# Patient Record
Sex: MALE | Race: WHITE | NOT HISPANIC OR LATINO | URBAN - METROPOLITAN AREA
[De-identification: names, ages, dates, MRNs, and addresses within clinical notes are randomized per-mention and may not be internally consistent; named-entity substitution may affect disease eponyms.]

---

## 2018-06-28 ENCOUNTER — EMERGENCY (EMERGENCY)
Facility: HOSPITAL | Age: 68
LOS: 0 days | Discharge: HOME | End: 2018-06-28
Admitting: PHYSICIAN ASSISTANT

## 2018-06-28 VITALS
HEART RATE: 96 BPM | TEMPERATURE: 97 F | RESPIRATION RATE: 18 BRPM | OXYGEN SATURATION: 98 % | SYSTOLIC BLOOD PRESSURE: 165 MMHG | DIASTOLIC BLOOD PRESSURE: 80 MMHG

## 2018-06-28 DIAGNOSIS — H60.91 UNSPECIFIED OTITIS EXTERNA, RIGHT EAR: ICD-10-CM

## 2018-06-28 DIAGNOSIS — H92.09 OTALGIA, UNSPECIFIED EAR: ICD-10-CM

## 2018-06-28 DIAGNOSIS — I10 ESSENTIAL (PRIMARY) HYPERTENSION: ICD-10-CM

## 2018-06-28 RX ORDER — CIPROFLOXACIN HCL 0.3 %
1 DROPS OPHTHALMIC (EYE) ONCE
Qty: 0 | Refills: 0 | Status: DISCONTINUED | OUTPATIENT
Start: 2018-06-28 | End: 2018-06-28

## 2018-06-28 RX ORDER — CIPROFLOXACIN 6 MG/ML
1 SUSPENSION INTRATYMPANIC
Qty: 1 | Refills: 0
Start: 2018-06-28 | End: 2018-07-04

## 2018-06-28 NOTE — ED PROVIDER NOTE - OBJECTIVE STATEMENT
67 y/o M, no significant PMHx, presents to the ED with complaints of right otalgia x one week. Patient denies associated fever, chills, nausea, vomiting and cephalgia. He states that symptoms began after showering; he placed q-tip to right ear thinking that there was fluid in ear without symptomatic improvement.

## 2018-06-28 NOTE — ED PROVIDER NOTE - ENMT, MLM
+ Right ear canal with swelling ; no drainage ; Left TM with good cone of light visualized. Airway patent, Nasal mucosa clear. Mouth with normal mucosa. Throat has no vesicles, no oropharyngeal exudates and uvula is midline.

## 2018-06-28 NOTE — ED PROVIDER NOTE - ENMT NEGATIVE STATEMENT, MLM
Ears: + right ear pain and no hearing problems.Nose: no nasal congestion and no nasal drainage.Mouth/Throat: no dysphagia, no hoarseness and no throat pain.Neck: no lumps, no pain, no stiffness and no swollen glands.

## 2018-06-28 NOTE — ED ADULT TRIAGE NOTE - CHIEF COMPLAINT QUOTE
Right ear pain x 1 week that has worsened over past few days, pt started feeling discomfort after using Q-tip last week, feeling clogged and fluid in ear sensation, left ear gets clogged too when laying down

## 2018-07-06 PROBLEM — Z00.00 ENCOUNTER FOR PREVENTIVE HEALTH EXAMINATION: Status: ACTIVE | Noted: 2018-07-06

## 2018-07-13 ENCOUNTER — APPOINTMENT (OUTPATIENT)
Dept: OTOLARYNGOLOGY | Facility: CLINIC | Age: 68
End: 2018-07-13
Payer: COMMERCIAL

## 2018-07-13 VITALS — WEIGHT: 162 LBS | HEIGHT: 68 IN | BODY MASS INDEX: 24.55 KG/M2

## 2018-07-13 DIAGNOSIS — Z80.0 FAMILY HISTORY OF MALIGNANT NEOPLASM OF DIGESTIVE ORGANS: ICD-10-CM

## 2018-07-13 DIAGNOSIS — I10 ESSENTIAL (PRIMARY) HYPERTENSION: ICD-10-CM

## 2018-07-13 DIAGNOSIS — Z78.9 OTHER SPECIFIED HEALTH STATUS: ICD-10-CM

## 2018-07-13 DIAGNOSIS — H90.3 SENSORINEURAL HEARING LOSS, BILATERAL: ICD-10-CM

## 2018-07-13 DIAGNOSIS — T78.40XA ALLERGY, UNSPECIFIED, INITIAL ENCOUNTER: ICD-10-CM

## 2018-07-13 DIAGNOSIS — H61.23 IMPACTED CERUMEN, BILATERAL: ICD-10-CM

## 2018-07-13 PROCEDURE — 92557 COMPREHENSIVE HEARING TEST: CPT

## 2018-07-13 PROCEDURE — 99204 OFFICE O/P NEW MOD 45 MIN: CPT | Mod: 25

## 2018-07-13 PROCEDURE — 92567 TYMPANOMETRY: CPT

## 2018-07-13 RX ORDER — LOSARTAN POTASSIUM 100 MG/1
TABLET, FILM COATED ORAL
Refills: 0 | Status: ACTIVE | COMMUNITY

## 2019-02-03 PROBLEM — T78.40XA ALLERGY: Status: ACTIVE | Noted: 2018-07-13

## 2023-01-01 ENCOUNTER — APPOINTMENT (OUTPATIENT)
Dept: ELECTROPHYSIOLOGY | Facility: CLINIC | Age: 73
End: 2023-01-01

## 2023-01-01 ENCOUNTER — INPATIENT (INPATIENT)
Facility: HOSPITAL | Age: 73
LOS: 21 days | Discharge: NURSING, REHAB OR LONG-TERM CARE | DRG: 40 | End: 2023-11-07
Attending: HOSPITALIST | Admitting: INTERNAL MEDICINE
Payer: COMMERCIAL

## 2023-01-01 ENCOUNTER — TRANSCRIPTION ENCOUNTER (OUTPATIENT)
Age: 73
End: 2023-01-01

## 2023-01-01 ENCOUNTER — APPOINTMENT (OUTPATIENT)
Dept: CARDIOLOGY | Facility: CLINIC | Age: 73
End: 2023-01-01
Payer: COMMERCIAL

## 2023-01-01 ENCOUNTER — NON-APPOINTMENT (OUTPATIENT)
Age: 73
End: 2023-01-01

## 2023-01-01 VITALS
RESPIRATION RATE: 16 BRPM | HEART RATE: 82 BPM | OXYGEN SATURATION: 98 % | TEMPERATURE: 99 F | SYSTOLIC BLOOD PRESSURE: 203 MMHG | DIASTOLIC BLOOD PRESSURE: 94 MMHG

## 2023-01-01 VITALS
SYSTOLIC BLOOD PRESSURE: 136 MMHG | DIASTOLIC BLOOD PRESSURE: 74 MMHG | RESPIRATION RATE: 18 BRPM | HEART RATE: 80 BPM | TEMPERATURE: 97 F

## 2023-01-01 DIAGNOSIS — R26.2 DIFFICULTY IN WALKING, NOT ELSEWHERE CLASSIFIED: ICD-10-CM

## 2023-01-01 DIAGNOSIS — R41.0 DISORIENTATION, UNSPECIFIED: ICD-10-CM

## 2023-01-01 DIAGNOSIS — Z78.1 PHYSICAL RESTRAINT STATUS: ICD-10-CM

## 2023-01-01 DIAGNOSIS — R47.81 SLURRED SPEECH: ICD-10-CM

## 2023-01-01 DIAGNOSIS — F01.C11: ICD-10-CM

## 2023-01-01 DIAGNOSIS — R47.1 DYSARTHRIA AND ANARTHRIA: ICD-10-CM

## 2023-01-01 DIAGNOSIS — R63.0 ANOREXIA: ICD-10-CM

## 2023-01-01 DIAGNOSIS — B95.2 ENTEROCOCCUS AS THE CAUSE OF DISEASES CLASSIFIED ELSEWHERE: ICD-10-CM

## 2023-01-01 DIAGNOSIS — Z66 DO NOT RESUSCITATE: ICD-10-CM

## 2023-01-01 DIAGNOSIS — R74.8 ABNORMAL LEVELS OF OTHER SERUM ENZYMES: ICD-10-CM

## 2023-01-01 DIAGNOSIS — F03.90 UNSPECIFIED DEMENTIA WITHOUT BEHAVIORAL DISTURBANCE: ICD-10-CM

## 2023-01-01 DIAGNOSIS — I10 ESSENTIAL (PRIMARY) HYPERTENSION: ICD-10-CM

## 2023-01-01 DIAGNOSIS — Z51.5 ENCOUNTER FOR PALLIATIVE CARE: ICD-10-CM

## 2023-01-01 DIAGNOSIS — R33.9 RETENTION OF URINE, UNSPECIFIED: ICD-10-CM

## 2023-01-01 DIAGNOSIS — N21.0 CALCULUS IN BLADDER: ICD-10-CM

## 2023-01-01 DIAGNOSIS — R13.10 DYSPHAGIA, UNSPECIFIED: ICD-10-CM

## 2023-01-01 DIAGNOSIS — R53.1 WEAKNESS: ICD-10-CM

## 2023-01-01 DIAGNOSIS — I65.01 OCCLUSION AND STENOSIS OF RIGHT VERTEBRAL ARTERY: ICD-10-CM

## 2023-01-01 DIAGNOSIS — N39.0 URINARY TRACT INFECTION, SITE NOT SPECIFIED: ICD-10-CM

## 2023-01-01 DIAGNOSIS — G93.41 METABOLIC ENCEPHALOPATHY: ICD-10-CM

## 2023-01-01 DIAGNOSIS — Z71.89 OTHER SPECIFIED COUNSELING: ICD-10-CM

## 2023-01-01 DIAGNOSIS — E87.6 HYPOKALEMIA: ICD-10-CM

## 2023-01-01 DIAGNOSIS — N17.9 ACUTE KIDNEY FAILURE, UNSPECIFIED: ICD-10-CM

## 2023-01-01 DIAGNOSIS — T83.098A OTHER MECHANICAL COMPLICATION OF OTHER URINARY CATHETER, INITIAL ENCOUNTER: ICD-10-CM

## 2023-01-01 DIAGNOSIS — E43 UNSPECIFIED SEVERE PROTEIN-CALORIE MALNUTRITION: ICD-10-CM

## 2023-01-01 DIAGNOSIS — J69.0 PNEUMONITIS DUE TO INHALATION OF FOOD AND VOMIT: ICD-10-CM
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
## 2023-01-01 DIAGNOSIS — I47.10 SUPRAVENTRICULAR TACHYCARDIA, UNSPECIFIED: ICD-10-CM

## 2023-01-01 DIAGNOSIS — R31.0 GROSS HEMATURIA: ICD-10-CM

## 2023-01-01 DIAGNOSIS — R73.9 HYPERGLYCEMIA, UNSPECIFIED: ICD-10-CM

## 2023-01-01 DIAGNOSIS — Z91.148 PATIENT'S OTHER NONCOMPLIANCE WITH MEDICATION REGIMEN FOR OTHER REASON: ICD-10-CM

## 2023-01-01 DIAGNOSIS — I63.40 CEREBRAL INFARCTION DUE TO EMBOLISM OF UNSPECIFIED CEREBRAL ARTERY: ICD-10-CM

## 2023-01-01 DIAGNOSIS — N40.1 BENIGN PROSTATIC HYPERPLASIA WITH LOWER URINARY TRACT SYMPTOMS: ICD-10-CM

## 2023-01-01 DIAGNOSIS — D64.9 ANEMIA, UNSPECIFIED: ICD-10-CM

## 2023-01-01 DIAGNOSIS — Z20.822 CONTACT WITH AND (SUSPECTED) EXPOSURE TO COVID-19: ICD-10-CM

## 2023-01-01 LAB
-  AMPICILLIN: SIGNIFICANT CHANGE UP
-  AMPICILLIN: SIGNIFICANT CHANGE UP
-  CIPROFLOXACIN: SIGNIFICANT CHANGE UP
-  CIPROFLOXACIN: SIGNIFICANT CHANGE UP
-  LEVOFLOXACIN: SIGNIFICANT CHANGE UP
-  LEVOFLOXACIN: SIGNIFICANT CHANGE UP
-  NITROFURANTOIN: SIGNIFICANT CHANGE UP
-  NITROFURANTOIN: SIGNIFICANT CHANGE UP
-  TETRACYCLINE: SIGNIFICANT CHANGE UP
-  TETRACYCLINE: SIGNIFICANT CHANGE UP
-  VANCOMYCIN: SIGNIFICANT CHANGE UP
-  VANCOMYCIN: SIGNIFICANT CHANGE UP
A1C WITH ESTIMATED AVERAGE GLUCOSE RESULT: 5.8 % — HIGH (ref 4–5.6)
A1C WITH ESTIMATED AVERAGE GLUCOSE RESULT: 5.8 % — HIGH (ref 4–5.6)
ACRM BINDING ANTIBODY: 0.09 NMOL/L — SIGNIFICANT CHANGE UP (ref 0–0.24)
ACRM BINDING ANTIBODY: 0.09 NMOL/L — SIGNIFICANT CHANGE UP (ref 0–0.24)
ALBUMIN SERPL ELPH-MCNC: 2.7 G/DL — LOW (ref 3.5–5.2)
ALBUMIN SERPL ELPH-MCNC: 2.8 G/DL — LOW (ref 3.5–5.2)
ALBUMIN SERPL ELPH-MCNC: 2.8 G/DL — LOW (ref 3.5–5.2)
ALBUMIN SERPL ELPH-MCNC: 2.9 G/DL — LOW (ref 3.5–5.2)
ALBUMIN SERPL ELPH-MCNC: 3 G/DL — LOW (ref 3.5–5.2)
ALBUMIN SERPL ELPH-MCNC: 3 G/DL — LOW (ref 3.5–5.2)
ALBUMIN SERPL ELPH-MCNC: 3.1 G/DL — LOW (ref 3.5–5.2)
ALBUMIN SERPL ELPH-MCNC: 3.2 G/DL — LOW (ref 3.5–5.2)
ALBUMIN SERPL ELPH-MCNC: 3.2 G/DL — LOW (ref 3.5–5.2)
ALBUMIN SERPL ELPH-MCNC: 3.3 G/DL — LOW (ref 3.5–5.2)
ALBUMIN SERPL ELPH-MCNC: 3.3 G/DL — LOW (ref 3.5–5.2)
ALBUMIN SERPL ELPH-MCNC: 3.4 G/DL — LOW (ref 3.5–5.2)
ALBUMIN SERPL ELPH-MCNC: 3.6 G/DL — SIGNIFICANT CHANGE UP (ref 3.5–5.2)
ALBUMIN SERPL ELPH-MCNC: 3.6 G/DL — SIGNIFICANT CHANGE UP (ref 3.5–5.2)
ALBUMIN SERPL ELPH-MCNC: 3.7 G/DL — SIGNIFICANT CHANGE UP (ref 3.5–5.2)
ALBUMIN SERPL ELPH-MCNC: 3.7 G/DL — SIGNIFICANT CHANGE UP (ref 3.5–5.2)
ALBUMIN SERPL ELPH-MCNC: 3.8 G/DL — SIGNIFICANT CHANGE UP (ref 3.5–5.2)
ALBUMIN SERPL ELPH-MCNC: 3.8 G/DL — SIGNIFICANT CHANGE UP (ref 3.5–5.2)
ALBUMIN SERPL ELPH-MCNC: 3.9 G/DL — SIGNIFICANT CHANGE UP (ref 3.5–5.2)
ALBUMIN SERPL ELPH-MCNC: 3.9 G/DL — SIGNIFICANT CHANGE UP (ref 3.5–5.2)
ALBUMIN SERPL ELPH-MCNC: 4.1 G/DL — SIGNIFICANT CHANGE UP (ref 3.5–5.2)
ALDOLASE SERPL-CCNC: 4 U/L — SIGNIFICANT CHANGE UP (ref 3.3–10.3)
ALDOLASE SERPL-CCNC: 4 U/L — SIGNIFICANT CHANGE UP (ref 3.3–10.3)
ALP SERPL-CCNC: 100 U/L — SIGNIFICANT CHANGE UP (ref 30–115)
ALP SERPL-CCNC: 100 U/L — SIGNIFICANT CHANGE UP (ref 30–115)
ALP SERPL-CCNC: 103 U/L — SIGNIFICANT CHANGE UP (ref 30–115)
ALP SERPL-CCNC: 103 U/L — SIGNIFICANT CHANGE UP (ref 30–115)
ALP SERPL-CCNC: 104 U/L — SIGNIFICANT CHANGE UP (ref 30–115)
ALP SERPL-CCNC: 104 U/L — SIGNIFICANT CHANGE UP (ref 30–115)
ALP SERPL-CCNC: 118 U/L — HIGH (ref 30–115)
ALP SERPL-CCNC: 75 U/L — SIGNIFICANT CHANGE UP (ref 30–115)
ALP SERPL-CCNC: 75 U/L — SIGNIFICANT CHANGE UP (ref 30–115)
ALP SERPL-CCNC: 80 U/L — SIGNIFICANT CHANGE UP (ref 30–115)
ALP SERPL-CCNC: 80 U/L — SIGNIFICANT CHANGE UP (ref 30–115)
ALP SERPL-CCNC: 82 U/L — SIGNIFICANT CHANGE UP (ref 30–115)
ALP SERPL-CCNC: 82 U/L — SIGNIFICANT CHANGE UP (ref 30–115)
ALP SERPL-CCNC: 84 U/L — SIGNIFICANT CHANGE UP (ref 30–115)
ALP SERPL-CCNC: 84 U/L — SIGNIFICANT CHANGE UP (ref 30–115)
ALP SERPL-CCNC: 85 U/L — SIGNIFICANT CHANGE UP (ref 30–115)
ALP SERPL-CCNC: 85 U/L — SIGNIFICANT CHANGE UP (ref 30–115)
ALP SERPL-CCNC: 86 U/L — SIGNIFICANT CHANGE UP (ref 30–115)
ALP SERPL-CCNC: 86 U/L — SIGNIFICANT CHANGE UP (ref 30–115)
ALP SERPL-CCNC: 87 U/L — SIGNIFICANT CHANGE UP (ref 30–115)
ALP SERPL-CCNC: 87 U/L — SIGNIFICANT CHANGE UP (ref 30–115)
ALP SERPL-CCNC: 92 U/L — SIGNIFICANT CHANGE UP (ref 30–115)
ALP SERPL-CCNC: 94 U/L — SIGNIFICANT CHANGE UP (ref 30–115)
ALP SERPL-CCNC: 94 U/L — SIGNIFICANT CHANGE UP (ref 30–115)
ALP SERPL-CCNC: 97 U/L — SIGNIFICANT CHANGE UP (ref 30–115)
ALP SERPL-CCNC: 97 U/L — SIGNIFICANT CHANGE UP (ref 30–115)
ALP SERPL-CCNC: 98 U/L — SIGNIFICANT CHANGE UP (ref 30–115)
ALP SERPL-CCNC: 98 U/L — SIGNIFICANT CHANGE UP (ref 30–115)
ALT FLD-CCNC: 10 U/L — SIGNIFICANT CHANGE UP (ref 0–41)
ALT FLD-CCNC: 11 U/L — SIGNIFICANT CHANGE UP (ref 0–41)
ALT FLD-CCNC: 11 U/L — SIGNIFICANT CHANGE UP (ref 0–41)
ALT FLD-CCNC: 12 U/L — SIGNIFICANT CHANGE UP (ref 0–41)
ALT FLD-CCNC: 12 U/L — SIGNIFICANT CHANGE UP (ref 0–41)
ALT FLD-CCNC: 13 U/L — SIGNIFICANT CHANGE UP (ref 0–41)
ALT FLD-CCNC: 13 U/L — SIGNIFICANT CHANGE UP (ref 0–41)
ALT FLD-CCNC: 5 U/L — SIGNIFICANT CHANGE UP (ref 0–41)
ALT FLD-CCNC: 6 U/L — SIGNIFICANT CHANGE UP (ref 0–41)
ALT FLD-CCNC: 7 U/L — SIGNIFICANT CHANGE UP (ref 0–41)
ALT FLD-CCNC: 8 U/L — SIGNIFICANT CHANGE UP (ref 0–41)
ALT FLD-CCNC: 9 U/L — SIGNIFICANT CHANGE UP (ref 0–41)
ANION GAP SERPL CALC-SCNC: 10 MMOL/L — SIGNIFICANT CHANGE UP (ref 7–14)
ANION GAP SERPL CALC-SCNC: 11 MMOL/L — SIGNIFICANT CHANGE UP (ref 7–14)
ANION GAP SERPL CALC-SCNC: 13 MMOL/L — SIGNIFICANT CHANGE UP (ref 7–14)
ANION GAP SERPL CALC-SCNC: 13 MMOL/L — SIGNIFICANT CHANGE UP (ref 7–14)
ANION GAP SERPL CALC-SCNC: 15 MMOL/L — HIGH (ref 7–14)
ANION GAP SERPL CALC-SCNC: 16 MMOL/L — HIGH (ref 7–14)
ANION GAP SERPL CALC-SCNC: 16 MMOL/L — HIGH (ref 7–14)
ANION GAP SERPL CALC-SCNC: 4 MMOL/L — LOW (ref 7–14)
ANION GAP SERPL CALC-SCNC: 4 MMOL/L — LOW (ref 7–14)
ANION GAP SERPL CALC-SCNC: 5 MMOL/L — LOW (ref 7–14)
ANION GAP SERPL CALC-SCNC: 7 MMOL/L — SIGNIFICANT CHANGE UP (ref 7–14)
ANION GAP SERPL CALC-SCNC: 9 MMOL/L — SIGNIFICANT CHANGE UP (ref 7–14)
APPEARANCE UR: ABNORMAL
APPEARANCE UR: CLEAR — SIGNIFICANT CHANGE UP
APTT BLD: 36.7 SEC — SIGNIFICANT CHANGE UP (ref 27–39.2)
APTT BLD: 36.7 SEC — SIGNIFICANT CHANGE UP (ref 27–39.2)
AST SERPL-CCNC: 10 U/L — SIGNIFICANT CHANGE UP (ref 0–41)
AST SERPL-CCNC: 11 U/L — SIGNIFICANT CHANGE UP (ref 0–41)
AST SERPL-CCNC: 11 U/L — SIGNIFICANT CHANGE UP (ref 0–41)
AST SERPL-CCNC: 12 U/L — SIGNIFICANT CHANGE UP (ref 0–41)
AST SERPL-CCNC: 12 U/L — SIGNIFICANT CHANGE UP (ref 0–41)
AST SERPL-CCNC: 13 U/L — SIGNIFICANT CHANGE UP (ref 0–41)
AST SERPL-CCNC: 14 U/L — SIGNIFICANT CHANGE UP (ref 0–41)
AST SERPL-CCNC: 14 U/L — SIGNIFICANT CHANGE UP (ref 0–41)
AST SERPL-CCNC: 15 U/L — SIGNIFICANT CHANGE UP (ref 0–41)
AST SERPL-CCNC: 16 U/L — SIGNIFICANT CHANGE UP (ref 0–41)
AST SERPL-CCNC: 16 U/L — SIGNIFICANT CHANGE UP (ref 0–41)
AST SERPL-CCNC: 21 U/L — SIGNIFICANT CHANGE UP (ref 0–41)
AST SERPL-CCNC: 9 U/L — SIGNIFICANT CHANGE UP (ref 0–41)
AST SERPL-CCNC: 9 U/L — SIGNIFICANT CHANGE UP (ref 0–41)
BACTERIA # UR AUTO: NEGATIVE /HPF — SIGNIFICANT CHANGE UP
BASOPHILS # BLD AUTO: 0.06 K/UL — SIGNIFICANT CHANGE UP (ref 0–0.2)
BASOPHILS # BLD AUTO: 0.07 K/UL — SIGNIFICANT CHANGE UP (ref 0–0.2)
BASOPHILS # BLD AUTO: 0.08 K/UL — SIGNIFICANT CHANGE UP (ref 0–0.2)
BASOPHILS # BLD AUTO: 0.09 K/UL — SIGNIFICANT CHANGE UP (ref 0–0.2)
BASOPHILS NFR BLD AUTO: 0.5 % — SIGNIFICANT CHANGE UP (ref 0–1)
BASOPHILS NFR BLD AUTO: 0.5 % — SIGNIFICANT CHANGE UP (ref 0–1)
BASOPHILS NFR BLD AUTO: 0.6 % — SIGNIFICANT CHANGE UP (ref 0–1)
BASOPHILS NFR BLD AUTO: 0.6 % — SIGNIFICANT CHANGE UP (ref 0–1)
BASOPHILS NFR BLD AUTO: 0.8 % — SIGNIFICANT CHANGE UP (ref 0–1)
BASOPHILS NFR BLD AUTO: 0.8 % — SIGNIFICANT CHANGE UP (ref 0–1)
BASOPHILS NFR BLD AUTO: 0.9 % — SIGNIFICANT CHANGE UP (ref 0–1)
BASOPHILS NFR BLD AUTO: 1.1 % — HIGH (ref 0–1)
BILIRUB SERPL-MCNC: 0.2 MG/DL — SIGNIFICANT CHANGE UP (ref 0.2–1.2)
BILIRUB SERPL-MCNC: 0.2 MG/DL — SIGNIFICANT CHANGE UP (ref 0.2–1.2)
BILIRUB SERPL-MCNC: 0.3 MG/DL — SIGNIFICANT CHANGE UP (ref 0.2–1.2)
BILIRUB SERPL-MCNC: 0.4 MG/DL — SIGNIFICANT CHANGE UP (ref 0.2–1.2)
BILIRUB SERPL-MCNC: 0.5 MG/DL — SIGNIFICANT CHANGE UP (ref 0.2–1.2)
BILIRUB SERPL-MCNC: 0.6 MG/DL — SIGNIFICANT CHANGE UP (ref 0.2–1.2)
BILIRUB SERPL-MCNC: 0.7 MG/DL — SIGNIFICANT CHANGE UP (ref 0.2–1.2)
BILIRUB SERPL-MCNC: 0.8 MG/DL — SIGNIFICANT CHANGE UP (ref 0.2–1.2)
BILIRUB SERPL-MCNC: 0.8 MG/DL — SIGNIFICANT CHANGE UP (ref 0.2–1.2)
BILIRUB SERPL-MCNC: 1.2 MG/DL — SIGNIFICANT CHANGE UP (ref 0.2–1.2)
BILIRUB SERPL-MCNC: 1.2 MG/DL — SIGNIFICANT CHANGE UP (ref 0.2–1.2)
BILIRUB UR-MCNC: ABNORMAL
BILIRUB UR-MCNC: NEGATIVE — SIGNIFICANT CHANGE UP
BLD GP AB SCN SERPL QL: SIGNIFICANT CHANGE UP
BUN SERPL-MCNC: 10 MG/DL — SIGNIFICANT CHANGE UP (ref 10–20)
BUN SERPL-MCNC: 11 MG/DL — SIGNIFICANT CHANGE UP (ref 10–20)
BUN SERPL-MCNC: 12 MG/DL — SIGNIFICANT CHANGE UP (ref 10–20)
BUN SERPL-MCNC: 12 MG/DL — SIGNIFICANT CHANGE UP (ref 10–20)
BUN SERPL-MCNC: 13 MG/DL — SIGNIFICANT CHANGE UP (ref 10–20)
BUN SERPL-MCNC: 13 MG/DL — SIGNIFICANT CHANGE UP (ref 10–20)
BUN SERPL-MCNC: 14 MG/DL — SIGNIFICANT CHANGE UP (ref 10–20)
BUN SERPL-MCNC: 14 MG/DL — SIGNIFICANT CHANGE UP (ref 10–20)
BUN SERPL-MCNC: 15 MG/DL — SIGNIFICANT CHANGE UP (ref 10–20)
BUN SERPL-MCNC: 15 MG/DL — SIGNIFICANT CHANGE UP (ref 10–20)
BUN SERPL-MCNC: 16 MG/DL — SIGNIFICANT CHANGE UP (ref 10–20)
BUN SERPL-MCNC: 16 MG/DL — SIGNIFICANT CHANGE UP (ref 10–20)
BUN SERPL-MCNC: 20 MG/DL — SIGNIFICANT CHANGE UP (ref 10–20)
BUN SERPL-MCNC: 20 MG/DL — SIGNIFICANT CHANGE UP (ref 10–20)
BUN SERPL-MCNC: 23 MG/DL — HIGH (ref 10–20)
BUN SERPL-MCNC: 23 MG/DL — HIGH (ref 10–20)
BUN SERPL-MCNC: 24 MG/DL — HIGH (ref 10–20)
BUN SERPL-MCNC: 24 MG/DL — HIGH (ref 10–20)
BUN SERPL-MCNC: 25 MG/DL — HIGH (ref 10–20)
BUN SERPL-MCNC: 25 MG/DL — HIGH (ref 10–20)
BUN SERPL-MCNC: 26 MG/DL — HIGH (ref 10–20)
BUN SERPL-MCNC: 26 MG/DL — HIGH (ref 10–20)
BUN SERPL-MCNC: 6 MG/DL — LOW (ref 10–20)
BUN SERPL-MCNC: 7 MG/DL — LOW (ref 10–20)
BUN SERPL-MCNC: 9 MG/DL — LOW (ref 10–20)
BUN SERPL-MCNC: 9 MG/DL — LOW (ref 10–20)
CALCIUM SERPL-MCNC: 8.2 MG/DL — LOW (ref 8.4–10.5)
CALCIUM SERPL-MCNC: 8.3 MG/DL — LOW (ref 8.4–10.5)
CALCIUM SERPL-MCNC: 8.3 MG/DL — LOW (ref 8.4–10.5)
CALCIUM SERPL-MCNC: 8.4 MG/DL — SIGNIFICANT CHANGE UP (ref 8.4–10.5)
CALCIUM SERPL-MCNC: 8.4 MG/DL — SIGNIFICANT CHANGE UP (ref 8.4–10.5)
CALCIUM SERPL-MCNC: 8.5 MG/DL — SIGNIFICANT CHANGE UP (ref 8.4–10.5)
CALCIUM SERPL-MCNC: 8.5 MG/DL — SIGNIFICANT CHANGE UP (ref 8.4–10.5)
CALCIUM SERPL-MCNC: 8.6 MG/DL — SIGNIFICANT CHANGE UP (ref 8.4–10.5)
CALCIUM SERPL-MCNC: 8.7 MG/DL — SIGNIFICANT CHANGE UP (ref 8.4–10.4)
CALCIUM SERPL-MCNC: 8.7 MG/DL — SIGNIFICANT CHANGE UP (ref 8.4–10.5)
CALCIUM SERPL-MCNC: 8.8 MG/DL — SIGNIFICANT CHANGE UP (ref 8.4–10.4)
CALCIUM SERPL-MCNC: 8.8 MG/DL — SIGNIFICANT CHANGE UP (ref 8.4–10.4)
CALCIUM SERPL-MCNC: 8.8 MG/DL — SIGNIFICANT CHANGE UP (ref 8.4–10.5)
CALCIUM SERPL-MCNC: 8.8 MG/DL — SIGNIFICANT CHANGE UP (ref 8.4–10.5)
CALCIUM SERPL-MCNC: 9 MG/DL — SIGNIFICANT CHANGE UP (ref 8.4–10.5)
CALCIUM SERPL-MCNC: 9 MG/DL — SIGNIFICANT CHANGE UP (ref 8.4–10.5)
CALCIUM SERPL-MCNC: 9.1 MG/DL — SIGNIFICANT CHANGE UP (ref 8.4–10.4)
CALCIUM SERPL-MCNC: 9.1 MG/DL — SIGNIFICANT CHANGE UP (ref 8.4–10.4)
CALCIUM SERPL-MCNC: 9.1 MG/DL — SIGNIFICANT CHANGE UP (ref 8.4–10.5)
CALCIUM SERPL-MCNC: 9.1 MG/DL — SIGNIFICANT CHANGE UP (ref 8.4–10.5)
CALCIUM SERPL-MCNC: 9.3 MG/DL — SIGNIFICANT CHANGE UP (ref 8.4–10.5)
CALCIUM SERPL-MCNC: 9.3 MG/DL — SIGNIFICANT CHANGE UP (ref 8.4–10.5)
CALCIUM SERPL-MCNC: 9.7 MG/DL — SIGNIFICANT CHANGE UP (ref 8.4–10.5)
CALCIUM SERPL-MCNC: 9.7 MG/DL — SIGNIFICANT CHANGE UP (ref 8.4–10.5)
CAST: 0 /LPF — SIGNIFICANT CHANGE UP (ref 0–4)
CHLORIDE SERPL-SCNC: 100 MMOL/L — SIGNIFICANT CHANGE UP (ref 98–110)
CHLORIDE SERPL-SCNC: 100 MMOL/L — SIGNIFICANT CHANGE UP (ref 98–110)
CHLORIDE SERPL-SCNC: 101 MMOL/L — SIGNIFICANT CHANGE UP (ref 98–110)
CHLORIDE SERPL-SCNC: 102 MMOL/L — SIGNIFICANT CHANGE UP (ref 98–110)
CHLORIDE SERPL-SCNC: 103 MMOL/L — SIGNIFICANT CHANGE UP (ref 98–110)
CHLORIDE SERPL-SCNC: 104 MMOL/L — SIGNIFICANT CHANGE UP (ref 98–110)
CHLORIDE SERPL-SCNC: 105 MMOL/L — SIGNIFICANT CHANGE UP (ref 98–110)
CHLORIDE SERPL-SCNC: 105 MMOL/L — SIGNIFICANT CHANGE UP (ref 98–110)
CHLORIDE SERPL-SCNC: 106 MMOL/L — SIGNIFICANT CHANGE UP (ref 98–110)
CHLORIDE SERPL-SCNC: 108 MMOL/L — SIGNIFICANT CHANGE UP (ref 98–110)
CHLORIDE SERPL-SCNC: 108 MMOL/L — SIGNIFICANT CHANGE UP (ref 98–110)
CHLORIDE SERPL-SCNC: 109 MMOL/L — SIGNIFICANT CHANGE UP (ref 98–110)
CHLORIDE SERPL-SCNC: 98 MMOL/L — SIGNIFICANT CHANGE UP (ref 98–110)
CHOLEST SERPL-MCNC: 167 MG/DL — SIGNIFICANT CHANGE UP
CHOLEST SERPL-MCNC: 167 MG/DL — SIGNIFICANT CHANGE UP
CK MB CFR SERPL CALC: 1.5 NG/ML — SIGNIFICANT CHANGE UP (ref 0.6–6.3)
CK MB CFR SERPL CALC: 1.5 NG/ML — SIGNIFICANT CHANGE UP (ref 0.6–6.3)
CK SERPL-CCNC: 49 U/L — SIGNIFICANT CHANGE UP (ref 0–225)
CK SERPL-CCNC: 49 U/L — SIGNIFICANT CHANGE UP (ref 0–225)
CK SERPL-CCNC: 98 U/L — SIGNIFICANT CHANGE UP (ref 0–225)
CK SERPL-CCNC: 98 U/L — SIGNIFICANT CHANGE UP (ref 0–225)
CO2 SERPL-SCNC: 20 MMOL/L — SIGNIFICANT CHANGE UP (ref 17–32)
CO2 SERPL-SCNC: 20 MMOL/L — SIGNIFICANT CHANGE UP (ref 17–32)
CO2 SERPL-SCNC: 21 MMOL/L — SIGNIFICANT CHANGE UP (ref 17–32)
CO2 SERPL-SCNC: 21 MMOL/L — SIGNIFICANT CHANGE UP (ref 17–32)
CO2 SERPL-SCNC: 23 MMOL/L — SIGNIFICANT CHANGE UP (ref 17–32)
CO2 SERPL-SCNC: 23 MMOL/L — SIGNIFICANT CHANGE UP (ref 17–32)
CO2 SERPL-SCNC: 24 MMOL/L — SIGNIFICANT CHANGE UP (ref 17–32)
CO2 SERPL-SCNC: 24 MMOL/L — SIGNIFICANT CHANGE UP (ref 17–32)
CO2 SERPL-SCNC: 25 MMOL/L — SIGNIFICANT CHANGE UP (ref 17–32)
CO2 SERPL-SCNC: 26 MMOL/L — SIGNIFICANT CHANGE UP (ref 17–32)
CO2 SERPL-SCNC: 27 MMOL/L — SIGNIFICANT CHANGE UP (ref 17–32)
CO2 SERPL-SCNC: 28 MMOL/L — SIGNIFICANT CHANGE UP (ref 17–32)
CO2 SERPL-SCNC: 28 MMOL/L — SIGNIFICANT CHANGE UP (ref 17–32)
CO2 SERPL-SCNC: 29 MMOL/L — SIGNIFICANT CHANGE UP (ref 17–32)
CO2 SERPL-SCNC: 30 MMOL/L — SIGNIFICANT CHANGE UP (ref 17–32)
CO2 SERPL-SCNC: 32 MMOL/L — SIGNIFICANT CHANGE UP (ref 17–32)
CO2 SERPL-SCNC: 32 MMOL/L — SIGNIFICANT CHANGE UP (ref 17–32)
COLOR SPEC: ABNORMAL
COLOR SPEC: YELLOW — SIGNIFICANT CHANGE UP
CREAT SERPL-MCNC: 0.6 MG/DL — LOW (ref 0.7–1.5)
CREAT SERPL-MCNC: 0.6 MG/DL — LOW (ref 0.7–1.5)
CREAT SERPL-MCNC: 0.7 MG/DL — SIGNIFICANT CHANGE UP (ref 0.7–1.5)
CREAT SERPL-MCNC: 0.8 MG/DL — SIGNIFICANT CHANGE UP (ref 0.7–1.5)
CREAT SERPL-MCNC: 0.9 MG/DL — SIGNIFICANT CHANGE UP (ref 0.7–1.5)
CREAT SERPL-MCNC: 1 MG/DL — SIGNIFICANT CHANGE UP (ref 0.7–1.5)
CREAT SERPL-MCNC: 1 MG/DL — SIGNIFICANT CHANGE UP (ref 0.7–1.5)
CREAT SERPL-MCNC: 1.3 MG/DL — SIGNIFICANT CHANGE UP (ref 0.7–1.5)
CREAT SERPL-MCNC: 1.3 MG/DL — SIGNIFICANT CHANGE UP (ref 0.7–1.5)
CREAT SERPL-MCNC: <0.5 MG/DL — LOW (ref 0.7–1.5)
CREAT SERPL-MCNC: <0.5 MG/DL — LOW (ref 0.7–1.5)
CULTURE RESULTS: NO GROWTH — SIGNIFICANT CHANGE UP
CULTURE RESULTS: NO GROWTH — SIGNIFICANT CHANGE UP
CULTURE RESULTS: SIGNIFICANT CHANGE UP
DIFF PNL FLD: ABNORMAL
DIFF PNL FLD: NEGATIVE — SIGNIFICANT CHANGE UP
EGFR: 102 ML/MIN/1.73M2 — SIGNIFICANT CHANGE UP
EGFR: 102 ML/MIN/1.73M2 — SIGNIFICANT CHANGE UP
EGFR: 115 ML/MIN/1.73M2 — SIGNIFICANT CHANGE UP
EGFR: 115 ML/MIN/1.73M2 — SIGNIFICANT CHANGE UP
EGFR: 58 ML/MIN/1.73M2 — LOW
EGFR: 58 ML/MIN/1.73M2 — LOW
EGFR: 79 ML/MIN/1.73M2 — SIGNIFICANT CHANGE UP
EGFR: 79 ML/MIN/1.73M2 — SIGNIFICANT CHANGE UP
EGFR: 90 ML/MIN/1.73M2 — SIGNIFICANT CHANGE UP
EGFR: 93 ML/MIN/1.73M2 — SIGNIFICANT CHANGE UP
EGFR: 97 ML/MIN/1.73M2 — SIGNIFICANT CHANGE UP
EOSINOPHIL # BLD AUTO: 0.01 K/UL — SIGNIFICANT CHANGE UP (ref 0–0.7)
EOSINOPHIL # BLD AUTO: 0.03 K/UL — SIGNIFICANT CHANGE UP (ref 0–0.7)
EOSINOPHIL # BLD AUTO: 0.03 K/UL — SIGNIFICANT CHANGE UP (ref 0–0.7)
EOSINOPHIL # BLD AUTO: 0.04 K/UL — SIGNIFICANT CHANGE UP (ref 0–0.7)
EOSINOPHIL # BLD AUTO: 0.04 K/UL — SIGNIFICANT CHANGE UP (ref 0–0.7)
EOSINOPHIL # BLD AUTO: 0.05 K/UL — SIGNIFICANT CHANGE UP (ref 0–0.7)
EOSINOPHIL # BLD AUTO: 0.12 K/UL — SIGNIFICANT CHANGE UP (ref 0–0.7)
EOSINOPHIL # BLD AUTO: 0.12 K/UL — SIGNIFICANT CHANGE UP (ref 0–0.7)
EOSINOPHIL NFR BLD AUTO: 0.1 % — SIGNIFICANT CHANGE UP (ref 0–8)
EOSINOPHIL NFR BLD AUTO: 0.4 % — SIGNIFICANT CHANGE UP (ref 0–8)
EOSINOPHIL NFR BLD AUTO: 0.4 % — SIGNIFICANT CHANGE UP (ref 0–8)
EOSINOPHIL NFR BLD AUTO: 0.5 % — SIGNIFICANT CHANGE UP (ref 0–8)
EOSINOPHIL NFR BLD AUTO: 0.5 % — SIGNIFICANT CHANGE UP (ref 0–8)
EOSINOPHIL NFR BLD AUTO: 0.7 % — SIGNIFICANT CHANGE UP (ref 0–8)
EOSINOPHIL NFR BLD AUTO: 0.8 % — SIGNIFICANT CHANGE UP (ref 0–8)
EOSINOPHIL NFR BLD AUTO: 1.4 % — SIGNIFICANT CHANGE UP (ref 0–8)
EOSINOPHIL NFR BLD AUTO: 1.4 % — SIGNIFICANT CHANGE UP (ref 0–8)
ESTIMATED AVERAGE GLUCOSE: 120 MG/DL — HIGH (ref 68–114)
ESTIMATED AVERAGE GLUCOSE: 120 MG/DL — HIGH (ref 68–114)
GLUCOSE BLDC GLUCOMTR-MCNC: 132 MG/DL — HIGH (ref 70–99)
GLUCOSE BLDC GLUCOMTR-MCNC: 132 MG/DL — HIGH (ref 70–99)
GLUCOSE SERPL-MCNC: 101 MG/DL — HIGH (ref 70–99)
GLUCOSE SERPL-MCNC: 102 MG/DL — HIGH (ref 70–99)
GLUCOSE SERPL-MCNC: 103 MG/DL — HIGH (ref 70–99)
GLUCOSE SERPL-MCNC: 103 MG/DL — HIGH (ref 70–99)
GLUCOSE SERPL-MCNC: 104 MG/DL — HIGH (ref 70–99)
GLUCOSE SERPL-MCNC: 104 MG/DL — HIGH (ref 70–99)
GLUCOSE SERPL-MCNC: 109 MG/DL — HIGH (ref 70–99)
GLUCOSE SERPL-MCNC: 109 MG/DL — HIGH (ref 70–99)
GLUCOSE SERPL-MCNC: 111 MG/DL — HIGH (ref 70–99)
GLUCOSE SERPL-MCNC: 116 MG/DL — HIGH (ref 70–99)
GLUCOSE SERPL-MCNC: 116 MG/DL — HIGH (ref 70–99)
GLUCOSE SERPL-MCNC: 119 MG/DL — HIGH (ref 70–99)
GLUCOSE SERPL-MCNC: 119 MG/DL — HIGH (ref 70–99)
GLUCOSE SERPL-MCNC: 122 MG/DL — HIGH (ref 70–99)
GLUCOSE SERPL-MCNC: 122 MG/DL — HIGH (ref 70–99)
GLUCOSE SERPL-MCNC: 123 MG/DL — HIGH (ref 70–99)
GLUCOSE SERPL-MCNC: 123 MG/DL — HIGH (ref 70–99)
GLUCOSE SERPL-MCNC: 129 MG/DL — HIGH (ref 70–99)
GLUCOSE SERPL-MCNC: 129 MG/DL — HIGH (ref 70–99)
GLUCOSE SERPL-MCNC: 143 MG/DL — HIGH (ref 70–99)
GLUCOSE SERPL-MCNC: 143 MG/DL — HIGH (ref 70–99)
GLUCOSE SERPL-MCNC: 68 MG/DL — LOW (ref 70–99)
GLUCOSE SERPL-MCNC: 68 MG/DL — LOW (ref 70–99)
GLUCOSE SERPL-MCNC: 81 MG/DL — SIGNIFICANT CHANGE UP (ref 70–99)
GLUCOSE SERPL-MCNC: 81 MG/DL — SIGNIFICANT CHANGE UP (ref 70–99)
GLUCOSE SERPL-MCNC: 96 MG/DL — SIGNIFICANT CHANGE UP (ref 70–99)
GLUCOSE SERPL-MCNC: 96 MG/DL — SIGNIFICANT CHANGE UP (ref 70–99)
GLUCOSE SERPL-MCNC: 97 MG/DL — SIGNIFICANT CHANGE UP (ref 70–99)
GLUCOSE SERPL-MCNC: 97 MG/DL — SIGNIFICANT CHANGE UP (ref 70–99)
GLUCOSE SERPL-MCNC: 98 MG/DL — SIGNIFICANT CHANGE UP (ref 70–99)
GLUCOSE SERPL-MCNC: 98 MG/DL — SIGNIFICANT CHANGE UP (ref 70–99)
GLUCOSE UR QL: NEGATIVE MG/DL — SIGNIFICANT CHANGE UP
HCT VFR BLD CALC: 28 % — LOW (ref 42–52)
HCT VFR BLD CALC: 28 % — LOW (ref 42–52)
HCT VFR BLD CALC: 29.3 % — LOW (ref 42–52)
HCT VFR BLD CALC: 29.7 % — LOW (ref 42–52)
HCT VFR BLD CALC: 29.7 % — LOW (ref 42–52)
HCT VFR BLD CALC: 29.8 % — LOW (ref 42–52)
HCT VFR BLD CALC: 29.8 % — LOW (ref 42–52)
HCT VFR BLD CALC: 30.3 % — LOW (ref 42–52)
HCT VFR BLD CALC: 30.3 % — LOW (ref 42–52)
HCT VFR BLD CALC: 30.5 % — LOW (ref 42–52)
HCT VFR BLD CALC: 30.5 % — LOW (ref 42–52)
HCT VFR BLD CALC: 30.8 % — LOW (ref 42–52)
HCT VFR BLD CALC: 30.8 % — LOW (ref 42–52)
HCT VFR BLD CALC: 32 % — LOW (ref 42–52)
HCT VFR BLD CALC: 32 % — LOW (ref 42–52)
HCT VFR BLD CALC: 32.5 % — LOW (ref 42–52)
HCT VFR BLD CALC: 32.9 % — LOW (ref 42–52)
HCT VFR BLD CALC: 32.9 % — LOW (ref 42–52)
HCT VFR BLD CALC: 33.7 % — LOW (ref 42–52)
HCT VFR BLD CALC: 33.7 % — LOW (ref 42–52)
HCT VFR BLD CALC: 34.1 % — LOW (ref 42–52)
HCT VFR BLD CALC: 34.1 % — LOW (ref 42–52)
HCT VFR BLD CALC: 34.4 % — LOW (ref 42–52)
HCT VFR BLD CALC: 34.4 % — LOW (ref 42–52)
HCT VFR BLD CALC: 35.3 % — LOW (ref 42–52)
HCT VFR BLD CALC: 35.3 % — LOW (ref 42–52)
HCT VFR BLD CALC: 35.7 % — LOW (ref 42–52)
HCT VFR BLD CALC: 35.7 % — LOW (ref 42–52)
HCT VFR BLD CALC: 36.4 % — LOW (ref 42–52)
HCT VFR BLD CALC: 36.6 % — LOW (ref 42–52)
HCT VFR BLD CALC: 36.6 % — LOW (ref 42–52)
HCT VFR BLD CALC: 37.3 % — LOW (ref 42–52)
HCT VFR BLD CALC: 37.3 % — LOW (ref 42–52)
HCT VFR BLD CALC: 37.4 % — LOW (ref 42–52)
HCT VFR BLD CALC: 37.4 % — LOW (ref 42–52)
HCT VFR BLD CALC: 40.6 % — LOW (ref 42–52)
HCT VFR BLD CALC: 40.6 % — LOW (ref 42–52)
HCV AB S/CO SERPL IA: 0.04 COI — SIGNIFICANT CHANGE UP
HCV AB S/CO SERPL IA: 0.04 COI — SIGNIFICANT CHANGE UP
HCV AB SERPL-IMP: SIGNIFICANT CHANGE UP
HCV AB SERPL-IMP: SIGNIFICANT CHANGE UP
HDLC SERPL-MCNC: 51 MG/DL — SIGNIFICANT CHANGE UP
HDLC SERPL-MCNC: 51 MG/DL — SIGNIFICANT CHANGE UP
HGB BLD-MCNC: 10 G/DL — LOW (ref 14–18)
HGB BLD-MCNC: 10 G/DL — LOW (ref 14–18)
HGB BLD-MCNC: 10.4 G/DL — LOW (ref 14–18)
HGB BLD-MCNC: 10.6 G/DL — LOW (ref 14–18)
HGB BLD-MCNC: 10.6 G/DL — LOW (ref 14–18)
HGB BLD-MCNC: 10.8 G/DL — LOW (ref 14–18)
HGB BLD-MCNC: 11 G/DL — LOW (ref 14–18)
HGB BLD-MCNC: 11 G/DL — LOW (ref 14–18)
HGB BLD-MCNC: 11.4 G/DL — LOW (ref 14–18)
HGB BLD-MCNC: 11.4 G/DL — LOW (ref 14–18)
HGB BLD-MCNC: 11.6 G/DL — LOW (ref 14–18)
HGB BLD-MCNC: 11.6 G/DL — LOW (ref 14–18)
HGB BLD-MCNC: 11.7 G/DL — LOW (ref 14–18)
HGB BLD-MCNC: 11.7 G/DL — LOW (ref 14–18)
HGB BLD-MCNC: 11.8 G/DL — LOW (ref 14–18)
HGB BLD-MCNC: 11.8 G/DL — LOW (ref 14–18)
HGB BLD-MCNC: 12.1 G/DL — LOW (ref 14–18)
HGB BLD-MCNC: 12.3 G/DL — LOW (ref 14–18)
HGB BLD-MCNC: 12.3 G/DL — LOW (ref 14–18)
HGB BLD-MCNC: 13.4 G/DL — LOW (ref 14–18)
HGB BLD-MCNC: 13.4 G/DL — LOW (ref 14–18)
HGB BLD-MCNC: 9 G/DL — LOW (ref 14–18)
HGB BLD-MCNC: 9 G/DL — LOW (ref 14–18)
HGB BLD-MCNC: 9.4 G/DL — LOW (ref 14–18)
HGB BLD-MCNC: 9.4 G/DL — LOW (ref 14–18)
HGB BLD-MCNC: 9.6 G/DL — LOW (ref 14–18)
HGB BLD-MCNC: 9.8 G/DL — LOW (ref 14–18)
IMM GRANULOCYTES NFR BLD AUTO: 0.3 % — SIGNIFICANT CHANGE UP (ref 0.1–0.3)
IMM GRANULOCYTES NFR BLD AUTO: 0.4 % — HIGH (ref 0.1–0.3)
IMM GRANULOCYTES NFR BLD AUTO: 0.5 % — HIGH (ref 0.1–0.3)
IMM GRANULOCYTES NFR BLD AUTO: 0.6 % — HIGH (ref 0.1–0.3)
IMM GRANULOCYTES NFR BLD AUTO: 0.6 % — HIGH (ref 0.1–0.3)
INR BLD: 1.32 RATIO — HIGH (ref 0.65–1.3)
INR BLD: 1.32 RATIO — HIGH (ref 0.65–1.3)
KETONES UR-MCNC: ABNORMAL MG/DL
KETONES UR-MCNC: ABNORMAL MG/DL
KETONES UR-MCNC: NEGATIVE MG/DL — SIGNIFICANT CHANGE UP
LACTATE SERPL-SCNC: 1.1 MMOL/L — SIGNIFICANT CHANGE UP (ref 0.7–2)
LACTATE SERPL-SCNC: 1.1 MMOL/L — SIGNIFICANT CHANGE UP (ref 0.7–2)
LDH SERPL L TO P-CCNC: 152 U/L — SIGNIFICANT CHANGE UP (ref 50–242)
LDH SERPL L TO P-CCNC: 152 U/L — SIGNIFICANT CHANGE UP (ref 50–242)
LEUKOCYTE ESTERASE UR-ACNC: ABNORMAL
LIPID PNL WITH DIRECT LDL SERPL: 105 MG/DL — HIGH
LIPID PNL WITH DIRECT LDL SERPL: 105 MG/DL — HIGH
LRP4 AUTOANTIBODY TEST: SIGNIFICANT CHANGE UP
LRP4 AUTOANTIBODY TEST: SIGNIFICANT CHANGE UP
LYMPHOCYTES # BLD AUTO: 0.46 K/UL — LOW (ref 1.2–3.4)
LYMPHOCYTES # BLD AUTO: 0.46 K/UL — LOW (ref 1.2–3.4)
LYMPHOCYTES # BLD AUTO: 0.49 K/UL — LOW (ref 1.2–3.4)
LYMPHOCYTES # BLD AUTO: 0.49 K/UL — LOW (ref 1.2–3.4)
LYMPHOCYTES # BLD AUTO: 0.66 K/UL — LOW (ref 1.2–3.4)
LYMPHOCYTES # BLD AUTO: 0.66 K/UL — LOW (ref 1.2–3.4)
LYMPHOCYTES # BLD AUTO: 0.71 K/UL — LOW (ref 1.2–3.4)
LYMPHOCYTES # BLD AUTO: 0.71 K/UL — LOW (ref 1.2–3.4)
LYMPHOCYTES # BLD AUTO: 0.74 K/UL — LOW (ref 1.2–3.4)
LYMPHOCYTES # BLD AUTO: 0.87 K/UL — LOW (ref 1.2–3.4)
LYMPHOCYTES # BLD AUTO: 0.92 K/UL — LOW (ref 1.2–3.4)
LYMPHOCYTES # BLD AUTO: 0.92 K/UL — LOW (ref 1.2–3.4)
LYMPHOCYTES # BLD AUTO: 10 % — LOW (ref 20.5–51.1)
LYMPHOCYTES # BLD AUTO: 10.2 % — LOW (ref 20.5–51.1)
LYMPHOCYTES # BLD AUTO: 10.2 % — LOW (ref 20.5–51.1)
LYMPHOCYTES # BLD AUTO: 11.1 % — LOW (ref 20.5–51.1)
LYMPHOCYTES # BLD AUTO: 14 % — LOW (ref 20.5–51.1)
LYMPHOCYTES # BLD AUTO: 14 % — LOW (ref 20.5–51.1)
LYMPHOCYTES # BLD AUTO: 3.3 % — LOW (ref 20.5–51.1)
LYMPHOCYTES # BLD AUTO: 3.3 % — LOW (ref 20.5–51.1)
LYMPHOCYTES # BLD AUTO: 4.1 % — LOW (ref 20.5–51.1)
LYMPHOCYTES # BLD AUTO: 4.1 % — LOW (ref 20.5–51.1)
LYMPHOCYTES # BLD AUTO: 8.5 % — LOW (ref 20.5–51.1)
LYMPHOCYTES # BLD AUTO: 8.5 % — LOW (ref 20.5–51.1)
MAGNESIUM SERPL-MCNC: 1.6 MG/DL — LOW (ref 1.8–2.4)
MAGNESIUM SERPL-MCNC: 1.6 MG/DL — LOW (ref 1.8–2.4)
MAGNESIUM SERPL-MCNC: 1.7 MG/DL — LOW (ref 1.8–2.4)
MAGNESIUM SERPL-MCNC: 1.7 MG/DL — LOW (ref 1.8–2.4)
MAGNESIUM SERPL-MCNC: 2.2 MG/DL — SIGNIFICANT CHANGE UP (ref 1.8–2.4)
MAGNESIUM SERPL-MCNC: 2.2 MG/DL — SIGNIFICANT CHANGE UP (ref 1.8–2.4)
MAGNESIUM SERPL-MCNC: 2.3 MG/DL — SIGNIFICANT CHANGE UP (ref 1.8–2.4)
MAGNESIUM SERPL-MCNC: 2.3 MG/DL — SIGNIFICANT CHANGE UP (ref 1.8–2.4)
MCHC RBC-ENTMCNC: 28.6 PG — SIGNIFICANT CHANGE UP (ref 27–31)
MCHC RBC-ENTMCNC: 28.7 PG — SIGNIFICANT CHANGE UP (ref 27–31)
MCHC RBC-ENTMCNC: 28.9 PG — SIGNIFICANT CHANGE UP (ref 27–31)
MCHC RBC-ENTMCNC: 29 PG — SIGNIFICANT CHANGE UP (ref 27–31)
MCHC RBC-ENTMCNC: 29.1 PG — SIGNIFICANT CHANGE UP (ref 27–31)
MCHC RBC-ENTMCNC: 29.2 PG — SIGNIFICANT CHANGE UP (ref 27–31)
MCHC RBC-ENTMCNC: 29.3 PG — SIGNIFICANT CHANGE UP (ref 27–31)
MCHC RBC-ENTMCNC: 29.7 PG — SIGNIFICANT CHANGE UP (ref 27–31)
MCHC RBC-ENTMCNC: 31.9 G/DL — LOW (ref 32–37)
MCHC RBC-ENTMCNC: 31.9 G/DL — LOW (ref 32–37)
MCHC RBC-ENTMCNC: 32 G/DL — SIGNIFICANT CHANGE UP (ref 32–37)
MCHC RBC-ENTMCNC: 32.1 G/DL — SIGNIFICANT CHANGE UP (ref 32–37)
MCHC RBC-ENTMCNC: 32.2 G/DL — SIGNIFICANT CHANGE UP (ref 32–37)
MCHC RBC-ENTMCNC: 32.3 G/DL — SIGNIFICANT CHANGE UP (ref 32–37)
MCHC RBC-ENTMCNC: 32.4 G/DL — SIGNIFICANT CHANGE UP (ref 32–37)
MCHC RBC-ENTMCNC: 32.4 G/DL — SIGNIFICANT CHANGE UP (ref 32–37)
MCHC RBC-ENTMCNC: 32.5 G/DL — SIGNIFICANT CHANGE UP (ref 32–37)
MCHC RBC-ENTMCNC: 32.8 G/DL — SIGNIFICANT CHANGE UP (ref 32–37)
MCHC RBC-ENTMCNC: 32.8 G/DL — SIGNIFICANT CHANGE UP (ref 32–37)
MCHC RBC-ENTMCNC: 32.9 G/DL — SIGNIFICANT CHANGE UP (ref 32–37)
MCHC RBC-ENTMCNC: 32.9 G/DL — SIGNIFICANT CHANGE UP (ref 32–37)
MCHC RBC-ENTMCNC: 33 G/DL — SIGNIFICANT CHANGE UP (ref 32–37)
MCHC RBC-ENTMCNC: 33 G/DL — SIGNIFICANT CHANGE UP (ref 32–37)
MCHC RBC-ENTMCNC: 33.1 G/DL — SIGNIFICANT CHANGE UP (ref 32–37)
MCHC RBC-ENTMCNC: 33.1 G/DL — SIGNIFICANT CHANGE UP (ref 32–37)
MCHC RBC-ENTMCNC: 33.2 G/DL — SIGNIFICANT CHANGE UP (ref 32–37)
MCHC RBC-ENTMCNC: 33.7 G/DL — SIGNIFICANT CHANGE UP (ref 32–37)
MCHC RBC-ENTMCNC: 33.7 G/DL — SIGNIFICANT CHANGE UP (ref 32–37)
MCV RBC AUTO: 86.6 FL — SIGNIFICANT CHANGE UP (ref 80–94)
MCV RBC AUTO: 86.6 FL — SIGNIFICANT CHANGE UP (ref 80–94)
MCV RBC AUTO: 87 FL — SIGNIFICANT CHANGE UP (ref 80–94)
MCV RBC AUTO: 87 FL — SIGNIFICANT CHANGE UP (ref 80–94)
MCV RBC AUTO: 87.1 FL — SIGNIFICANT CHANGE UP (ref 80–94)
MCV RBC AUTO: 87.7 FL — SIGNIFICANT CHANGE UP (ref 80–94)
MCV RBC AUTO: 87.7 FL — SIGNIFICANT CHANGE UP (ref 80–94)
MCV RBC AUTO: 88.3 FL — SIGNIFICANT CHANGE UP (ref 80–94)
MCV RBC AUTO: 88.3 FL — SIGNIFICANT CHANGE UP (ref 80–94)
MCV RBC AUTO: 88.8 FL — SIGNIFICANT CHANGE UP (ref 80–94)
MCV RBC AUTO: 88.8 FL — SIGNIFICANT CHANGE UP (ref 80–94)
MCV RBC AUTO: 88.9 FL — SIGNIFICANT CHANGE UP (ref 80–94)
MCV RBC AUTO: 88.9 FL — SIGNIFICANT CHANGE UP (ref 80–94)
MCV RBC AUTO: 89.2 FL — SIGNIFICANT CHANGE UP (ref 80–94)
MCV RBC AUTO: 89.3 FL — SIGNIFICANT CHANGE UP (ref 80–94)
MCV RBC AUTO: 89.3 FL — SIGNIFICANT CHANGE UP (ref 80–94)
MCV RBC AUTO: 89.5 FL — SIGNIFICANT CHANGE UP (ref 80–94)
MCV RBC AUTO: 89.5 FL — SIGNIFICANT CHANGE UP (ref 80–94)
MCV RBC AUTO: 89.6 FL — SIGNIFICANT CHANGE UP (ref 80–94)
MCV RBC AUTO: 89.6 FL — SIGNIFICANT CHANGE UP (ref 80–94)
MCV RBC AUTO: 90 FL — SIGNIFICANT CHANGE UP (ref 80–94)
MCV RBC AUTO: 90.3 FL — SIGNIFICANT CHANGE UP (ref 80–94)
MCV RBC AUTO: 90.3 FL — SIGNIFICANT CHANGE UP (ref 80–94)
MCV RBC AUTO: 90.5 FL — SIGNIFICANT CHANGE UP (ref 80–94)
MCV RBC AUTO: 90.5 FL — SIGNIFICANT CHANGE UP (ref 80–94)
MCV RBC AUTO: 90.6 FL — SIGNIFICANT CHANGE UP (ref 80–94)
MCV RBC AUTO: 90.6 FL — SIGNIFICANT CHANGE UP (ref 80–94)
MCV RBC AUTO: 91 FL — SIGNIFICANT CHANGE UP (ref 80–94)
MCV RBC AUTO: 91 FL — SIGNIFICANT CHANGE UP (ref 80–94)
MCV RBC AUTO: 91.5 FL — SIGNIFICANT CHANGE UP (ref 80–94)
MCV RBC AUTO: 91.5 FL — SIGNIFICANT CHANGE UP (ref 80–94)
MCV RBC AUTO: 91.8 FL — SIGNIFICANT CHANGE UP (ref 80–94)
MCV RBC AUTO: 91.8 FL — SIGNIFICANT CHANGE UP (ref 80–94)
METHOD TYPE: SIGNIFICANT CHANGE UP
METHOD TYPE: SIGNIFICANT CHANGE UP
MONOCYTES # BLD AUTO: 0.38 K/UL — SIGNIFICANT CHANGE UP (ref 0.1–0.6)
MONOCYTES # BLD AUTO: 0.45 K/UL — SIGNIFICANT CHANGE UP (ref 0.1–0.6)
MONOCYTES # BLD AUTO: 0.45 K/UL — SIGNIFICANT CHANGE UP (ref 0.1–0.6)
MONOCYTES # BLD AUTO: 0.46 K/UL — SIGNIFICANT CHANGE UP (ref 0.1–0.6)
MONOCYTES # BLD AUTO: 0.46 K/UL — SIGNIFICANT CHANGE UP (ref 0.1–0.6)
MONOCYTES # BLD AUTO: 0.48 K/UL — SIGNIFICANT CHANGE UP (ref 0.1–0.6)
MONOCYTES # BLD AUTO: 0.48 K/UL — SIGNIFICANT CHANGE UP (ref 0.1–0.6)
MONOCYTES # BLD AUTO: 0.54 K/UL — SIGNIFICANT CHANGE UP (ref 0.1–0.6)
MONOCYTES # BLD AUTO: 0.54 K/UL — SIGNIFICANT CHANGE UP (ref 0.1–0.6)
MONOCYTES # BLD AUTO: 0.55 K/UL — SIGNIFICANT CHANGE UP (ref 0.1–0.6)
MONOCYTES # BLD AUTO: 0.55 K/UL — SIGNIFICANT CHANGE UP (ref 0.1–0.6)
MONOCYTES # BLD AUTO: 0.64 K/UL — HIGH (ref 0.1–0.6)
MONOCYTES # BLD AUTO: 0.64 K/UL — HIGH (ref 0.1–0.6)
MONOCYTES # BLD AUTO: 0.9 K/UL — HIGH (ref 0.1–0.6)
MONOCYTES # BLD AUTO: 0.9 K/UL — HIGH (ref 0.1–0.6)
MONOCYTES NFR BLD AUTO: 3.3 % — SIGNIFICANT CHANGE UP (ref 1.7–9.3)
MONOCYTES NFR BLD AUTO: 3.3 % — SIGNIFICANT CHANGE UP (ref 1.7–9.3)
MONOCYTES NFR BLD AUTO: 5.1 % — SIGNIFICANT CHANGE UP (ref 1.7–9.3)
MONOCYTES NFR BLD AUTO: 6.2 % — SIGNIFICANT CHANGE UP (ref 1.7–9.3)
MONOCYTES NFR BLD AUTO: 6.2 % — SIGNIFICANT CHANGE UP (ref 1.7–9.3)
MONOCYTES NFR BLD AUTO: 6.3 % — SIGNIFICANT CHANGE UP (ref 1.7–9.3)
MONOCYTES NFR BLD AUTO: 6.3 % — SIGNIFICANT CHANGE UP (ref 1.7–9.3)
MONOCYTES NFR BLD AUTO: 7 % — SIGNIFICANT CHANGE UP (ref 1.7–9.3)
MONOCYTES NFR BLD AUTO: 7 % — SIGNIFICANT CHANGE UP (ref 1.7–9.3)
MONOCYTES NFR BLD AUTO: 7.4 % — SIGNIFICANT CHANGE UP (ref 1.7–9.3)
MONOCYTES NFR BLD AUTO: 7.4 % — SIGNIFICANT CHANGE UP (ref 1.7–9.3)
MONOCYTES NFR BLD AUTO: 8.2 % — SIGNIFICANT CHANGE UP (ref 1.7–9.3)
MONOCYTES NFR BLD AUTO: 8.2 % — SIGNIFICANT CHANGE UP (ref 1.7–9.3)
MONOCYTES NFR BLD AUTO: 8.4 % — SIGNIFICANT CHANGE UP (ref 1.7–9.3)
MONOCYTES NFR BLD AUTO: 8.4 % — SIGNIFICANT CHANGE UP (ref 1.7–9.3)
MRSA PCR RESULT.: NEGATIVE — SIGNIFICANT CHANGE UP
MRSA PCR RESULT.: NEGATIVE — SIGNIFICANT CHANGE UP
MUSK IGG SER IA-MCNC: SIGNIFICANT CHANGE UP
MUSK IGG SER IA-MCNC: SIGNIFICANT CHANGE UP
NEUTROPHILS # BLD AUTO: 10.57 K/UL — HIGH (ref 1.4–6.5)
NEUTROPHILS # BLD AUTO: 10.57 K/UL — HIGH (ref 1.4–6.5)
NEUTROPHILS # BLD AUTO: 12.85 K/UL — HIGH (ref 1.4–6.5)
NEUTROPHILS # BLD AUTO: 12.85 K/UL — HIGH (ref 1.4–6.5)
NEUTROPHILS # BLD AUTO: 4.97 K/UL — SIGNIFICANT CHANGE UP (ref 1.4–6.5)
NEUTROPHILS # BLD AUTO: 4.97 K/UL — SIGNIFICANT CHANGE UP (ref 1.4–6.5)
NEUTROPHILS # BLD AUTO: 5.09 K/UL — SIGNIFICANT CHANGE UP (ref 1.4–6.5)
NEUTROPHILS # BLD AUTO: 5.09 K/UL — SIGNIFICANT CHANGE UP (ref 1.4–6.5)
NEUTROPHILS # BLD AUTO: 6.11 K/UL — SIGNIFICANT CHANGE UP (ref 1.4–6.5)
NEUTROPHILS # BLD AUTO: 6.16 K/UL — SIGNIFICANT CHANGE UP (ref 1.4–6.5)
NEUTROPHILS # BLD AUTO: 6.16 K/UL — SIGNIFICANT CHANGE UP (ref 1.4–6.5)
NEUTROPHILS # BLD AUTO: 6.5 K/UL — SIGNIFICANT CHANGE UP (ref 1.4–6.5)
NEUTROPHILS # BLD AUTO: 6.5 K/UL — SIGNIFICANT CHANGE UP (ref 1.4–6.5)
NEUTROPHILS # BLD AUTO: 6.9 K/UL — HIGH (ref 1.4–6.5)
NEUTROPHILS # BLD AUTO: 6.9 K/UL — HIGH (ref 1.4–6.5)
NEUTROPHILS NFR BLD AUTO: 75.6 % — HIGH (ref 42.2–75.2)
NEUTROPHILS NFR BLD AUTO: 75.6 % — HIGH (ref 42.2–75.2)
NEUTROPHILS NFR BLD AUTO: 78.9 % — HIGH (ref 42.2–75.2)
NEUTROPHILS NFR BLD AUTO: 78.9 % — HIGH (ref 42.2–75.2)
NEUTROPHILS NFR BLD AUTO: 79.7 % — HIGH (ref 42.2–75.2)
NEUTROPHILS NFR BLD AUTO: 79.7 % — HIGH (ref 42.2–75.2)
NEUTROPHILS NFR BLD AUTO: 80.4 % — HIGH (ref 42.2–75.2)
NEUTROPHILS NFR BLD AUTO: 80.4 % — HIGH (ref 42.2–75.2)
NEUTROPHILS NFR BLD AUTO: 82.6 % — HIGH (ref 42.2–75.2)
NEUTROPHILS NFR BLD AUTO: 83.7 % — HIGH (ref 42.2–75.2)
NEUTROPHILS NFR BLD AUTO: 83.7 % — HIGH (ref 42.2–75.2)
NEUTROPHILS NFR BLD AUTO: 87.4 % — HIGH (ref 42.2–75.2)
NEUTROPHILS NFR BLD AUTO: 87.4 % — HIGH (ref 42.2–75.2)
NEUTROPHILS NFR BLD AUTO: 92.2 % — HIGH (ref 42.2–75.2)
NEUTROPHILS NFR BLD AUTO: 92.2 % — HIGH (ref 42.2–75.2)
NITRITE UR-MCNC: NEGATIVE — SIGNIFICANT CHANGE UP
NITRITE UR-MCNC: POSITIVE
NITRITE UR-MCNC: POSITIVE
NON HDL CHOLESTEROL: 116 MG/DL — SIGNIFICANT CHANGE UP
NON HDL CHOLESTEROL: 116 MG/DL — SIGNIFICANT CHANGE UP
NRBC # BLD: 0 /100 WBCS — SIGNIFICANT CHANGE UP (ref 0–0)
ORGANISM # SPEC MICROSCOPIC CNT: SIGNIFICANT CHANGE UP
PH UR: 6 — SIGNIFICANT CHANGE UP (ref 5–8)
PH UR: 7.5 — SIGNIFICANT CHANGE UP (ref 5–8)
PLATELET # BLD AUTO: 141 K/UL — SIGNIFICANT CHANGE UP (ref 130–400)
PLATELET # BLD AUTO: 141 K/UL — SIGNIFICANT CHANGE UP (ref 130–400)
PLATELET # BLD AUTO: 152 K/UL — SIGNIFICANT CHANGE UP (ref 130–400)
PLATELET # BLD AUTO: 152 K/UL — SIGNIFICANT CHANGE UP (ref 130–400)
PLATELET # BLD AUTO: 163 K/UL — SIGNIFICANT CHANGE UP (ref 130–400)
PLATELET # BLD AUTO: 163 K/UL — SIGNIFICANT CHANGE UP (ref 130–400)
PLATELET # BLD AUTO: 170 K/UL — SIGNIFICANT CHANGE UP (ref 130–400)
PLATELET # BLD AUTO: 170 K/UL — SIGNIFICANT CHANGE UP (ref 130–400)
PLATELET # BLD AUTO: 175 K/UL — SIGNIFICANT CHANGE UP (ref 130–400)
PLATELET # BLD AUTO: 185 K/UL — SIGNIFICANT CHANGE UP (ref 130–400)
PLATELET # BLD AUTO: 185 K/UL — SIGNIFICANT CHANGE UP (ref 130–400)
PLATELET # BLD AUTO: 190 K/UL — SIGNIFICANT CHANGE UP (ref 130–400)
PLATELET # BLD AUTO: 190 K/UL — SIGNIFICANT CHANGE UP (ref 130–400)
PLATELET # BLD AUTO: 194 K/UL — SIGNIFICANT CHANGE UP (ref 130–400)
PLATELET # BLD AUTO: 194 K/UL — SIGNIFICANT CHANGE UP (ref 130–400)
PLATELET # BLD AUTO: 200 K/UL — SIGNIFICANT CHANGE UP (ref 130–400)
PLATELET # BLD AUTO: 200 K/UL — SIGNIFICANT CHANGE UP (ref 130–400)
PLATELET # BLD AUTO: 212 K/UL — SIGNIFICANT CHANGE UP (ref 130–400)
PLATELET # BLD AUTO: 212 K/UL — SIGNIFICANT CHANGE UP (ref 130–400)
PLATELET # BLD AUTO: 221 K/UL — SIGNIFICANT CHANGE UP (ref 130–400)
PLATELET # BLD AUTO: 221 K/UL — SIGNIFICANT CHANGE UP (ref 130–400)
PLATELET # BLD AUTO: 228 K/UL — SIGNIFICANT CHANGE UP (ref 130–400)
PLATELET # BLD AUTO: 228 K/UL — SIGNIFICANT CHANGE UP (ref 130–400)
PLATELET # BLD AUTO: 240 K/UL — SIGNIFICANT CHANGE UP (ref 130–400)
PLATELET # BLD AUTO: 240 K/UL — SIGNIFICANT CHANGE UP (ref 130–400)
PLATELET # BLD AUTO: 246 K/UL — SIGNIFICANT CHANGE UP (ref 130–400)
PLATELET # BLD AUTO: 249 K/UL — SIGNIFICANT CHANGE UP (ref 130–400)
PLATELET # BLD AUTO: 249 K/UL — SIGNIFICANT CHANGE UP (ref 130–400)
PLATELET # BLD AUTO: 276 K/UL — SIGNIFICANT CHANGE UP (ref 130–400)
PLATELET # BLD AUTO: 276 K/UL — SIGNIFICANT CHANGE UP (ref 130–400)
PLATELET # BLD AUTO: 282 K/UL — SIGNIFICANT CHANGE UP (ref 130–400)
PLATELET # BLD AUTO: 282 K/UL — SIGNIFICANT CHANGE UP (ref 130–400)
PLATELET # BLD AUTO: 333 K/UL — SIGNIFICANT CHANGE UP (ref 130–400)
PLATELET # BLD AUTO: 333 K/UL — SIGNIFICANT CHANGE UP (ref 130–400)
PLATELET # BLD AUTO: 365 K/UL — SIGNIFICANT CHANGE UP (ref 130–400)
PLATELET # BLD AUTO: 365 K/UL — SIGNIFICANT CHANGE UP (ref 130–400)
PLATELET # BLD AUTO: 367 K/UL — SIGNIFICANT CHANGE UP (ref 130–400)
PLATELET # BLD AUTO: 367 K/UL — SIGNIFICANT CHANGE UP (ref 130–400)
PLATELET # BLD AUTO: 372 K/UL — SIGNIFICANT CHANGE UP (ref 130–400)
PLATELET # BLD AUTO: 372 K/UL — SIGNIFICANT CHANGE UP (ref 130–400)
PMV BLD: 10.3 FL — SIGNIFICANT CHANGE UP (ref 7.4–10.4)
PMV BLD: 10.6 FL — HIGH (ref 7.4–10.4)
PMV BLD: 10.6 FL — HIGH (ref 7.4–10.4)
PMV BLD: 10.7 FL — HIGH (ref 7.4–10.4)
PMV BLD: 10.7 FL — HIGH (ref 7.4–10.4)
PMV BLD: 10.8 FL — HIGH (ref 7.4–10.4)
PMV BLD: 10.9 FL — HIGH (ref 7.4–10.4)
PMV BLD: 11.1 FL — HIGH (ref 7.4–10.4)
PMV BLD: 11.1 FL — HIGH (ref 7.4–10.4)
PMV BLD: 11.2 FL — HIGH (ref 7.4–10.4)
PMV BLD: 11.4 FL — HIGH (ref 7.4–10.4)
PMV BLD: 11.5 FL — HIGH (ref 7.4–10.4)
PMV BLD: 11.6 FL — HIGH (ref 7.4–10.4)
PMV BLD: 11.6 FL — HIGH (ref 7.4–10.4)
PMV BLD: 11.8 FL — HIGH (ref 7.4–10.4)
PMV BLD: 12 FL — HIGH (ref 7.4–10.4)
POTASSIUM SERPL-MCNC: 3.1 MMOL/L — LOW (ref 3.5–5)
POTASSIUM SERPL-MCNC: 3.1 MMOL/L — LOW (ref 3.5–5)
POTASSIUM SERPL-MCNC: 3.2 MMOL/L — LOW (ref 3.5–5)
POTASSIUM SERPL-MCNC: 3.2 MMOL/L — LOW (ref 3.5–5)
POTASSIUM SERPL-MCNC: 3.3 MMOL/L — LOW (ref 3.5–5)
POTASSIUM SERPL-MCNC: 3.4 MMOL/L — LOW (ref 3.5–5)
POTASSIUM SERPL-MCNC: 3.6 MMOL/L — SIGNIFICANT CHANGE UP (ref 3.5–5)
POTASSIUM SERPL-MCNC: 3.8 MMOL/L — SIGNIFICANT CHANGE UP (ref 3.5–5)
POTASSIUM SERPL-MCNC: 3.9 MMOL/L — SIGNIFICANT CHANGE UP (ref 3.5–5)
POTASSIUM SERPL-MCNC: 4 MMOL/L — SIGNIFICANT CHANGE UP (ref 3.5–5)
POTASSIUM SERPL-MCNC: 4 MMOL/L — SIGNIFICANT CHANGE UP (ref 3.5–5)
POTASSIUM SERPL-MCNC: 4.2 MMOL/L — SIGNIFICANT CHANGE UP (ref 3.5–5)
POTASSIUM SERPL-MCNC: 4.2 MMOL/L — SIGNIFICANT CHANGE UP (ref 3.5–5)
POTASSIUM SERPL-MCNC: 4.3 MMOL/L — SIGNIFICANT CHANGE UP (ref 3.5–5)
POTASSIUM SERPL-MCNC: 4.3 MMOL/L — SIGNIFICANT CHANGE UP (ref 3.5–5)
POTASSIUM SERPL-MCNC: 4.6 MMOL/L — SIGNIFICANT CHANGE UP (ref 3.5–5)
POTASSIUM SERPL-MCNC: 5 MMOL/L — SIGNIFICANT CHANGE UP (ref 3.5–5)
POTASSIUM SERPL-MCNC: 5 MMOL/L — SIGNIFICANT CHANGE UP (ref 3.5–5)
POTASSIUM SERPL-SCNC: 3.1 MMOL/L — LOW (ref 3.5–5)
POTASSIUM SERPL-SCNC: 3.1 MMOL/L — LOW (ref 3.5–5)
POTASSIUM SERPL-SCNC: 3.2 MMOL/L — LOW (ref 3.5–5)
POTASSIUM SERPL-SCNC: 3.2 MMOL/L — LOW (ref 3.5–5)
POTASSIUM SERPL-SCNC: 3.3 MMOL/L — LOW (ref 3.5–5)
POTASSIUM SERPL-SCNC: 3.4 MMOL/L — LOW (ref 3.5–5)
POTASSIUM SERPL-SCNC: 3.6 MMOL/L — SIGNIFICANT CHANGE UP (ref 3.5–5)
POTASSIUM SERPL-SCNC: 3.8 MMOL/L — SIGNIFICANT CHANGE UP (ref 3.5–5)
POTASSIUM SERPL-SCNC: 3.9 MMOL/L — SIGNIFICANT CHANGE UP (ref 3.5–5)
POTASSIUM SERPL-SCNC: 4 MMOL/L — SIGNIFICANT CHANGE UP (ref 3.5–5)
POTASSIUM SERPL-SCNC: 4 MMOL/L — SIGNIFICANT CHANGE UP (ref 3.5–5)
POTASSIUM SERPL-SCNC: 4.2 MMOL/L — SIGNIFICANT CHANGE UP (ref 3.5–5)
POTASSIUM SERPL-SCNC: 4.2 MMOL/L — SIGNIFICANT CHANGE UP (ref 3.5–5)
POTASSIUM SERPL-SCNC: 4.3 MMOL/L — SIGNIFICANT CHANGE UP (ref 3.5–5)
POTASSIUM SERPL-SCNC: 4.3 MMOL/L — SIGNIFICANT CHANGE UP (ref 3.5–5)
POTASSIUM SERPL-SCNC: 4.6 MMOL/L — SIGNIFICANT CHANGE UP (ref 3.5–5)
POTASSIUM SERPL-SCNC: 5 MMOL/L — SIGNIFICANT CHANGE UP (ref 3.5–5)
POTASSIUM SERPL-SCNC: 5 MMOL/L — SIGNIFICANT CHANGE UP (ref 3.5–5)
PROT SERPL-MCNC: 4.7 G/DL — LOW (ref 6–8)
PROT SERPL-MCNC: 4.7 G/DL — LOW (ref 6–8)
PROT SERPL-MCNC: 4.8 G/DL — LOW (ref 6–8)
PROT SERPL-MCNC: 4.8 G/DL — LOW (ref 6–8)
PROT SERPL-MCNC: 5 G/DL — LOW (ref 6–8)
PROT SERPL-MCNC: 5 G/DL — LOW (ref 6–8)
PROT SERPL-MCNC: 5.1 G/DL — LOW (ref 6–8)
PROT SERPL-MCNC: 5.2 G/DL — LOW (ref 6–8)
PROT SERPL-MCNC: 5.2 G/DL — LOW (ref 6–8)
PROT SERPL-MCNC: 5.4 G/DL — LOW (ref 6–8)
PROT SERPL-MCNC: 5.4 G/DL — LOW (ref 6–8)
PROT SERPL-MCNC: 5.6 G/DL — LOW (ref 6–8)
PROT SERPL-MCNC: 5.7 G/DL — LOW (ref 6–8)
PROT SERPL-MCNC: 5.7 G/DL — LOW (ref 6–8)
PROT SERPL-MCNC: 5.9 G/DL — LOW (ref 6–8)
PROT SERPL-MCNC: 6 G/DL — SIGNIFICANT CHANGE UP (ref 6–8)
PROT SERPL-MCNC: 6 G/DL — SIGNIFICANT CHANGE UP (ref 6–8)
PROT SERPL-MCNC: 6.2 G/DL — SIGNIFICANT CHANGE UP (ref 6–8)
PROT SERPL-MCNC: 6.2 G/DL — SIGNIFICANT CHANGE UP (ref 6–8)
PROT SERPL-MCNC: 6.3 G/DL — SIGNIFICANT CHANGE UP (ref 6–8)
PROT SERPL-MCNC: 6.3 G/DL — SIGNIFICANT CHANGE UP (ref 6–8)
PROT SERPL-MCNC: 6.4 G/DL — SIGNIFICANT CHANGE UP (ref 6–8)
PROT UR-MCNC: 300 MG/DL
PROT UR-MCNC: NEGATIVE MG/DL — SIGNIFICANT CHANGE UP
PROTHROM AB SERPL-ACNC: 15.2 SEC — HIGH (ref 9.95–12.87)
PROTHROM AB SERPL-ACNC: 15.2 SEC — HIGH (ref 9.95–12.87)
PSA FLD-MCNC: 1.52 NG/ML — SIGNIFICANT CHANGE UP (ref 0–4)
PSA FLD-MCNC: 1.52 NG/ML — SIGNIFICANT CHANGE UP (ref 0–4)
RBC # BLD: 3.11 M/UL — LOW (ref 4.7–6.1)
RBC # BLD: 3.11 M/UL — LOW (ref 4.7–6.1)
RBC # BLD: 3.22 M/UL — LOW (ref 4.7–6.1)
RBC # BLD: 3.22 M/UL — LOW (ref 4.7–6.1)
RBC # BLD: 3.28 M/UL — LOW (ref 4.7–6.1)
RBC # BLD: 3.28 M/UL — LOW (ref 4.7–6.1)
RBC # BLD: 3.3 M/UL — LOW (ref 4.7–6.1)
RBC # BLD: 3.34 M/UL — LOW (ref 4.7–6.1)
RBC # BLD: 3.34 M/UL — LOW (ref 4.7–6.1)
RBC # BLD: 3.37 M/UL — LOW (ref 4.7–6.1)
RBC # BLD: 3.37 M/UL — LOW (ref 4.7–6.1)
RBC # BLD: 3.44 M/UL — LOW (ref 4.7–6.1)
RBC # BLD: 3.44 M/UL — LOW (ref 4.7–6.1)
RBC # BLD: 3.6 M/UL — LOW (ref 4.7–6.1)
RBC # BLD: 3.68 M/UL — LOW (ref 4.7–6.1)
RBC # BLD: 3.68 M/UL — LOW (ref 4.7–6.1)
RBC # BLD: 3.69 M/UL — LOW (ref 4.7–6.1)
RBC # BLD: 3.69 M/UL — LOW (ref 4.7–6.1)
RBC # BLD: 3.72 M/UL — LOW (ref 4.7–6.1)
RBC # BLD: 3.72 M/UL — LOW (ref 4.7–6.1)
RBC # BLD: 3.79 M/UL — LOW (ref 4.7–6.1)
RBC # BLD: 3.79 M/UL — LOW (ref 4.7–6.1)
RBC # BLD: 3.9 M/UL — LOW (ref 4.7–6.1)
RBC # BLD: 3.9 M/UL — LOW (ref 4.7–6.1)
RBC # BLD: 3.94 M/UL — LOW (ref 4.7–6.1)
RBC # BLD: 3.94 M/UL — LOW (ref 4.7–6.1)
RBC # BLD: 3.97 M/UL — LOW (ref 4.7–6.1)
RBC # BLD: 3.97 M/UL — LOW (ref 4.7–6.1)
RBC # BLD: 4.12 M/UL — LOW (ref 4.7–6.1)
RBC # BLD: 4.12 M/UL — LOW (ref 4.7–6.1)
RBC # BLD: 4.18 M/UL — LOW (ref 4.7–6.1)
RBC # BLD: 4.3 M/UL — LOW (ref 4.7–6.1)
RBC # BLD: 4.3 M/UL — LOW (ref 4.7–6.1)
RBC # BLD: 4.63 M/UL — LOW (ref 4.7–6.1)
RBC # BLD: 4.63 M/UL — LOW (ref 4.7–6.1)
RBC # FLD: 13.2 % — SIGNIFICANT CHANGE UP (ref 11.5–14.5)
RBC # FLD: 13.4 % — SIGNIFICANT CHANGE UP (ref 11.5–14.5)
RBC # FLD: 13.4 % — SIGNIFICANT CHANGE UP (ref 11.5–14.5)
RBC # FLD: 13.5 % — SIGNIFICANT CHANGE UP (ref 11.5–14.5)
RBC # FLD: 13.6 % — SIGNIFICANT CHANGE UP (ref 11.5–14.5)
RBC # FLD: 13.7 % — SIGNIFICANT CHANGE UP (ref 11.5–14.5)
RBC # FLD: 13.7 % — SIGNIFICANT CHANGE UP (ref 11.5–14.5)
RBC # FLD: 13.8 % — SIGNIFICANT CHANGE UP (ref 11.5–14.5)
RBC # FLD: 13.9 % — SIGNIFICANT CHANGE UP (ref 11.5–14.5)
RBC # FLD: 14 % — SIGNIFICANT CHANGE UP (ref 11.5–14.5)
RBC # FLD: 14.1 % — SIGNIFICANT CHANGE UP (ref 11.5–14.5)
RBC # FLD: 14.1 % — SIGNIFICANT CHANGE UP (ref 11.5–14.5)
RBC # FLD: 14.2 % — SIGNIFICANT CHANGE UP (ref 11.5–14.5)
RBC # FLD: 14.3 % — SIGNIFICANT CHANGE UP (ref 11.5–14.5)
RBC CASTS # UR COMP ASSIST: 1 /HPF — SIGNIFICANT CHANGE UP (ref 0–4)
RBC CASTS # UR COMP ASSIST: >1900 /HPF — HIGH (ref 0–4)
SARS-COV-2 RNA SPEC QL NAA+PROBE: SIGNIFICANT CHANGE UP
SARS-COV-2 RNA SPEC QL NAA+PROBE: SIGNIFICANT CHANGE UP
SODIUM SERPL-SCNC: 134 MMOL/L — LOW (ref 135–146)
SODIUM SERPL-SCNC: 134 MMOL/L — LOW (ref 135–146)
SODIUM SERPL-SCNC: 138 MMOL/L — SIGNIFICANT CHANGE UP (ref 135–146)
SODIUM SERPL-SCNC: 139 MMOL/L — SIGNIFICANT CHANGE UP (ref 135–146)
SODIUM SERPL-SCNC: 140 MMOL/L — SIGNIFICANT CHANGE UP (ref 135–146)
SODIUM SERPL-SCNC: 141 MMOL/L — SIGNIFICANT CHANGE UP (ref 135–146)
SODIUM SERPL-SCNC: 142 MMOL/L — SIGNIFICANT CHANGE UP (ref 135–146)
SODIUM SERPL-SCNC: 145 MMOL/L — SIGNIFICANT CHANGE UP (ref 135–146)
SODIUM SERPL-SCNC: 145 MMOL/L — SIGNIFICANT CHANGE UP (ref 135–146)
SODIUM SERPL-SCNC: 146 MMOL/L — SIGNIFICANT CHANGE UP (ref 135–146)
SODIUM SERPL-SCNC: 146 MMOL/L — SIGNIFICANT CHANGE UP (ref 135–146)
SP GR SPEC: 1.01 — SIGNIFICANT CHANGE UP (ref 1–1.03)
SP GR SPEC: >1.03 — HIGH (ref 1–1.03)
SPECIMEN SOURCE: SIGNIFICANT CHANGE UP
SQUAMOUS # UR AUTO: 0 /HPF — SIGNIFICANT CHANGE UP (ref 0–5)
SQUAMOUS # UR AUTO: 2 /HPF — SIGNIFICANT CHANGE UP (ref 0–5)
SQUAMOUS # UR AUTO: 2 /HPF — SIGNIFICANT CHANGE UP (ref 0–5)
T PALLIDUM AB TITR SER: NEGATIVE — SIGNIFICANT CHANGE UP
T PALLIDUM AB TITR SER: NEGATIVE — SIGNIFICANT CHANGE UP
TRIGL SERPL-MCNC: 61 MG/DL — SIGNIFICANT CHANGE UP
TRIGL SERPL-MCNC: 61 MG/DL — SIGNIFICANT CHANGE UP
TROPONIN T SERPL-MCNC: <0.01 NG/ML — SIGNIFICANT CHANGE UP
TROPONIN T SERPL-MCNC: <0.01 NG/ML — SIGNIFICANT CHANGE UP
TSH SERPL-MCNC: 2.63 UIU/ML — SIGNIFICANT CHANGE UP (ref 0.27–4.2)
TSH SERPL-MCNC: 2.63 UIU/ML — SIGNIFICANT CHANGE UP (ref 0.27–4.2)
UROBILINOGEN FLD QL: 0.2 MG/DL — SIGNIFICANT CHANGE UP (ref 0.2–1)
UROBILINOGEN FLD QL: 1 MG/DL — SIGNIFICANT CHANGE UP (ref 0.2–1)
VIT B12 SERPL-MCNC: 302 PG/ML — SIGNIFICANT CHANGE UP (ref 232–1245)
VIT B12 SERPL-MCNC: 302 PG/ML — SIGNIFICANT CHANGE UP (ref 232–1245)
WBC # BLD: 11.66 K/UL — HIGH (ref 4.8–10.8)
WBC # BLD: 11.66 K/UL — HIGH (ref 4.8–10.8)
WBC # BLD: 12.09 K/UL — HIGH (ref 4.8–10.8)
WBC # BLD: 12.09 K/UL — HIGH (ref 4.8–10.8)
WBC # BLD: 13.94 K/UL — HIGH (ref 4.8–10.8)
WBC # BLD: 13.94 K/UL — HIGH (ref 4.8–10.8)
WBC # BLD: 6.32 K/UL — SIGNIFICANT CHANGE UP (ref 4.8–10.8)
WBC # BLD: 6.32 K/UL — SIGNIFICANT CHANGE UP (ref 4.8–10.8)
WBC # BLD: 6.33 K/UL — SIGNIFICANT CHANGE UP (ref 4.8–10.8)
WBC # BLD: 6.33 K/UL — SIGNIFICANT CHANGE UP (ref 4.8–10.8)
WBC # BLD: 6.39 K/UL — SIGNIFICANT CHANGE UP (ref 4.8–10.8)
WBC # BLD: 6.39 K/UL — SIGNIFICANT CHANGE UP (ref 4.8–10.8)
WBC # BLD: 6.57 K/UL — SIGNIFICANT CHANGE UP (ref 4.8–10.8)
WBC # BLD: 6.57 K/UL — SIGNIFICANT CHANGE UP (ref 4.8–10.8)
WBC # BLD: 6.58 K/UL — SIGNIFICANT CHANGE UP (ref 4.8–10.8)
WBC # BLD: 7.36 K/UL — SIGNIFICANT CHANGE UP (ref 4.8–10.8)
WBC # BLD: 7.36 K/UL — SIGNIFICANT CHANGE UP (ref 4.8–10.8)
WBC # BLD: 7.4 K/UL — SIGNIFICANT CHANGE UP (ref 4.8–10.8)
WBC # BLD: 7.54 K/UL — SIGNIFICANT CHANGE UP (ref 4.8–10.8)
WBC # BLD: 7.54 K/UL — SIGNIFICANT CHANGE UP (ref 4.8–10.8)
WBC # BLD: 7.61 K/UL — SIGNIFICANT CHANGE UP (ref 4.8–10.8)
WBC # BLD: 7.61 K/UL — SIGNIFICANT CHANGE UP (ref 4.8–10.8)
WBC # BLD: 7.63 K/UL — SIGNIFICANT CHANGE UP (ref 4.8–10.8)
WBC # BLD: 7.63 K/UL — SIGNIFICANT CHANGE UP (ref 4.8–10.8)
WBC # BLD: 7.74 K/UL — SIGNIFICANT CHANGE UP (ref 4.8–10.8)
WBC # BLD: 7.74 K/UL — SIGNIFICANT CHANGE UP (ref 4.8–10.8)
WBC # BLD: 7.76 K/UL — SIGNIFICANT CHANGE UP (ref 4.8–10.8)
WBC # BLD: 7.76 K/UL — SIGNIFICANT CHANGE UP (ref 4.8–10.8)
WBC # BLD: 7.81 K/UL — SIGNIFICANT CHANGE UP (ref 4.8–10.8)
WBC # BLD: 7.81 K/UL — SIGNIFICANT CHANGE UP (ref 4.8–10.8)
WBC # BLD: 8.15 K/UL — SIGNIFICANT CHANGE UP (ref 4.8–10.8)
WBC # BLD: 8.15 K/UL — SIGNIFICANT CHANGE UP (ref 4.8–10.8)
WBC # BLD: 8.57 K/UL — SIGNIFICANT CHANGE UP (ref 4.8–10.8)
WBC # BLD: 8.57 K/UL — SIGNIFICANT CHANGE UP (ref 4.8–10.8)
WBC # BLD: 8.99 K/UL — SIGNIFICANT CHANGE UP (ref 4.8–10.8)
WBC # BLD: 8.99 K/UL — SIGNIFICANT CHANGE UP (ref 4.8–10.8)
WBC # BLD: 9.46 K/UL — SIGNIFICANT CHANGE UP (ref 4.8–10.8)
WBC # BLD: 9.46 K/UL — SIGNIFICANT CHANGE UP (ref 4.8–10.8)
WBC # BLD: 9.97 K/UL — SIGNIFICANT CHANGE UP (ref 4.8–10.8)
WBC # BLD: 9.97 K/UL — SIGNIFICANT CHANGE UP (ref 4.8–10.8)
WBC # FLD AUTO: 11.66 K/UL — HIGH (ref 4.8–10.8)
WBC # FLD AUTO: 11.66 K/UL — HIGH (ref 4.8–10.8)
WBC # FLD AUTO: 12.09 K/UL — HIGH (ref 4.8–10.8)
WBC # FLD AUTO: 12.09 K/UL — HIGH (ref 4.8–10.8)
WBC # FLD AUTO: 13.94 K/UL — HIGH (ref 4.8–10.8)
WBC # FLD AUTO: 13.94 K/UL — HIGH (ref 4.8–10.8)
WBC # FLD AUTO: 6.32 K/UL — SIGNIFICANT CHANGE UP (ref 4.8–10.8)
WBC # FLD AUTO: 6.32 K/UL — SIGNIFICANT CHANGE UP (ref 4.8–10.8)
WBC # FLD AUTO: 6.33 K/UL — SIGNIFICANT CHANGE UP (ref 4.8–10.8)
WBC # FLD AUTO: 6.33 K/UL — SIGNIFICANT CHANGE UP (ref 4.8–10.8)
WBC # FLD AUTO: 6.39 K/UL — SIGNIFICANT CHANGE UP (ref 4.8–10.8)
WBC # FLD AUTO: 6.39 K/UL — SIGNIFICANT CHANGE UP (ref 4.8–10.8)
WBC # FLD AUTO: 6.57 K/UL — SIGNIFICANT CHANGE UP (ref 4.8–10.8)
WBC # FLD AUTO: 6.57 K/UL — SIGNIFICANT CHANGE UP (ref 4.8–10.8)
WBC # FLD AUTO: 6.58 K/UL — SIGNIFICANT CHANGE UP (ref 4.8–10.8)
WBC # FLD AUTO: 7.36 K/UL — SIGNIFICANT CHANGE UP (ref 4.8–10.8)
WBC # FLD AUTO: 7.36 K/UL — SIGNIFICANT CHANGE UP (ref 4.8–10.8)
WBC # FLD AUTO: 7.4 K/UL — SIGNIFICANT CHANGE UP (ref 4.8–10.8)
WBC # FLD AUTO: 7.54 K/UL — SIGNIFICANT CHANGE UP (ref 4.8–10.8)
WBC # FLD AUTO: 7.54 K/UL — SIGNIFICANT CHANGE UP (ref 4.8–10.8)
WBC # FLD AUTO: 7.61 K/UL — SIGNIFICANT CHANGE UP (ref 4.8–10.8)
WBC # FLD AUTO: 7.61 K/UL — SIGNIFICANT CHANGE UP (ref 4.8–10.8)
WBC # FLD AUTO: 7.63 K/UL — SIGNIFICANT CHANGE UP (ref 4.8–10.8)
WBC # FLD AUTO: 7.63 K/UL — SIGNIFICANT CHANGE UP (ref 4.8–10.8)
WBC # FLD AUTO: 7.74 K/UL — SIGNIFICANT CHANGE UP (ref 4.8–10.8)
WBC # FLD AUTO: 7.74 K/UL — SIGNIFICANT CHANGE UP (ref 4.8–10.8)
WBC # FLD AUTO: 7.76 K/UL — SIGNIFICANT CHANGE UP (ref 4.8–10.8)
WBC # FLD AUTO: 7.76 K/UL — SIGNIFICANT CHANGE UP (ref 4.8–10.8)
WBC # FLD AUTO: 7.81 K/UL — SIGNIFICANT CHANGE UP (ref 4.8–10.8)
WBC # FLD AUTO: 7.81 K/UL — SIGNIFICANT CHANGE UP (ref 4.8–10.8)
WBC # FLD AUTO: 8.15 K/UL — SIGNIFICANT CHANGE UP (ref 4.8–10.8)
WBC # FLD AUTO: 8.15 K/UL — SIGNIFICANT CHANGE UP (ref 4.8–10.8)
WBC # FLD AUTO: 8.57 K/UL — SIGNIFICANT CHANGE UP (ref 4.8–10.8)
WBC # FLD AUTO: 8.57 K/UL — SIGNIFICANT CHANGE UP (ref 4.8–10.8)
WBC # FLD AUTO: 8.99 K/UL — SIGNIFICANT CHANGE UP (ref 4.8–10.8)
WBC # FLD AUTO: 8.99 K/UL — SIGNIFICANT CHANGE UP (ref 4.8–10.8)
WBC # FLD AUTO: 9.46 K/UL — SIGNIFICANT CHANGE UP (ref 4.8–10.8)
WBC # FLD AUTO: 9.46 K/UL — SIGNIFICANT CHANGE UP (ref 4.8–10.8)
WBC # FLD AUTO: 9.97 K/UL — SIGNIFICANT CHANGE UP (ref 4.8–10.8)
WBC # FLD AUTO: 9.97 K/UL — SIGNIFICANT CHANGE UP (ref 4.8–10.8)
WBC UR QL: 6 /HPF — HIGH (ref 0–5)
WBC UR QL: 8 /HPF — HIGH (ref 0–5)
WBC UR QL: 8 /HPF — HIGH (ref 0–5)

## 2023-01-01 PROCEDURE — 72146 MRI CHEST SPINE W/O DYE: CPT | Mod: 26

## 2023-01-01 PROCEDURE — 93010 ELECTROCARDIOGRAM REPORT: CPT

## 2023-01-01 PROCEDURE — 70498 CT ANGIOGRAPHY NECK: CPT

## 2023-01-01 PROCEDURE — 85027 COMPLETE CBC AUTOMATED: CPT

## 2023-01-01 PROCEDURE — 80053 COMPREHEN METABOLIC PANEL: CPT

## 2023-01-01 PROCEDURE — 83615 LACTATE (LD) (LDH) ENZYME: CPT

## 2023-01-01 PROCEDURE — 99232 SBSQ HOSP IP/OBS MODERATE 35: CPT

## 2023-01-01 PROCEDURE — 84443 ASSAY THYROID STIM HORMONE: CPT

## 2023-01-01 PROCEDURE — 33285 INSJ SUBQ CAR RHYTHM MNTR: CPT

## 2023-01-01 PROCEDURE — 99233 SBSQ HOSP IP/OBS HIGH 50: CPT

## 2023-01-01 PROCEDURE — 92526 ORAL FUNCTION THERAPY: CPT | Mod: GN

## 2023-01-01 PROCEDURE — 87086 URINE CULTURE/COLONY COUNT: CPT

## 2023-01-01 PROCEDURE — 93005 ELECTROCARDIOGRAM TRACING: CPT

## 2023-01-01 PROCEDURE — 83735 ASSAY OF MAGNESIUM: CPT

## 2023-01-01 PROCEDURE — 99231 SBSQ HOSP IP/OBS SF/LOW 25: CPT

## 2023-01-01 PROCEDURE — 97166 OT EVAL MOD COMPLEX 45 MIN: CPT | Mod: GO

## 2023-01-01 PROCEDURE — 93298 REM INTERROG DEV EVAL SCRMS: CPT

## 2023-01-01 PROCEDURE — 99221 1ST HOSP IP/OBS SF/LOW 40: CPT

## 2023-01-01 PROCEDURE — C1764: CPT

## 2023-01-01 PROCEDURE — 72141 MRI NECK SPINE W/O DYE: CPT

## 2023-01-01 PROCEDURE — 72141 MRI NECK SPINE W/O DYE: CPT | Mod: 26

## 2023-01-01 PROCEDURE — 82550 ASSAY OF CK (CPK): CPT

## 2023-01-01 PROCEDURE — 99223 1ST HOSP IP/OBS HIGH 75: CPT

## 2023-01-01 PROCEDURE — 99222 1ST HOSP IP/OBS MODERATE 55: CPT

## 2023-01-01 PROCEDURE — 93306 TTE W/DOPPLER COMPLETE: CPT | Mod: 26

## 2023-01-01 PROCEDURE — 82607 VITAMIN B-12: CPT

## 2023-01-01 PROCEDURE — G2066: CPT

## 2023-01-01 PROCEDURE — 99223 1ST HOSP IP/OBS HIGH 75: CPT | Mod: 57

## 2023-01-01 PROCEDURE — 80061 LIPID PANEL: CPT

## 2023-01-01 PROCEDURE — 87040 BLOOD CULTURE FOR BACTERIA: CPT

## 2023-01-01 PROCEDURE — 97116 GAIT TRAINING THERAPY: CPT | Mod: GP

## 2023-01-01 PROCEDURE — 99239 HOSP IP/OBS DSCHRG MGMT >30: CPT

## 2023-01-01 PROCEDURE — 93306 TTE W/DOPPLER COMPLETE: CPT

## 2023-01-01 PROCEDURE — 70498 CT ANGIOGRAPHY NECK: CPT | Mod: 26

## 2023-01-01 PROCEDURE — 71045 X-RAY EXAM CHEST 1 VIEW: CPT | Mod: 26

## 2023-01-01 PROCEDURE — 84238 ASSAY NONENDOCRINE RECEPTOR: CPT

## 2023-01-01 PROCEDURE — 86803 HEPATITIS C AB TEST: CPT

## 2023-01-01 PROCEDURE — 83605 ASSAY OF LACTIC ACID: CPT

## 2023-01-01 PROCEDURE — 72146 MRI CHEST SPINE W/O DYE: CPT

## 2023-01-01 PROCEDURE — 87641 MR-STAPH DNA AMP PROBE: CPT

## 2023-01-01 PROCEDURE — 83036 HEMOGLOBIN GLYCOSYLATED A1C: CPT

## 2023-01-01 PROCEDURE — 70496 CT ANGIOGRAPHY HEAD: CPT

## 2023-01-01 PROCEDURE — 86901 BLOOD TYPING SEROLOGIC RH(D): CPT

## 2023-01-01 PROCEDURE — 70496 CT ANGIOGRAPHY HEAD: CPT | Mod: 26

## 2023-01-01 PROCEDURE — 70450 CT HEAD/BRAIN W/O DYE: CPT | Mod: 26,MA

## 2023-01-01 PROCEDURE — 87640 STAPH A DNA AMP PROBE: CPT

## 2023-01-01 PROCEDURE — 70551 MRI BRAIN STEM W/O DYE: CPT | Mod: 26

## 2023-01-01 PROCEDURE — 83519 RIA NONANTIBODY: CPT

## 2023-01-01 PROCEDURE — 99285 EMERGENCY DEPT VISIT HI MDM: CPT

## 2023-01-01 PROCEDURE — 70551 MRI BRAIN STEM W/O DYE: CPT

## 2023-01-01 PROCEDURE — 86780 TREPONEMA PALLIDUM: CPT

## 2023-01-01 PROCEDURE — 70450 CT HEAD/BRAIN W/O DYE: CPT

## 2023-01-01 PROCEDURE — 82553 CREATINE MB FRACTION: CPT

## 2023-01-01 PROCEDURE — 97162 PT EVAL MOD COMPLEX 30 MIN: CPT | Mod: GP

## 2023-01-01 PROCEDURE — 36415 COLL VENOUS BLD VENIPUNCTURE: CPT

## 2023-01-01 PROCEDURE — 81001 URINALYSIS AUTO W/SCOPE: CPT

## 2023-01-01 PROCEDURE — 0042T: CPT

## 2023-01-01 PROCEDURE — 92611 MOTION FLUOROSCOPY/SWALLOW: CPT | Mod: GN

## 2023-01-01 PROCEDURE — 85730 THROMBOPLASTIN TIME PARTIAL: CPT

## 2023-01-01 PROCEDURE — 80048 BASIC METABOLIC PNL TOTAL CA: CPT

## 2023-01-01 PROCEDURE — 86900 BLOOD TYPING SEROLOGIC ABO: CPT

## 2023-01-01 PROCEDURE — 97110 THERAPEUTIC EXERCISES: CPT | Mod: GP

## 2023-01-01 PROCEDURE — 76770 US EXAM ABDO BACK WALL COMP: CPT

## 2023-01-01 PROCEDURE — 84484 ASSAY OF TROPONIN QUANT: CPT

## 2023-01-01 PROCEDURE — 92610 EVALUATE SWALLOWING FUNCTION: CPT | Mod: GN

## 2023-01-01 PROCEDURE — 86850 RBC ANTIBODY SCREEN: CPT

## 2023-01-01 PROCEDURE — 76770 US EXAM ABDO BACK WALL COMP: CPT | Mod: 26

## 2023-01-01 PROCEDURE — 71045 X-RAY EXAM CHEST 1 VIEW: CPT

## 2023-01-01 PROCEDURE — G0103: CPT

## 2023-01-01 PROCEDURE — 87635 SARS-COV-2 COVID-19 AMP PRB: CPT

## 2023-01-01 PROCEDURE — 97530 THERAPEUTIC ACTIVITIES: CPT | Mod: GP

## 2023-01-01 PROCEDURE — 82085 ASSAY OF ALDOLASE: CPT

## 2023-01-01 PROCEDURE — 95819 EEG AWAKE AND ASLEEP: CPT

## 2023-01-01 PROCEDURE — 70450 CT HEAD/BRAIN W/O DYE: CPT | Mod: 26

## 2023-01-01 PROCEDURE — 82962 GLUCOSE BLOOD TEST: CPT

## 2023-01-01 PROCEDURE — 86255 FLUORESCENT ANTIBODY SCREEN: CPT

## 2023-01-01 PROCEDURE — 85610 PROTHROMBIN TIME: CPT

## 2023-01-01 PROCEDURE — 51702 INSERT TEMP BLADDER CATH: CPT

## 2023-01-01 PROCEDURE — 74230 X-RAY XM SWLNG FUNCJ C+: CPT | Mod: 26

## 2023-01-01 PROCEDURE — 95816 EEG AWAKE AND DROWSY: CPT | Mod: 26

## 2023-01-01 PROCEDURE — 85025 COMPLETE CBC W/AUTO DIFF WBC: CPT

## 2023-01-01 PROCEDURE — 74230 X-RAY XM SWLNG FUNCJ C+: CPT

## 2023-01-01 PROCEDURE — 99497 ADVNCD CARE PLAN 30 MIN: CPT

## 2023-01-01 RX ORDER — SODIUM CHLORIDE 9 MG/ML
1000 INJECTION, SOLUTION INTRAVENOUS
Refills: 0 | Status: DISCONTINUED | OUTPATIENT
Start: 2023-01-01 | End: 2023-01-01

## 2023-01-01 RX ORDER — DEXTROSE MONOHYDRATE, SODIUM CHLORIDE, AND POTASSIUM CHLORIDE 50; .745; 4.5 G/1000ML; G/1000ML; G/1000ML
1000 INJECTION, SOLUTION INTRAVENOUS
Refills: 0 | Status: DISCONTINUED | OUTPATIENT
Start: 2023-01-01 | End: 2023-01-01

## 2023-01-01 RX ORDER — POTASSIUM CHLORIDE 20 MEQ
40 PACKET (EA) ORAL ONCE
Refills: 0 | Status: COMPLETED | OUTPATIENT
Start: 2023-01-01 | End: 2023-01-01

## 2023-01-01 RX ORDER — AMPICILLIN TRIHYDRATE 250 MG
500 CAPSULE ORAL EVERY 6 HOURS
Refills: 0 | Status: DISCONTINUED | OUTPATIENT
Start: 2023-01-01 | End: 2023-01-01

## 2023-01-01 RX ORDER — AMPICILLIN SODIUM AND SULBACTAM SODIUM 250; 125 MG/ML; MG/ML
1.5 INJECTION, POWDER, FOR SUSPENSION INTRAMUSCULAR; INTRAVENOUS ONCE
Refills: 0 | Status: COMPLETED | OUTPATIENT
Start: 2023-01-01 | End: 2023-01-01

## 2023-01-01 RX ORDER — LABETALOL HCL 100 MG
5 TABLET ORAL ONCE
Refills: 0 | Status: COMPLETED | OUTPATIENT
Start: 2023-01-01 | End: 2023-01-01

## 2023-01-01 RX ORDER — LISINOPRIL 2.5 MG/1
1 TABLET ORAL
Qty: 0 | Refills: 0 | DISCHARGE
Start: 2023-01-01

## 2023-01-01 RX ORDER — CLOPIDOGREL BISULFATE 75 MG/1
1 TABLET, FILM COATED ORAL
Qty: 0 | Refills: 0 | DISCHARGE
Start: 2023-01-01

## 2023-01-01 RX ORDER — QUETIAPINE FUMARATE 200 MG/1
25 TABLET, FILM COATED ORAL AT BEDTIME
Refills: 0 | Status: DISCONTINUED | OUTPATIENT
Start: 2023-01-01 | End: 2023-01-01

## 2023-01-01 RX ORDER — LISINOPRIL 2.5 MG/1
5 TABLET ORAL DAILY
Refills: 0 | Status: DISCONTINUED | OUTPATIENT
Start: 2023-01-01 | End: 2023-01-01

## 2023-01-01 RX ORDER — ASPIRIN/CALCIUM CARB/MAGNESIUM 324 MG
81 TABLET ORAL DAILY
Refills: 0 | Status: DISCONTINUED | OUTPATIENT
Start: 2023-01-01 | End: 2023-01-01

## 2023-01-01 RX ORDER — LABETALOL HCL 100 MG
10 TABLET ORAL ONCE
Refills: 0 | Status: COMPLETED | OUTPATIENT
Start: 2023-01-01 | End: 2023-01-01

## 2023-01-01 RX ORDER — ASPIRIN/CALCIUM CARB/MAGNESIUM 324 MG
300 TABLET ORAL DAILY
Refills: 0 | Status: DISCONTINUED | OUTPATIENT
Start: 2023-01-01 | End: 2023-01-01

## 2023-01-01 RX ORDER — MAGNESIUM SULFATE 500 MG/ML
2 VIAL (ML) INJECTION ONCE
Refills: 0 | Status: COMPLETED | OUTPATIENT
Start: 2023-01-01 | End: 2023-01-01

## 2023-01-01 RX ORDER — POTASSIUM CHLORIDE 20 MEQ
20 PACKET (EA) ORAL
Refills: 0 | Status: COMPLETED | OUTPATIENT
Start: 2023-01-01 | End: 2023-01-01

## 2023-01-01 RX ORDER — CLOPIDOGREL BISULFATE 75 MG/1
75 TABLET, FILM COATED ORAL DAILY
Refills: 0 | Status: DISCONTINUED | OUTPATIENT
Start: 2023-01-01 | End: 2023-01-01

## 2023-01-01 RX ORDER — AMPICILLIN SODIUM AND SULBACTAM SODIUM 250; 125 MG/ML; MG/ML
3 INJECTION, POWDER, FOR SUSPENSION INTRAMUSCULAR; INTRAVENOUS EVERY 6 HOURS
Refills: 0 | Status: DISCONTINUED | OUTPATIENT
Start: 2023-01-01 | End: 2023-01-01

## 2023-01-01 RX ORDER — SODIUM CHLORIDE 9 MG/ML
1000 INJECTION INTRAMUSCULAR; INTRAVENOUS; SUBCUTANEOUS
Refills: 0 | Status: DISCONTINUED | OUTPATIENT
Start: 2023-01-01 | End: 2023-01-01

## 2023-01-01 RX ORDER — AMPICILLIN SODIUM AND SULBACTAM SODIUM 250; 125 MG/ML; MG/ML
3 INJECTION, POWDER, FOR SUSPENSION INTRAMUSCULAR; INTRAVENOUS ONCE
Refills: 0 | Status: COMPLETED | OUTPATIENT
Start: 2023-01-01 | End: 2023-01-01

## 2023-01-01 RX ORDER — ATORVASTATIN CALCIUM 80 MG/1
1 TABLET, FILM COATED ORAL
Qty: 0 | Refills: 0 | DISCHARGE
Start: 2023-01-01

## 2023-01-01 RX ORDER — SCOPALAMINE 1 MG/3D
1 PATCH, EXTENDED RELEASE TRANSDERMAL
Refills: 0 | Status: DISCONTINUED | OUTPATIENT
Start: 2023-01-01 | End: 2023-01-01

## 2023-01-01 RX ORDER — ASPIRIN/CALCIUM CARB/MAGNESIUM 324 MG
100 TABLET ORAL DAILY
Refills: 0 | Status: DISCONTINUED | OUTPATIENT
Start: 2023-01-01 | End: 2023-01-01

## 2023-01-01 RX ORDER — ENOXAPARIN SODIUM 100 MG/ML
40 INJECTION SUBCUTANEOUS EVERY 24 HOURS
Refills: 0 | Status: DISCONTINUED | OUTPATIENT
Start: 2023-01-01 | End: 2023-01-01

## 2023-01-01 RX ORDER — AMPICILLIN SODIUM AND SULBACTAM SODIUM 250; 125 MG/ML; MG/ML
INJECTION, POWDER, FOR SUSPENSION INTRAMUSCULAR; INTRAVENOUS
Refills: 0 | Status: DISCONTINUED | OUTPATIENT
Start: 2023-01-01 | End: 2023-01-01

## 2023-01-01 RX ORDER — HYDRALAZINE HCL 50 MG
10 TABLET ORAL EVERY 8 HOURS
Refills: 0 | Status: DISCONTINUED | OUTPATIENT
Start: 2023-01-01 | End: 2023-01-01

## 2023-01-01 RX ORDER — PANTOPRAZOLE SODIUM 20 MG/1
40 TABLET, DELAYED RELEASE ORAL
Refills: 0 | Status: DISCONTINUED | OUTPATIENT
Start: 2023-01-01 | End: 2023-01-01

## 2023-01-01 RX ORDER — ASPIRIN/CALCIUM CARB/MAGNESIUM 324 MG
1 TABLET ORAL
Qty: 0 | Refills: 0 | DISCHARGE
Start: 2023-01-01

## 2023-01-01 RX ORDER — SODIUM CHLORIDE 9 MG/ML
500 INJECTION, SOLUTION INTRAVENOUS
Refills: 0 | Status: DISCONTINUED | OUTPATIENT
Start: 2023-01-01 | End: 2023-01-01

## 2023-01-01 RX ORDER — SODIUM CHLORIDE 9 MG/ML
500 INJECTION INTRAMUSCULAR; INTRAVENOUS; SUBCUTANEOUS ONCE
Refills: 0 | Status: DISCONTINUED | OUTPATIENT
Start: 2023-01-01 | End: 2023-01-01

## 2023-01-01 RX ORDER — TAMSULOSIN HYDROCHLORIDE 0.4 MG/1
0.4 CAPSULE ORAL AT BEDTIME
Refills: 0 | Status: DISCONTINUED | OUTPATIENT
Start: 2023-01-01 | End: 2023-01-01

## 2023-01-01 RX ORDER — LISINOPRIL 2.5 MG/1
10 TABLET ORAL DAILY
Refills: 0 | Status: DISCONTINUED | OUTPATIENT
Start: 2023-01-01 | End: 2023-01-01

## 2023-01-01 RX ORDER — PANTOPRAZOLE SODIUM 20 MG/1
1 TABLET, DELAYED RELEASE ORAL
Qty: 0 | Refills: 0 | DISCHARGE
Start: 2023-01-01

## 2023-01-01 RX ORDER — HALOPERIDOL DECANOATE 100 MG/ML
2 INJECTION INTRAMUSCULAR ONCE
Refills: 0 | Status: COMPLETED | OUTPATIENT
Start: 2023-01-01 | End: 2023-01-01

## 2023-01-01 RX ORDER — AMPICILLIN SODIUM AND SULBACTAM SODIUM 250; 125 MG/ML; MG/ML
1.5 INJECTION, POWDER, FOR SUSPENSION INTRAMUSCULAR; INTRAVENOUS EVERY 6 HOURS
Refills: 0 | Status: DISCONTINUED | OUTPATIENT
Start: 2023-01-01 | End: 2023-01-01

## 2023-01-01 RX ORDER — CHLORHEXIDINE GLUCONATE 213 G/1000ML
1 SOLUTION TOPICAL
Refills: 0 | Status: DISCONTINUED | OUTPATIENT
Start: 2023-01-01 | End: 2023-01-01

## 2023-01-01 RX ORDER — TAMSULOSIN HYDROCHLORIDE 0.4 MG/1
1 CAPSULE ORAL
Qty: 0 | Refills: 0 | DISCHARGE
Start: 2023-01-01

## 2023-01-01 RX ORDER — AMOXICILLIN 250 MG/5ML
500 SUSPENSION, RECONSTITUTED, ORAL (ML) ORAL THREE TIMES A DAY
Refills: 0 | Status: DISCONTINUED | OUTPATIENT
Start: 2023-01-01 | End: 2023-01-01

## 2023-01-01 RX ORDER — OLANZAPINE 15 MG/1
1 TABLET, FILM COATED ORAL
Qty: 0 | Refills: 0 | DISCHARGE
Start: 2023-01-01

## 2023-01-01 RX ORDER — POTASSIUM CHLORIDE 20 MEQ
20 PACKET (EA) ORAL ONCE
Refills: 0 | Status: DISCONTINUED | OUTPATIENT
Start: 2023-01-01 | End: 2023-01-01

## 2023-01-01 RX ORDER — ACETAMINOPHEN 500 MG
1000 TABLET ORAL ONCE
Refills: 0 | Status: COMPLETED | OUTPATIENT
Start: 2023-01-01 | End: 2023-01-01

## 2023-01-01 RX ORDER — CHLORHEXIDINE GLUCONATE 213 G/1000ML
1 SOLUTION TOPICAL
Refills: 0 | Status: COMPLETED | OUTPATIENT
Start: 2023-01-01 | End: 2023-01-01

## 2023-01-01 RX ORDER — ATORVASTATIN CALCIUM 80 MG/1
40 TABLET, FILM COATED ORAL AT BEDTIME
Refills: 0 | Status: DISCONTINUED | OUTPATIENT
Start: 2023-01-01 | End: 2023-01-01

## 2023-01-01 RX ORDER — HALOPERIDOL DECANOATE 100 MG/ML
2 INJECTION INTRAMUSCULAR ONCE
Refills: 0 | Status: DISCONTINUED | OUTPATIENT
Start: 2023-01-01 | End: 2023-01-01

## 2023-01-01 RX ORDER — OLANZAPINE 15 MG/1
2.5 TABLET, FILM COATED ORAL EVERY 8 HOURS
Refills: 0 | Status: DISCONTINUED | OUTPATIENT
Start: 2023-01-01 | End: 2023-01-01

## 2023-01-01 RX ORDER — OLANZAPINE 15 MG/1
2.5 TABLET, FILM COATED ORAL AT BEDTIME
Refills: 0 | Status: DISCONTINUED | OUTPATIENT
Start: 2023-01-01 | End: 2023-01-01

## 2023-01-01 RX ORDER — HALOPERIDOL DECANOATE 100 MG/ML
1 INJECTION INTRAMUSCULAR EVERY 8 HOURS
Refills: 0 | Status: DISCONTINUED | OUTPATIENT
Start: 2023-01-01 | End: 2023-01-01

## 2023-01-01 RX ORDER — POTASSIUM CHLORIDE 20 MEQ
20 PACKET (EA) ORAL ONCE
Refills: 0 | Status: COMPLETED | OUTPATIENT
Start: 2023-01-01 | End: 2023-01-01

## 2023-01-01 RX ADMIN — SCOPALAMINE 1 PATCH: 1 PATCH, EXTENDED RELEASE TRANSDERMAL at 19:25

## 2023-01-01 RX ADMIN — CLOPIDOGREL BISULFATE 75 MILLIGRAM(S): 75 TABLET, FILM COATED ORAL at 12:36

## 2023-01-01 RX ADMIN — ENOXAPARIN SODIUM 40 MILLIGRAM(S): 100 INJECTION SUBCUTANEOUS at 16:30

## 2023-01-01 RX ADMIN — Medication 1 PATCH: at 06:27

## 2023-01-01 RX ADMIN — Medication 104 MILLIGRAM(S): at 23:25

## 2023-01-01 RX ADMIN — Medication 1 PATCH: at 06:44

## 2023-01-01 RX ADMIN — SCOPALAMINE 1 PATCH: 1 PATCH, EXTENDED RELEASE TRANSDERMAL at 07:04

## 2023-01-01 RX ADMIN — AMPICILLIN SODIUM AND SULBACTAM SODIUM 100 GRAM(S): 250; 125 INJECTION, POWDER, FOR SUSPENSION INTRAMUSCULAR; INTRAVENOUS at 17:48

## 2023-01-01 RX ADMIN — Medication 5 MILLIGRAM(S): at 06:40

## 2023-01-01 RX ADMIN — HALOPERIDOL DECANOATE 2 MILLIGRAM(S): 100 INJECTION INTRAMUSCULAR at 00:52

## 2023-01-01 RX ADMIN — Medication 1 PATCH: at 21:48

## 2023-01-01 RX ADMIN — SODIUM CHLORIDE 75 MILLILITER(S): 9 INJECTION, SOLUTION INTRAVENOUS at 21:56

## 2023-01-01 RX ADMIN — TAMSULOSIN HYDROCHLORIDE 0.4 MILLIGRAM(S): 0.4 CAPSULE ORAL at 21:09

## 2023-01-01 RX ADMIN — Medication 1 PATCH: at 17:20

## 2023-01-01 RX ADMIN — Medication 104 MILLIGRAM(S): at 11:20

## 2023-01-01 RX ADMIN — AMPICILLIN SODIUM AND SULBACTAM SODIUM 100 GRAM(S): 250; 125 INJECTION, POWDER, FOR SUSPENSION INTRAMUSCULAR; INTRAVENOUS at 23:00

## 2023-01-01 RX ADMIN — Medication 104 MILLIGRAM(S): at 11:50

## 2023-01-01 RX ADMIN — LISINOPRIL 10 MILLIGRAM(S): 2.5 TABLET ORAL at 05:22

## 2023-01-01 RX ADMIN — SODIUM CHLORIDE 75 MILLILITER(S): 9 INJECTION, SOLUTION INTRAVENOUS at 12:43

## 2023-01-01 RX ADMIN — TAMSULOSIN HYDROCHLORIDE 0.4 MILLIGRAM(S): 0.4 CAPSULE ORAL at 21:35

## 2023-01-01 RX ADMIN — ATORVASTATIN CALCIUM 40 MILLIGRAM(S): 80 TABLET, FILM COATED ORAL at 21:11

## 2023-01-01 RX ADMIN — Medication 104 MILLIGRAM(S): at 12:11

## 2023-01-01 RX ADMIN — DEXTROSE MONOHYDRATE, SODIUM CHLORIDE, AND POTASSIUM CHLORIDE 80 MILLILITER(S): 50; .745; 4.5 INJECTION, SOLUTION INTRAVENOUS at 13:56

## 2023-01-01 RX ADMIN — Medication 81 MILLIGRAM(S): at 13:29

## 2023-01-01 RX ADMIN — Medication 1 PATCH: at 06:22

## 2023-01-01 RX ADMIN — CHLORHEXIDINE GLUCONATE 1 APPLICATION(S): 213 SOLUTION TOPICAL at 05:24

## 2023-01-01 RX ADMIN — ATORVASTATIN CALCIUM 40 MILLIGRAM(S): 80 TABLET, FILM COATED ORAL at 22:23

## 2023-01-01 RX ADMIN — ENOXAPARIN SODIUM 40 MILLIGRAM(S): 100 INJECTION SUBCUTANEOUS at 15:20

## 2023-01-01 RX ADMIN — Medication 50 MILLIEQUIVALENT(S): at 12:16

## 2023-01-01 RX ADMIN — SODIUM CHLORIDE 100 MILLILITER(S): 9 INJECTION, SOLUTION INTRAVENOUS at 05:56

## 2023-01-01 RX ADMIN — Medication 1 PATCH: at 07:34

## 2023-01-01 RX ADMIN — Medication 1 PATCH: at 19:48

## 2023-01-01 RX ADMIN — Medication 104 MILLIGRAM(S): at 05:13

## 2023-01-01 RX ADMIN — SODIUM CHLORIDE 100 MILLILITER(S): 9 INJECTION, SOLUTION INTRAVENOUS at 18:18

## 2023-01-01 RX ADMIN — CLOPIDOGREL BISULFATE 75 MILLIGRAM(S): 75 TABLET, FILM COATED ORAL at 11:25

## 2023-01-01 RX ADMIN — SCOPALAMINE 1 PATCH: 1 PATCH, EXTENDED RELEASE TRANSDERMAL at 06:22

## 2023-01-01 RX ADMIN — ATORVASTATIN CALCIUM 40 MILLIGRAM(S): 80 TABLET, FILM COATED ORAL at 21:09

## 2023-01-01 RX ADMIN — SODIUM CHLORIDE 100 MILLILITER(S): 9 INJECTION, SOLUTION INTRAVENOUS at 05:13

## 2023-01-01 RX ADMIN — ENOXAPARIN SODIUM 40 MILLIGRAM(S): 100 INJECTION SUBCUTANEOUS at 15:11

## 2023-01-01 RX ADMIN — Medication 40 MILLIEQUIVALENT(S): at 12:35

## 2023-01-01 RX ADMIN — Medication 81 MILLIGRAM(S): at 11:10

## 2023-01-01 RX ADMIN — Medication 300 MILLIGRAM(S): at 11:22

## 2023-01-01 RX ADMIN — TAMSULOSIN HYDROCHLORIDE 0.4 MILLIGRAM(S): 0.4 CAPSULE ORAL at 21:13

## 2023-01-01 RX ADMIN — OLANZAPINE 2.5 MILLIGRAM(S): 15 TABLET, FILM COATED ORAL at 12:35

## 2023-01-01 RX ADMIN — Medication 300 MILLIGRAM(S): at 11:45

## 2023-01-01 RX ADMIN — Medication 300 MILLIGRAM(S): at 11:50

## 2023-01-01 RX ADMIN — SODIUM CHLORIDE 75 MILLILITER(S): 9 INJECTION, SOLUTION INTRAVENOUS at 19:08

## 2023-01-01 RX ADMIN — Medication 1 PATCH: at 19:03

## 2023-01-01 RX ADMIN — ATORVASTATIN CALCIUM 40 MILLIGRAM(S): 80 TABLET, FILM COATED ORAL at 21:06

## 2023-01-01 RX ADMIN — ENOXAPARIN SODIUM 40 MILLIGRAM(S): 100 INJECTION SUBCUTANEOUS at 14:07

## 2023-01-01 RX ADMIN — PANTOPRAZOLE SODIUM 40 MILLIGRAM(S): 20 TABLET, DELAYED RELEASE ORAL at 06:18

## 2023-01-01 RX ADMIN — ENOXAPARIN SODIUM 40 MILLIGRAM(S): 100 INJECTION SUBCUTANEOUS at 17:37

## 2023-01-01 RX ADMIN — SODIUM CHLORIDE 100 MILLILITER(S): 9 INJECTION, SOLUTION INTRAVENOUS at 05:14

## 2023-01-01 RX ADMIN — SODIUM CHLORIDE 75 MILLILITER(S): 9 INJECTION, SOLUTION INTRAVENOUS at 21:31

## 2023-01-01 RX ADMIN — Medication 10 MILLIGRAM(S): at 21:38

## 2023-01-01 RX ADMIN — Medication 104 MILLIGRAM(S): at 17:12

## 2023-01-01 RX ADMIN — SCOPALAMINE 1 PATCH: 1 PATCH, EXTENDED RELEASE TRANSDERMAL at 17:21

## 2023-01-01 RX ADMIN — SCOPALAMINE 1 PATCH: 1 PATCH, EXTENDED RELEASE TRANSDERMAL at 11:51

## 2023-01-01 RX ADMIN — LISINOPRIL 10 MILLIGRAM(S): 2.5 TABLET ORAL at 05:28

## 2023-01-01 RX ADMIN — PANTOPRAZOLE SODIUM 40 MILLIGRAM(S): 20 TABLET, DELAYED RELEASE ORAL at 05:52

## 2023-01-01 RX ADMIN — Medication 81 MILLIGRAM(S): at 12:44

## 2023-01-01 RX ADMIN — HALOPERIDOL DECANOATE 2 MILLIGRAM(S): 100 INJECTION INTRAMUSCULAR at 01:28

## 2023-01-01 RX ADMIN — Medication 1 PATCH: at 07:19

## 2023-01-01 RX ADMIN — SCOPALAMINE 1 PATCH: 1 PATCH, EXTENDED RELEASE TRANSDERMAL at 20:41

## 2023-01-01 RX ADMIN — CHLORHEXIDINE GLUCONATE 1 APPLICATION(S): 213 SOLUTION TOPICAL at 05:36

## 2023-01-01 RX ADMIN — Medication 104 MILLIGRAM(S): at 01:00

## 2023-01-01 RX ADMIN — OLANZAPINE 2.5 MILLIGRAM(S): 15 TABLET, FILM COATED ORAL at 22:22

## 2023-01-01 RX ADMIN — LISINOPRIL 10 MILLIGRAM(S): 2.5 TABLET ORAL at 05:52

## 2023-01-01 RX ADMIN — ENOXAPARIN SODIUM 40 MILLIGRAM(S): 100 INJECTION SUBCUTANEOUS at 16:39

## 2023-01-01 RX ADMIN — CHLORHEXIDINE GLUCONATE 1 APPLICATION(S): 213 SOLUTION TOPICAL at 11:35

## 2023-01-01 RX ADMIN — Medication 40 MILLIEQUIVALENT(S): at 12:19

## 2023-01-01 RX ADMIN — AMPICILLIN SODIUM AND SULBACTAM SODIUM 100 GRAM(S): 250; 125 INJECTION, POWDER, FOR SUSPENSION INTRAMUSCULAR; INTRAVENOUS at 05:33

## 2023-01-01 RX ADMIN — Medication 1 PATCH: at 06:39

## 2023-01-01 RX ADMIN — SCOPALAMINE 1 PATCH: 1 PATCH, EXTENDED RELEASE TRANSDERMAL at 20:46

## 2023-01-01 RX ADMIN — Medication 104 MILLIGRAM(S): at 12:27

## 2023-01-01 RX ADMIN — Medication 40 MILLIEQUIVALENT(S): at 12:47

## 2023-01-01 RX ADMIN — Medication 1 PATCH: at 19:25

## 2023-01-01 RX ADMIN — ENOXAPARIN SODIUM 40 MILLIGRAM(S): 100 INJECTION SUBCUTANEOUS at 05:06

## 2023-01-01 RX ADMIN — Medication 40 MILLIEQUIVALENT(S): at 16:14

## 2023-01-01 RX ADMIN — Medication 1 PATCH: at 06:34

## 2023-01-01 RX ADMIN — CLOPIDOGREL BISULFATE 75 MILLIGRAM(S): 75 TABLET, FILM COATED ORAL at 13:28

## 2023-01-01 RX ADMIN — Medication 25 GRAM(S): at 13:50

## 2023-01-01 RX ADMIN — ENOXAPARIN SODIUM 40 MILLIGRAM(S): 100 INJECTION SUBCUTANEOUS at 17:44

## 2023-01-01 RX ADMIN — ATORVASTATIN CALCIUM 40 MILLIGRAM(S): 80 TABLET, FILM COATED ORAL at 21:35

## 2023-01-01 RX ADMIN — LISINOPRIL 10 MILLIGRAM(S): 2.5 TABLET ORAL at 05:21

## 2023-01-01 RX ADMIN — CHLORHEXIDINE GLUCONATE 1 APPLICATION(S): 213 SOLUTION TOPICAL at 05:28

## 2023-01-01 RX ADMIN — Medication 1 PATCH: at 06:40

## 2023-01-01 RX ADMIN — CHLORHEXIDINE GLUCONATE 1 APPLICATION(S): 213 SOLUTION TOPICAL at 05:49

## 2023-01-01 RX ADMIN — LISINOPRIL 10 MILLIGRAM(S): 2.5 TABLET ORAL at 05:43

## 2023-01-01 RX ADMIN — Medication 104 MILLIGRAM(S): at 23:19

## 2023-01-01 RX ADMIN — Medication 300 MILLIGRAM(S): at 18:26

## 2023-01-01 RX ADMIN — Medication 81 MILLIGRAM(S): at 11:28

## 2023-01-01 RX ADMIN — TAMSULOSIN HYDROCHLORIDE 0.4 MILLIGRAM(S): 0.4 CAPSULE ORAL at 22:15

## 2023-01-01 RX ADMIN — SODIUM CHLORIDE 75 MILLILITER(S): 9 INJECTION, SOLUTION INTRAVENOUS at 02:11

## 2023-01-01 RX ADMIN — Medication 1 PATCH: at 07:14

## 2023-01-01 RX ADMIN — CLOPIDOGREL BISULFATE 75 MILLIGRAM(S): 75 TABLET, FILM COATED ORAL at 11:18

## 2023-01-01 RX ADMIN — Medication 10 MILLIGRAM(S): at 15:07

## 2023-01-01 RX ADMIN — DEXTROSE MONOHYDRATE, SODIUM CHLORIDE, AND POTASSIUM CHLORIDE 80 MILLILITER(S): 50; .745; 4.5 INJECTION, SOLUTION INTRAVENOUS at 00:55

## 2023-01-01 RX ADMIN — CHLORHEXIDINE GLUCONATE 1 APPLICATION(S): 213 SOLUTION TOPICAL at 06:09

## 2023-01-01 RX ADMIN — ATORVASTATIN CALCIUM 40 MILLIGRAM(S): 80 TABLET, FILM COATED ORAL at 22:14

## 2023-01-01 RX ADMIN — SCOPALAMINE 1 PATCH: 1 PATCH, EXTENDED RELEASE TRANSDERMAL at 08:17

## 2023-01-01 RX ADMIN — Medication 81 MILLIGRAM(S): at 11:24

## 2023-01-01 RX ADMIN — Medication 1 PATCH: at 08:11

## 2023-01-01 RX ADMIN — SCOPALAMINE 1 PATCH: 1 PATCH, EXTENDED RELEASE TRANSDERMAL at 19:50

## 2023-01-01 RX ADMIN — PANTOPRAZOLE SODIUM 40 MILLIGRAM(S): 20 TABLET, DELAYED RELEASE ORAL at 05:21

## 2023-01-01 RX ADMIN — SCOPALAMINE 1 PATCH: 1 PATCH, EXTENDED RELEASE TRANSDERMAL at 08:57

## 2023-01-01 RX ADMIN — Medication 81 MILLIGRAM(S): at 12:26

## 2023-01-01 RX ADMIN — SCOPALAMINE 1 PATCH: 1 PATCH, EXTENDED RELEASE TRANSDERMAL at 09:01

## 2023-01-01 RX ADMIN — SODIUM CHLORIDE 75 MILLILITER(S): 9 INJECTION, SOLUTION INTRAVENOUS at 06:19

## 2023-01-01 RX ADMIN — LISINOPRIL 5 MILLIGRAM(S): 2.5 TABLET ORAL at 05:33

## 2023-01-01 RX ADMIN — PANTOPRAZOLE SODIUM 40 MILLIGRAM(S): 20 TABLET, DELAYED RELEASE ORAL at 05:28

## 2023-01-01 RX ADMIN — ENOXAPARIN SODIUM 40 MILLIGRAM(S): 100 INJECTION SUBCUTANEOUS at 16:22

## 2023-01-01 RX ADMIN — Medication 20 MILLIEQUIVALENT(S): at 22:31

## 2023-01-01 RX ADMIN — CLOPIDOGREL BISULFATE 75 MILLIGRAM(S): 75 TABLET, FILM COATED ORAL at 11:29

## 2023-01-01 RX ADMIN — LISINOPRIL 10 MILLIGRAM(S): 2.5 TABLET ORAL at 06:07

## 2023-01-01 RX ADMIN — Medication 1 PATCH: at 19:49

## 2023-01-01 RX ADMIN — SCOPALAMINE 1 PATCH: 1 PATCH, EXTENDED RELEASE TRANSDERMAL at 09:00

## 2023-01-01 RX ADMIN — Medication 10 MILLIGRAM(S): at 16:01

## 2023-01-01 RX ADMIN — Medication 1 PATCH: at 19:15

## 2023-01-01 RX ADMIN — PANTOPRAZOLE SODIUM 40 MILLIGRAM(S): 20 TABLET, DELAYED RELEASE ORAL at 06:47

## 2023-01-01 RX ADMIN — ATORVASTATIN CALCIUM 40 MILLIGRAM(S): 80 TABLET, FILM COATED ORAL at 21:15

## 2023-01-01 RX ADMIN — CHLORHEXIDINE GLUCONATE 1 APPLICATION(S): 213 SOLUTION TOPICAL at 05:40

## 2023-01-01 RX ADMIN — Medication 1 PATCH: at 08:03

## 2023-01-01 RX ADMIN — LISINOPRIL 10 MILLIGRAM(S): 2.5 TABLET ORAL at 06:25

## 2023-01-01 RX ADMIN — Medication 104 MILLIGRAM(S): at 17:23

## 2023-01-01 RX ADMIN — Medication 104 MILLIGRAM(S): at 23:36

## 2023-01-01 RX ADMIN — Medication 1 PATCH: at 17:03

## 2023-01-01 RX ADMIN — HALOPERIDOL DECANOATE 1 MILLIGRAM(S): 100 INJECTION INTRAMUSCULAR at 05:03

## 2023-01-01 RX ADMIN — Medication 1 PATCH: at 11:13

## 2023-01-01 RX ADMIN — Medication 104 MILLIGRAM(S): at 05:55

## 2023-01-01 RX ADMIN — SCOPALAMINE 1 PATCH: 1 PATCH, EXTENDED RELEASE TRANSDERMAL at 17:04

## 2023-01-01 RX ADMIN — CHLORHEXIDINE GLUCONATE 1 APPLICATION(S): 213 SOLUTION TOPICAL at 05:44

## 2023-01-01 RX ADMIN — TAMSULOSIN HYDROCHLORIDE 0.4 MILLIGRAM(S): 0.4 CAPSULE ORAL at 21:38

## 2023-01-01 RX ADMIN — SCOPALAMINE 1 PATCH: 1 PATCH, EXTENDED RELEASE TRANSDERMAL at 09:52

## 2023-01-01 RX ADMIN — ATORVASTATIN CALCIUM 40 MILLIGRAM(S): 80 TABLET, FILM COATED ORAL at 21:55

## 2023-01-01 RX ADMIN — Medication 10 MILLIGRAM(S): at 12:39

## 2023-01-01 RX ADMIN — PANTOPRAZOLE SODIUM 40 MILLIGRAM(S): 20 TABLET, DELAYED RELEASE ORAL at 05:22

## 2023-01-01 RX ADMIN — Medication 300 MILLIGRAM(S): at 11:39

## 2023-01-01 RX ADMIN — ATORVASTATIN CALCIUM 40 MILLIGRAM(S): 80 TABLET, FILM COATED ORAL at 21:31

## 2023-01-01 RX ADMIN — Medication 5 MILLIGRAM(S): at 04:52

## 2023-01-01 RX ADMIN — TAMSULOSIN HYDROCHLORIDE 0.4 MILLIGRAM(S): 0.4 CAPSULE ORAL at 22:31

## 2023-01-01 RX ADMIN — ENOXAPARIN SODIUM 40 MILLIGRAM(S): 100 INJECTION SUBCUTANEOUS at 15:07

## 2023-01-01 RX ADMIN — SODIUM CHLORIDE 75 MILLILITER(S): 9 INJECTION INTRAMUSCULAR; INTRAVENOUS; SUBCUTANEOUS at 17:18

## 2023-01-01 RX ADMIN — CHLORHEXIDINE GLUCONATE 1 APPLICATION(S): 213 SOLUTION TOPICAL at 05:27

## 2023-01-01 RX ADMIN — Medication 81 MILLIGRAM(S): at 11:18

## 2023-01-01 RX ADMIN — Medication 25 GRAM(S): at 21:11

## 2023-01-01 RX ADMIN — Medication 81 MILLIGRAM(S): at 11:50

## 2023-01-01 RX ADMIN — Medication 400 MILLIGRAM(S): at 07:13

## 2023-01-01 RX ADMIN — Medication 1 PATCH: at 12:57

## 2023-01-01 RX ADMIN — Medication 1 PATCH: at 22:31

## 2023-01-01 RX ADMIN — CLOPIDOGREL BISULFATE 75 MILLIGRAM(S): 75 TABLET, FILM COATED ORAL at 11:12

## 2023-01-01 RX ADMIN — OLANZAPINE 2.5 MILLIGRAM(S): 15 TABLET, FILM COATED ORAL at 21:10

## 2023-01-01 RX ADMIN — Medication 81 MILLIGRAM(S): at 11:21

## 2023-01-01 RX ADMIN — Medication 50 MILLIEQUIVALENT(S): at 10:25

## 2023-01-01 RX ADMIN — Medication 104 MILLIGRAM(S): at 17:37

## 2023-01-01 RX ADMIN — Medication 104 MILLIGRAM(S): at 18:18

## 2023-01-01 RX ADMIN — CLOPIDOGREL BISULFATE 75 MILLIGRAM(S): 75 TABLET, FILM COATED ORAL at 11:50

## 2023-01-01 RX ADMIN — Medication 1 PATCH: at 19:36

## 2023-01-01 RX ADMIN — Medication 81 MILLIGRAM(S): at 13:11

## 2023-01-01 RX ADMIN — SODIUM CHLORIDE 75 MILLILITER(S): 9 INJECTION, SOLUTION INTRAVENOUS at 11:31

## 2023-01-01 RX ADMIN — SCOPALAMINE 1 PATCH: 1 PATCH, EXTENDED RELEASE TRANSDERMAL at 08:12

## 2023-01-01 RX ADMIN — OLANZAPINE 2.5 MILLIGRAM(S): 15 TABLET, FILM COATED ORAL at 21:47

## 2023-01-01 RX ADMIN — SODIUM CHLORIDE 75 MILLILITER(S): 9 INJECTION, SOLUTION INTRAVENOUS at 09:07

## 2023-01-01 RX ADMIN — ATORVASTATIN CALCIUM 40 MILLIGRAM(S): 80 TABLET, FILM COATED ORAL at 21:21

## 2023-01-01 RX ADMIN — Medication 104 MILLIGRAM(S): at 05:41

## 2023-01-01 RX ADMIN — HALOPERIDOL DECANOATE 2 MILLIGRAM(S): 100 INJECTION INTRAMUSCULAR at 05:45

## 2023-01-01 RX ADMIN — TAMSULOSIN HYDROCHLORIDE 0.4 MILLIGRAM(S): 0.4 CAPSULE ORAL at 21:15

## 2023-01-01 RX ADMIN — CLOPIDOGREL BISULFATE 75 MILLIGRAM(S): 75 TABLET, FILM COATED ORAL at 13:19

## 2023-01-01 RX ADMIN — CLOPIDOGREL BISULFATE 75 MILLIGRAM(S): 75 TABLET, FILM COATED ORAL at 13:11

## 2023-01-01 RX ADMIN — SODIUM CHLORIDE 75 MILLILITER(S): 9 INJECTION INTRAMUSCULAR; INTRAVENOUS; SUBCUTANEOUS at 05:43

## 2023-01-01 RX ADMIN — Medication 40 MILLIEQUIVALENT(S): at 17:03

## 2023-01-01 RX ADMIN — Medication 1 PATCH: at 13:11

## 2023-01-01 RX ADMIN — Medication 1 PATCH: at 21:15

## 2023-01-01 RX ADMIN — ENOXAPARIN SODIUM 40 MILLIGRAM(S): 100 INJECTION SUBCUTANEOUS at 15:59

## 2023-01-01 RX ADMIN — Medication 1 PATCH: at 20:46

## 2023-01-01 RX ADMIN — LISINOPRIL 10 MILLIGRAM(S): 2.5 TABLET ORAL at 05:36

## 2023-01-01 RX ADMIN — TAMSULOSIN HYDROCHLORIDE 0.4 MILLIGRAM(S): 0.4 CAPSULE ORAL at 22:23

## 2023-01-01 RX ADMIN — Medication 81 MILLIGRAM(S): at 12:35

## 2023-01-01 RX ADMIN — TAMSULOSIN HYDROCHLORIDE 0.4 MILLIGRAM(S): 0.4 CAPSULE ORAL at 21:16

## 2023-01-01 RX ADMIN — TAMSULOSIN HYDROCHLORIDE 0.4 MILLIGRAM(S): 0.4 CAPSULE ORAL at 21:24

## 2023-01-01 RX ADMIN — ENOXAPARIN SODIUM 40 MILLIGRAM(S): 100 INJECTION SUBCUTANEOUS at 16:16

## 2023-01-01 RX ADMIN — SODIUM CHLORIDE 75 MILLILITER(S): 9 INJECTION, SOLUTION INTRAVENOUS at 21:57

## 2023-01-01 RX ADMIN — CHLORHEXIDINE GLUCONATE 1 APPLICATION(S): 213 SOLUTION TOPICAL at 06:18

## 2023-01-01 RX ADMIN — LISINOPRIL 10 MILLIGRAM(S): 2.5 TABLET ORAL at 05:47

## 2023-01-01 RX ADMIN — ENOXAPARIN SODIUM 40 MILLIGRAM(S): 100 INJECTION SUBCUTANEOUS at 18:18

## 2023-01-01 RX ADMIN — Medication 81 MILLIGRAM(S): at 13:13

## 2023-01-01 RX ADMIN — ENOXAPARIN SODIUM 40 MILLIGRAM(S): 100 INJECTION SUBCUTANEOUS at 15:14

## 2023-01-01 RX ADMIN — SCOPALAMINE 1 PATCH: 1 PATCH, EXTENDED RELEASE TRANSDERMAL at 19:36

## 2023-01-01 RX ADMIN — SODIUM CHLORIDE 100 MILLILITER(S): 9 INJECTION INTRAMUSCULAR; INTRAVENOUS; SUBCUTANEOUS at 22:25

## 2023-01-01 RX ADMIN — Medication 104 MILLIGRAM(S): at 13:42

## 2023-01-01 RX ADMIN — TAMSULOSIN HYDROCHLORIDE 0.4 MILLIGRAM(S): 0.4 CAPSULE ORAL at 21:47

## 2023-01-01 RX ADMIN — TAMSULOSIN HYDROCHLORIDE 0.4 MILLIGRAM(S): 0.4 CAPSULE ORAL at 21:07

## 2023-01-01 RX ADMIN — LISINOPRIL 10 MILLIGRAM(S): 2.5 TABLET ORAL at 06:18

## 2023-01-01 RX ADMIN — SCOPALAMINE 1 PATCH: 1 PATCH, EXTENDED RELEASE TRANSDERMAL at 07:19

## 2023-01-01 RX ADMIN — CLOPIDOGREL BISULFATE 75 MILLIGRAM(S): 75 TABLET, FILM COATED ORAL at 11:21

## 2023-01-01 RX ADMIN — Medication 1000 MILLIGRAM(S): at 07:30

## 2023-01-01 RX ADMIN — AMPICILLIN SODIUM AND SULBACTAM SODIUM 200 GRAM(S): 250; 125 INJECTION, POWDER, FOR SUSPENSION INTRAMUSCULAR; INTRAVENOUS at 09:25

## 2023-01-01 RX ADMIN — SCOPALAMINE 1 PATCH: 1 PATCH, EXTENDED RELEASE TRANSDERMAL at 19:11

## 2023-01-01 RX ADMIN — CLOPIDOGREL BISULFATE 75 MILLIGRAM(S): 75 TABLET, FILM COATED ORAL at 12:26

## 2023-01-01 RX ADMIN — ATORVASTATIN CALCIUM 40 MILLIGRAM(S): 80 TABLET, FILM COATED ORAL at 21:16

## 2023-01-01 RX ADMIN — Medication 1 PATCH: at 12:47

## 2023-01-01 RX ADMIN — TAMSULOSIN HYDROCHLORIDE 0.4 MILLIGRAM(S): 0.4 CAPSULE ORAL at 21:31

## 2023-01-01 RX ADMIN — CHLORHEXIDINE GLUCONATE 1 APPLICATION(S): 213 SOLUTION TOPICAL at 05:52

## 2023-01-01 RX ADMIN — ATORVASTATIN CALCIUM 40 MILLIGRAM(S): 80 TABLET, FILM COATED ORAL at 21:38

## 2023-01-01 RX ADMIN — AMPICILLIN SODIUM AND SULBACTAM SODIUM 100 GRAM(S): 250; 125 INJECTION, POWDER, FOR SUSPENSION INTRAMUSCULAR; INTRAVENOUS at 12:02

## 2023-01-01 RX ADMIN — Medication 2 MILLIGRAM(S): at 21:09

## 2023-01-01 RX ADMIN — SODIUM CHLORIDE 75 MILLILITER(S): 9 INJECTION, SOLUTION INTRAVENOUS at 15:51

## 2023-01-01 RX ADMIN — ENOXAPARIN SODIUM 40 MILLIGRAM(S): 100 INJECTION SUBCUTANEOUS at 15:52

## 2023-01-01 RX ADMIN — Medication 1 PATCH: at 20:41

## 2023-01-01 RX ADMIN — SCOPALAMINE 1 PATCH: 1 PATCH, EXTENDED RELEASE TRANSDERMAL at 08:49

## 2023-01-01 RX ADMIN — TAMSULOSIN HYDROCHLORIDE 0.4 MILLIGRAM(S): 0.4 CAPSULE ORAL at 21:55

## 2023-01-01 RX ADMIN — Medication 1 PATCH: at 07:48

## 2023-01-01 RX ADMIN — Medication 1 MILLIGRAM(S): at 18:34

## 2023-01-01 RX ADMIN — SCOPALAMINE 1 PATCH: 1 PATCH, EXTENDED RELEASE TRANSDERMAL at 08:01

## 2023-01-01 RX ADMIN — Medication 1 PATCH: at 07:03

## 2023-01-01 RX ADMIN — TAMSULOSIN HYDROCHLORIDE 0.4 MILLIGRAM(S): 0.4 CAPSULE ORAL at 21:11

## 2023-01-01 RX ADMIN — ENOXAPARIN SODIUM 40 MILLIGRAM(S): 100 INJECTION SUBCUTANEOUS at 17:19

## 2023-01-01 RX ADMIN — CLOPIDOGREL BISULFATE 75 MILLIGRAM(S): 75 TABLET, FILM COATED ORAL at 12:44

## 2023-01-01 RX ADMIN — SCOPALAMINE 1 PATCH: 1 PATCH, EXTENDED RELEASE TRANSDERMAL at 07:34

## 2023-01-01 RX ADMIN — SCOPALAMINE 1 PATCH: 1 PATCH, EXTENDED RELEASE TRANSDERMAL at 21:14

## 2023-01-01 RX ADMIN — LISINOPRIL 5 MILLIGRAM(S): 2.5 TABLET ORAL at 05:42

## 2023-01-01 RX ADMIN — CHLORHEXIDINE GLUCONATE 1 APPLICATION(S): 213 SOLUTION TOPICAL at 05:21

## 2023-01-01 RX ADMIN — Medication 1 PATCH: at 08:01

## 2023-10-16 PROBLEM — I10 ESSENTIAL (PRIMARY) HYPERTENSION: Chronic | Status: ACTIVE | Noted: 2018-06-28

## 2023-10-16 NOTE — H&P ADULT - NSHPPHYSICALEXAM_GEN_ALL_CORE
VITALS:   T(C): 37 (10-16-23 @ 11:11), Max: 37 (10-16-23 @ 11:11)  HR: 82 (10-16-23 @ 11:11) (82 - 82)  BP: 203/94 (10-16-23 @ 11:11) (203/94 - 203/94)  RR: 16 (10-16-23 @ 11:11) (16 - 16)  SpO2: 98% (10-16-23 @ 11:11) (98% - 98%)    GENERAL: NAD, lying in bed comfortably  HEAD:  Atraumatic, Normocephalic  EYES: EOMI, PERRLA, conjunctiva and sclera clear  ENT: Moist mucous membranes  NECK: Supple, No JVD  CHEST/LUNG: Clear to auscultation bilaterally; No rales, rhonchi, wheezing, or rubs. Unlabored respirations  HEART: Regular rate and rhythm; No murmurs, rubs, or gallops  ABDOMEN: BSx4; Soft, nontender, nondistended  EXTREMITIES:  2+ Peripheral Pulses, brisk capillary refill. No clubbing, cyanosis, or edema  NERVOUS SYSTEM:  A&Ox3, no focal deficits , follows commands, 5/5 UE and LE strength, slurred speech  SKIN: No rashes or lesions

## 2023-10-16 NOTE — H&P ADULT - ATTENDING COMMENTS
73-year-old male with no significant  PMH is brought in by niece for concerns of increasing weakness, agitation and inability to take care of himself. admitted to medicine for further work up. unkown baseline line functional status but suspect has been progressive decline of cognitive abilities and suspected underlying dementia. most likely will need placement.     Radhaece is Physiatrist: [phone], visiting from [place]   Brother Isaak [phone]     ROS and physical exam as above.  Assessment and plan as above.  I edited the note.  Discussed with leo at bedside in ER.

## 2023-10-16 NOTE — CHART NOTE - NSCHARTNOTEFT_GEN_A_CORE
Patient is in tompkins bed in the ED. Is agitated and combative with nursing staff. Niece bedside (she is a physician). Will give haldol now. Start on seroquel qhs.

## 2023-10-16 NOTE — H&P ADULT - HISTORY OF PRESENT ILLNESS
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 Ramsey also states his living environment is not safe for him at home and patient endorses being unable to walk and take care of himself. At time of encounter patient has grossly normal UE and LE strength, his speech is slurred, and he is slightly confused to questioning. Exam is otherwise normal, neuro-imaging was done which revealed no acute process in addition to age-indeterminate ischemic changes. The patient is admitted to medicine for weakness, confusion, and inability to care for self.

## 2023-10-16 NOTE — ED PROVIDER NOTE - OBJECTIVE STATEMENT
73-year-old male with unknown PMH brought in by niece for concerns of increasing weakness, agitation and inability to take care of himself 73-year-old male with unknown PMH brought in by niece for concerns of increasing weakness, agitation and inability to take care of himself. Appearing niece she has not seen him and many years until the beginning of this month; states that previously he was a well-appearing healthy man and on this visit noticed that he is seemingly not taking care of himself and has been agitated and yelling often but not physically aggressive with anyone. Patient currently lives with his older sister who niece notes is a poor historian, and does not seem to be taking 73-year-old male with unknown PMH brought in by niece for concerns of increasing weakness, agitation and inability to take care of himself. Appearing niece she has not seen him and many years until the beginning of this month; states that previously he was a well-appearing healthy man and on this visit noticed that he is seemingly not taking care of himself and has been agitated and yelling often but not physically aggressive with anyone. Patient currently lives with his older sister who niece notes is a poor historian, and does not seem to be taking care of herself well either; sister does not go to doctors so niece believes pt has not been seen by any provider in years. Patient complains of feeling "lousy" because he is in the hospital and has to pee  but denies pain 73-year-old male with unknown PMH brought in by leo for concerns of increasing weakness, agitation and inability to take care of himself. Appearing niece she has not seen him and many years until the beginning of this month; states that previously he was a well-appearing healthy man and on this visit noticed that he is seemingly not taking care of himself and has been agitated and yelling often but not physically aggressive with anyone. Patient currently lives with his older sister who niece notes is a poor historian, and does not seem to be taking care of herself well either; sister does not go to doctors so niece believes pt has not been seen by any provider in years. [facility] but [facility] do not have the resources to get him there. Patient complains of feeling "lousy" because he is in the hospital and has to pee  but denies pain anywhere, dysuria, SOB, headache, dizziness.

## 2023-10-16 NOTE — ED ADULT NURSE NOTE - NSFALLUNIVINTERV_ED_ALL_ED
Bed/Stretcher in lowest position, wheels locked, appropriate side rails in place/Call bell, personal items and telephone in reach/Instruct patient to call for assistance before getting out of bed/chair/stretcher/Non-slip footwear applied when patient is off stretcher/[place] to call system/Physically safe environment - no spills, clutter or unnecessary equipment/Purposeful proactive rounding/Room/bathroom lighting operational, light cord in reach

## 2023-10-16 NOTE — ED PROVIDER NOTE - PHYSICAL EXAMINATION
CONSTITUTIONAL: Well-developed; in no acute distress. Disheveled.   SKIN: Warm, dry  HEAD: Normocephalic; atraumatic  EYES: PERRL, EOMI, normal sclera and conjunctiva. No pallor.  ENT: No nasal discharge; airway clear. MMM  CARD:  Regular rate and rhythm. Normal S1, S2. 2+ radial pulses. No LE edema.  RESP: No increased WOB. CTA b/l without wheezes, crackles, rhonchi  ABD: Soft, nontender, nondistended.  EXT: Normal ROM.   NEURO: Alert, oriented, grossly unremarkable  PSYCH: Cooperative, appropriate.

## 2023-10-16 NOTE — H&P ADULT - NSHPLABSRESULTS_GEN_ALL_CORE
< from: CT Head No Cont (10.16.23 @ 12:30) >    FINDINGS:    The third and lateral ventricles are mildly enlarged as are the cortical   sulci consistent witha mild degree of cortical atrophy.  The fourth   ventricle is normal in size and position.    There is no shift of the midline structures, evidence of acute   intracranial hemorrhage, or depressed skull fracture.    Patchy foci of diminished attenuation in the periventricular white   matter, subcortical white matter, and basal ganglia likely representing   ischemic change, age indeterminant.    IMPRESSION:    1. Mild atrophic changes.    2. Foci of diminished attenuation in the periventricular white matter,   subcortical white matter, and basal ganglia likely representing ischemic   change, age indeterminant.    --- End of Report ---    < end of copied text >

## 2023-10-16 NOTE — H&P ADULT - ASSESSMENT
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 Ramsey also states his living environment is not safe for him at home and patient endorses being unable to walk and take care of himself. At time of encounter patient has grossly normal UE and LE strength, his speech is slurred, and he is slightly confused to questioning. Exam is otherwise normal, neuro-imaging was done which revealed no acute process in addition to age-indeterminate ischemic changes. The patient is admitted to medicine for weakness, confusion, and inability to care for self.     #Confusion with weakness and inability to ambulate  - Brought in to ER by his neice who says his living environment at home is unsafe and mental status has acutely deteroirated to the point of patient being unable to care for himself  - Given that patient is  of the [place], nechelsie would like SW to be involved and consider [facility] [place]  - Will check TSH, B12, RPR in the AM f/u levels  - F/u SW and PT recs  - Check basic labs  - Nechelsie wonder's if patient has underlying PTSD (she also works in healthcare), although he denies insomnia, anxiety, or PTSD symptoms. May consider psych consult if he develops the above symptoms    #Age-indeterminate ischemic changes on CT head  - May be contributing to change in mental status  - F/u BP control, outpatient f/u    #MISC  - Diet: dash tlc  - GI ppx: not needed  - DVT ppx: Lovenox SQ daily  - Activity: ambulate AT, pending PT  - Dispo: may need placement to facility as nechelsie states home environment isn't safe for him   The patient is a 73-year-old male with no PMH not on any home meds and history of service in [place], lives at home with his sister and is brought in by niece for concerns of increasing weakness, agitation and inability to take care of himself. Ramsey also states his living environment is not safe for him at home and patient endorses being unable to walk and take care of himself. At time of encounter patient has grossly normal UE and LE strength, his speech is slurred, and he is slightly confused to questioning. Exam is otherwise normal, neuro-imaging was done which revealed no acute process in addition to age-indeterminate ischemic changes. The patient is admitted to medicine for weakness, confusion, and inability to care for self.     # Confusion with weakness and inability to ambulate:   # underlying dementia?  - Brought in to ER by his neice who says his living environment at home is unsafe and mental status has acutely deteriorated to the point of patient being unable to care for himself.   - Given that patient is  of [place], dacia would like SW to be involved and consider [facility] [place]  - Will check TSH, B12, RPR in the AM f/u levels  - F/u SW and PT recs  - Check basic labs  - Dacia wonder's if patient has underlying PTSD (she also works in healthcare), although he denies insomnia, anxiety, or PTSD symptoms. May consider psych consult if he develops the above symptoms    #Age-indeterminate ischemic changes on CT head  - May be contributing to change in mental status  - F/u BP control, outpatient f/u    # Hypokalemia: replete and repeat    # Isolated elevated ALP: follow up outpatient     #MISC  - Diet: dash tlc  - GI ppx: not needed  - DVT ppx: Lovenox SQ daily  - Activity: ambulate AT, pending PT  - Dispo: may need placement to facility as dacia states home environment isn't safe for him

## 2023-10-16 NOTE — ED PROVIDER NOTE - CLINICAL SUMMARY MEDICAL DECISION MAKING FREE TEXT BOX
Patient brought in by family who states that the patient is unsafe living at home with deteriorated over the past few months and is unable to care for himself requests admission with [facility].

## 2023-10-17 NOTE — CHART NOTE - NSCHARTNOTEFT_GEN_A_CORE
Provider to RN 1430: Patient is hypertensive  Notified by nurse that patient is hypertensive with BP in the 190s.  Patient had no complaints and was otherwise asymptomatic, HR was in the 90s.   Patient given labetalol 10mg push.  Will continue to monitor.

## 2023-10-17 NOTE — PATIENT PROFILE ADULT - NSPROPTRIGHTNOTIFY_GEN_A_NUR
[facility] - [facility]  Hospitalist Progress Note     [facility] Patient Status:  Inpatient    1941  66year old CSN [id number]   information could not be obtained

## 2023-10-17 NOTE — PROGRESS NOTE ADULT - ASSESSMENT
Patient is a 74y/o male with no PMH brought in by niece for concerns of increasing weakness, agitation and inability to take care of himself.     # Confusion with weakness and inability to ambulate:   # underlying dementia?  - Brought in to ER by his niece who says his living environment at home is unsafe and mental status has acutely deteriorated to the point of patient being unable to care for himself.   - Given that patient is  of [place], niece would like SW to be involved and consider [facility] [place]  - Will check TSH, B12, RPR in the AM f/u levels  - F/u SW and PT recs  - Check basic labs  - Niece wonder's if patient has underlying PTSD (she also works in healthcare), although he denies insomnia, anxiety, or PTSD symptoms. May consider psych consult if he develops the above symptoms  10/17  - check lipid profile and A1C  - Psychiatry consulted  - Haldol 1mg Q8 prn    #Hypertension  - start linionpril 5    #Age-indeterminate ischemic changes on CT head  - May be contributing to change in mental status  - F/u BP control, outpatient f/u    # Hypokalemia: replete and repeat    # Isolated elevated ALP: follow up outpatient     #MISC  - Diet: dash tlc  - GI ppx: not needed  - DVT ppx: Lovenox SQ daily  - Activity: ambulate AT, pending PT  - Dispo: DC after psych evaluation and off restraints.     Patient is a 72y/o male with no PMH brought in by niece for concerns of increasing weakness, agitation and inability to take care of himself.     # Confusion with weakness and inability to ambulate:   # underlying dementia?  - Brought in to ER by his niece who says his living environment at home is unsafe and mental status has acutely deteriorated to the point of patient being unable to care for himself.   - Given that patient is  of [place], niece would like SW to be involved and consider [facility] [place]  - Will check TSH, B12, RPR in the AM f/u levels  - F/u SW and PT recs  - Check basic labs  - Niece wonder's if patient has underlying PTSD (she also works in healthcare), although he denies insomnia, anxiety, or PTSD symptoms. May consider psych consult if he develops the above symptoms  10/17  - check lipid profile and A1C  - Psychiatry consulted  - Haldol 1mg Q8 prn    #Hypertension  - start linionpril 5  - hypertensive episode today likely 2/2 urinary retention s/p 10mg labetalol  - clonidine patch if patient continues to be hypertensive    #Urinary retention  #BPH  - blood clots at penile head likely due to traumatic straight catheterization  - Urology consulted  - following BUN/Cr  - bladder scan  - Start flomax.  - PSA levels     #Age-indeterminate ischemic changes on CT head  - May be contributing to change in mental status  - F/u BP control, outpatient f/u    # Hypokalemia: replete and repeat    # Isolated elevated ALP: follow up outpatient     #MISC  - Diet: dash tlc  - GI ppx: not needed  - DVT ppx: Lovenox SQ daily  - Activity: ambulate AT, pending PT  - Dispo: DC after psych evaluation and off restraints.

## 2023-10-17 NOTE — PROGRESS NOTE ADULT - SUBJECTIVE AND OBJECTIVE BOX
Patient is a 74y/o male with no PMH brought in by niece for concerns of increasing weakness, agitation and inability to take care of himself.   Overnight, patient was agitated and was given haldol and put on Seroquel  He was also hypertensive and given a dose of labetalol.  After a straight cath pt has red blood on the tip of the penis, has difficulty urinating in supine position, nonverbal, demented,  family at the bedside.      PAST MEDICAL & SURGICAL HISTORY  Hypertension, unspecified type    No significant past surgical history      SOCIAL HISTORY:  Social History:      ALLERGIES:  No Known Allergies      VITALS:   T(C): 36.7 (16 Oct 2023 22:37), Max: 36.7 (16 Oct 2023 17:05)  T(F): 98 (16 Oct 2023 22:37), Max: 98.1 (16 Oct 2023 17:05)  HR: 78 (17 Oct 2023 07:32) (78 - 93)  BP: 163/96 (17 Oct 2023 07:32) (158/105 - 204/98)  BP(mean): --  RR: 18 (17 Oct 2023 05:54) (17 - 19)  SpO2: 98% (17 Oct 2023 00:03) (98% - 99%)        PHYSICAL EXAM:  GENERAL: NAD, minimally verbal, demented, does not follow commands   HEAD: NCAD  NECK: Supple, no JVD   HEART: Regular rate and rhythm, normal S1/S2,   LUNGS:  decreased BS at bases   ABDOMEN:  soft, non-tender, non-distended, BS present   Genitals: red blood noted on the  tip of the penis   EXTREMITIES: no rashes, extremities warm/dry, no cyanosis, no edema, ulcerations or ecchymosis  SKIN: No new rashes or lesions   Neuro: Awake, demented, minimally verbal, does not follow commands, moving all extremities         LABS:                                   13.4   9.46  )-----------( 276      ( 17 Oct 2023 06:16 )             40.6   10-17    140  |  100  |  10  ----------------------------<  109<H>  4.0   |  29  |  0.9    Ca    9.7      17 Oct 2023 06:16    TPro  6.4  /  Alb  4.1  /  TBili  0.4  /  DBili  x   /  AST  13  /  ALT  5   /  AlkPhos  118<H>  10-16    Urinalysis Basic - ( 16 Oct 2023 14:11 )    Color: Yellow / Appearance: Clear / S.009 / pH: x  Gluc: x / Ketone: Negative mg/dL  / Bili: Negative / Urobili: 0.2 mg/dL   Blood: x / Protein: Negative mg/dL / Nitrite: Negative   Leuk Esterase: Trace / RBC: 1 /HPF / WBC 6 /HPF   Sq Epi: x / Non Sq Epi: 0 /HPF / Bacteria: Negative /HPF    RADIOLOGY:    < from: CT Head No Cont (10.16.23 @ 12:30) >  IMPRESSION:    1. Mild atrophic changes.    2. Foci of diminished attenuation in the periventricular white matter,   subcortical white matter, and basal ganglia likely representing ischemic   change, age indeterminant.      MEDICATIONS  (STANDING):  enoxaparin Injectable 40 milliGRAM(s) SubCutaneous every 24 hours  lactated ringers. 1000 milliLiter(s) (50 mL/Hr) IV Continuous <Continuous>  lisinopril 5 milliGRAM(s) Oral daily  potassium chloride    Tablet ER 20 milliEquivalent(s) Oral once  sodium chloride 0.9%. 1000 milliLiter(s) (100 mL/Hr) IV Continuous <Continuous>  tamsulosin 0.4 milliGRAM(s) Oral at bedtime    MEDICATIONS  (PRN):  haloperidol    Injectable 1 milliGRAM(s) IntraMuscular every 8 hours PRN Agitation  QUEtiapine 25 milliGRAM(s) Oral at bedtime PRN agitation

## 2023-10-17 NOTE — SWALLOW BEDSIDE ASSESSMENT ADULT - COMMENTS
Pt presents w/ no overt s/s of aspiration/penetration while consuming easy to chew w/ mildly thick liquids. No further trials 2/2 generalized weakness.

## 2023-10-17 NOTE — PATIENT PROFILE ADULT - FALL HARM RISK - HARM RISK INTERVENTIONS
Assistance with ambulation/Assistance OOB with selected safe patient handling equipment/Communicate Risk of Fall with Harm to all staff/Discuss with provider need for PT consult/Monitor for mental status changes/Monitor gait and stability/Move patient closer to nurses' station/Orthostatic vital signs/Reinforce activity limits and safety measures with patient and family/Reorient to person, place and time as needed/Tailored Fall Risk Interventions/Toileting schedule using arm’s reach rule for commode and bathroom/Use of alarms - bed, chair and/or voice tab/Visual Cue: Yellow wristband and red socks/Bed in lowest position, wheels locked, appropriate side rails in place/Call bell, personal items and telephone in reach/Instruct patient to call for assistance before getting out of bed or chair/Non-slip footwear when patient is out of bed/[place] to call system/Physically safe environment - no spills, clutter or unnecessary equipment/Purposeful Proactive Rounding/Room/bathroom lighting operational, light cord in reach

## 2023-10-17 NOTE — CONSULT NOTE ADULT - SUBJECTIVE AND OBJECTIVE BOX
Pt is a 72yo Male with no significant PMH who presented to the hospital and was admitted with increasing weakness and agitation.    UROLOGY consulted for urinary retention & blood at tip of meatus. Of note, pt was straight cathed earlier without reported difficulty. Pt was later noted to be in retention and RN met resistance w/ straight cath, blood noted to be at meatus so procedure aborted. Unable to obtain urologic history from patient due to mental status. No family present at bedside, no urology notes found in chart.     PAST MEDICAL & SURGICAL HISTORY:  Hypertension, unspecified type  No significant past surgical history    MEDICATIONS  (STANDING):  enoxaparin Injectable 40 milliGRAM(s) SubCutaneous every 24 hours  lactated ringers. 1000 milliLiter(s) (50 mL/Hr) IV Continuous <Continuous>  lisinopril 5 milliGRAM(s) Oral daily  potassium chloride    Tablet ER 20 milliEquivalent(s) Oral once  sodium chloride 0.9%. 1000 milliLiter(s) (100 mL/Hr) IV Continuous <Continuous>  tamsulosin 0.4 milliGRAM(s) Oral at bedtime    MEDICATIONS  (PRN):  haloperidol    Injectable 1 milliGRAM(s) IntraMuscular every 8 hours PRN Agitation  QUEtiapine 25 milliGRAM(s) Oral at bedtime PRN agitation    Allergies  No Known Allergies    REVIEW OF SYSTEMS   [ ] Due to altered mental status, subjective information were not able to be obtained from patient. History was obtained, to the extent possible, from review of the chart and collateral sources of information.    Vital Signs Last 24 Hrs  T(C): 37.2 (17 Oct 2023 15:00), Max: 37.2 (17 Oct 2023 15:00)  T(F): 98.9 (17 Oct 2023 15:00), Max: 98.9 (17 Oct 2023 15:00)  HR: 91 (17 Oct 2023 15:00) (78 - 93)  BP: 194/100 (17 Oct 2023 15:00) (158/105 - 204/98)  RR: 18 (17 Oct 2023 15:00) (17 - 19)  SpO2: 98% (17 Oct 2023 00:03) (98% - 99%)    PHYSICAL EXAM:  GEN: Elderly male in no acute distress, altered and agitated.  SKIN: Non diaphoretic.  RESP: No dyspnea, non-labored breathing. No use of accessory muscles.  CARDIO: +S1/S2  ABDO: Soft, NT/ND. Palpable bladder, no suprapubic tenderness.  : Non circumcised male, dried blood at tip of meatus. B/L descended testicles x 2. No lesions or palpable masses noted B/L.    LABS:             13.4   9.46  )-----------( 276      ( 17 Oct 2023 06:16 )             40.6     10-17  140  |  100  |  10  ----------------------------<  109<H>  4.0   |  29  |  0.9    Ca    9.7      17 Oct 2023 06:16    TPro  6.4  /  Alb  4.1  /  TBili  0.4  /  DBili  x   /  AST  13  /  ALT  5   /  AlkPhos  118<H>  10-16    RADIOLOGY & ADDITIONAL STUDIES:  < from: US Kidney and Bladder (10.17.23 @ 10:29) >  FINDINGS:    Right kidney: 10.5 cm. No hydronephrosis. Several renal cysts, largest   measuring 4 cm in the midpole. Nonobstructing lower pole renal stone   measures 0.8 cm.    Left kidney:  9.1 cm. No hydronephrosis or calculi.    Urinary bladder: There is layering debris and a 1 cm stone. The right   ureteral jet is not visualized, nonspecific. The left ureteral jet is   visualized. Prevoid volume of approximately 332 cc. Patient is sedated is   incontinent, and therefore the post voidresidual was not obtained.    IMPRESSION:    Several right renal cysts, largest measuring 4 cm in the midpole.    Nonobstructing right renal lower pole 0.8 cm stone.    Urinary bladder layering debris and stone.  < end of copied text >

## 2023-10-17 NOTE — PATIENT PROFILE ADULT - HOW PATIENT ADDRESSED, PROFILE
Goal Outcome Evaluation:  Plan of Care Reviewed With: patient           Outcome Evaluation: Patient is resting in bed, worked with therapy and seems to be in good spirits. on PO Bumex and has been urinating frequently.Pt communicated with Case Management to help find PCP. Patient has no complaints at this time. will continue to monitor   lopez

## 2023-10-17 NOTE — CONSULT NOTE ADULT - ASSESSMENT
Pt is a 74yo Male with no significant PMH who presented to the hospital and was admitted with increasing weakness and agitation. UROLOGY consulted for urinary retention & blood at tip of meatus.     PLAN:  -s/p 14Fr coude placement -- 400ccs of blood-tinged urine initially then thin tea colored  -UA, UCX  -Flomax if not contraindicated  -Irrigate mccormick with 100ccs sterile water as needed for decreased urine output  -RBUS w/o hydro, Cr wnl  -Will d/w attending Pt is a 72yo Male with no significant PMH who presented to the hospital and was admitted with increasing weakness and agitation. UROLOGY consulted for urinary retention & blood at tip of meatus.     PLAN:  -s/p 14Fr coude placement -- 400ccs of blood-tinged urine initially then thin tea colored  -UA, UCX  -Flomax if not contraindicated  -Irrigate mccormick with 100ccs sterile water as needed for decreased urine output  -RBUS w/o hydro but 1cm bladder stone, Cr wnl  -Will d/w attending

## 2023-10-17 NOTE — PROGRESS NOTE ADULT - SUBJECTIVE AND OBJECTIVE BOX
24H events:    Patient is a 73y old Male who presents with a chief complaint of Weakness, confusion, inability to ambulate (16 Oct 2023 15:48)    Primary diagnosis of Weakness      Day 1:  Day 2:  Day 3:     Today is hospital day 1d. This morning patient was seen and examined at bedside, resting comfortably in bed.    No acute or major events overnight. Hemodynamically stable, tolerating oral diet, voiding appropriately with appropriate bowel movements.     PAST MEDICAL & SURGICAL HISTORY  Hypertension, unspecified type    No significant past surgical history      SOCIAL HISTORY:  Social History:      ALLERGIES:  No Known Allergies    MEDICATIONS:  STANDING MEDICATIONS  enoxaparin Injectable 40 milliGRAM(s) SubCutaneous every 24 hours  lactated ringers. 1000 milliLiter(s) IV Continuous <Continuous>    PRN MEDICATIONS  QUEtiapine 25 milliGRAM(s) Oral at bedtime PRN    VITALS:   T(F): 98  HR: 85  BP: 184/102  RR: 18  SpO2: 98%    PHYSICAL EXAM:  GENERAL: NAD, lying in bed comfortably, obtunded, lethargic,  somnolent, cooperative, elderly  HEAD: NCAD, no hematoma or laceration   NECK: Supple, no neck stiffness/nuchal rigidity, no JVD    HEART: Regular rate and rhythm, normal S1/S2, no murmurs, heaves, thrills  LUNGS: No acute respiratory distress, clear b/l breath sounds, no wheezing, rales, rhonchi  ABDOMEN:  soft, non-tender, non-distended, + BS, no hepatosplenomegaly   EXTREMITIES: no rashes, extremities warm/dry, no cyanosis, no edema, ulcerations or ecchymosis  NERVOUS SYSTEM:  A&Ox3, CNII-XII intace, follows commands, answers questions appropriately   SKIN: No rashes or lesions         LABS:                        12.1   7.40  )-----------( 246      ( 16 Oct 2023 12:55 )             36.4     10-    138  |  98  |  10  ----------------------------<  111<H>  3.4<L>   |  30  |  0.9    Ca    8.6      16 Oct 2023 12:55    TPro  6.4  /  Alb  4.1  /  TBili  0.4  /  DBili  x   /  AST  13  /  ALT  5   /  AlkPhos  118<H>  10-16      Urinalysis Basic - ( 16 Oct 2023 14:11 )    Color: Yellow / Appearance: Clear / S.009 / pH: x  Gluc: x / Ketone: Negative mg/dL  / Bili: Negative / Urobili: 0.2 mg/dL   Blood: x / Protein: Negative mg/dL / Nitrite: Negative   Leuk Esterase: Trace / RBC: 1 /HPF / WBC 6 /HPF   Sq Epi: x / Non Sq Epi: 0 /HPF / Bacteria: Negative /HPF                RADIOLOGY:    RADIOLOGY     24H events:    Patient is a 73y old Male who presents with a chief complaint of Weakness, confusion, inability to ambulate (16 Oct 2023 15:48)    Primary diagnosis of Weakness    Today is hospital day 1d. This morning patient was seen and examined at bedside, resting comfortably in bed.    Overnight, patient was agitated and was given haldol and put on seroquel.  He was also hypertensive and given a dose of labetalol.  Hemodynamically stable, tolerating oral diet, voiding appropriately with appropriate bowel movements.  PAST MEDICAL & SURGICAL HISTORY  Hypertension, unspecified type    No significant past surgical history      SOCIAL HISTORY:  Social History:      ALLERGIES:  No Known Allergies    MEDICATIONS:  STANDING MEDICATIONS  enoxaparin Injectable 40 milliGRAM(s) SubCutaneous every 24 hours  lactated ringers. 1000 milliLiter(s) IV Continuous <Continuous>    PRN MEDICATIONS  QUEtiapine 25 milliGRAM(s) Oral at bedtime PRN    VITALS:   T(F): 98  HR: 85  BP: 184/102  RR: 18  SpO2: 98%    PHYSICAL EXAM:  GENERAL: NAD, lying in bed comfortably, sleeping.   HEAD: NCAD, no hematoma or laceration   NECK: Supple,    HEART: Regular rate and rhythm, normal S1/S2,   LUNGS:  b/l breath sounds, no wheezing, rales, rhonchi  ABDOMEN:  soft, non-tender, non-distended,   EXTREMITIES: no rashes, extremities warm/dry, no cyanosis, no edema, ulcerations or ecchymosis  SKIN: No new rashes or lesions         LABS:                        12.1   7.40  )-----------( 246      ( 16 Oct 2023 12:55 )             36.4     10-16    138  |  98  |  10  ----------------------------<  111<H>  3.4<L>   |  30  |  0.9    Ca    8.6      16 Oct 2023 12:55    TPro  6.4  /  Alb  4.1  /  TBili  0.4  /  DBili  x   /  AST  13  /  ALT  5   /  AlkPhos  118<H>  10-16      Urinalysis Basic - ( 16 Oct 2023 14:11 )    Color: Yellow / Appearance: Clear / S.009 / pH: x  Gluc: x / Ketone: Negative mg/dL  / Bili: Negative / Urobili: 0.2 mg/dL   Blood: x / Protein: Negative mg/dL / Nitrite: Negative   Leuk Esterase: Trace / RBC: 1 /HPF / WBC 6 /HPF   Sq Epi: x / Non Sq Epi: 0 /HPF / Bacteria: Negative /HPF                RADIOLOGY:    RADIOLOGY

## 2023-10-17 NOTE — PROGRESS NOTE ADULT - ASSESSMENT
Patient is a 74y/o male with no PMH brought in by niece for concerns of increasing weakness, agitation and inability to take care of himself.       A/P   # Advanced vascular dementia with behavioral disturbance/ Anorexia/ Suspected Dysphagia   - supportive care, fall and aspiration precautions   - HCT is significant for cortical loss and chronic ischemic changes   - f/u TSH, B12, RPR in the AM f/u levels  - get REEG  - consult speech and swallow, if passes start Dronabinol 2.5 mg TID   - consult dietitian  - start IV hydration while NPO   - consult psychiatry   - check lipid profile and A1C  - start  Haldol 1mg Q8 prn for agitation     # Elevated BP  - DASH diet   - start lisinopril 5 mg Q 24 hours     # Urinary retention/ suspected BPH/ Elevated Alk phos   - start Flomax  and IV fluids   - monitor urine output, BUN/cr   - will get bladder scan to r/o obstruction   - check PSA     # Hyperglycemia  - check Hb A1C      #MISC  - Diet: dash tlc  - GI ppx: not needed  - DVT ppx: Lovenox SQ daily  - Activity: ambulate AT, pending PT    #Progress Note Handoff  Pending (specify): start IV fluids, Flomax, Lisinopril Haldol PRN for agitation, check PSA, TSH, FT4,  B12, RPR, get REEG , consult psychiatry, monitor urine output, get bladder scan postvoid to r/o retention, pt needs placement   Family discussion: case d/w family at the bedside, they agreed with a plan of care, pt's contact is Niece is Physiatrist: [phone], visiting from [place]   Disposition: Home___/SNF___/Other________/Unknown at this time___x _____

## 2023-10-17 NOTE — SWALLOW BEDSIDE ASSESSMENT ADULT - SWALLOW EVAL: DIAGNOSIS
+ toleration of easy to chew w/ mildly thick liquids w/o overt s/s of aspiration/penetration. Overt s/s of aspiration/penetration w/ thin liquids.

## 2023-10-18 NOTE — DIETITIAN INITIAL EVALUATION ADULT - ADD RECOMMEND
1. ADD Ensure Plus HP 3X/DAILY -- provides 1050 kcal/day total, 60g protein/day; ADD MAGIC CUP 2X/DAILY -- provides 580 kcal/day, 18g protein/day  2. Continue with current diet order per SLP recs  3. Encourage PO intake and assist during meals prn    Pt is at high nutrition risk; will f/u in 3 days or prn.

## 2023-10-18 NOTE — DIETITIAN INITIAL EVALUATION ADULT - PERTINENT MEDS FT
MEDICATIONS  (STANDING):  ampicillin/sulbactam  IVPB      ampicillin/sulbactam  IVPB 1.5 Gram(s) IV Intermittent every 6 hours  lactated ringers. 1000 milliLiter(s) (50 mL/Hr) IV Continuous <Continuous>  lisinopril 5 milliGRAM(s) Oral daily  OLANZapine 2.5 milliGRAM(s) Oral at bedtime  sodium chloride 0.9%. 1000 milliLiter(s) (100 mL/Hr) IV Continuous <Continuous>  tamsulosin 0.4 milliGRAM(s) Oral at bedtime    MEDICATIONS  (PRN):  haloperidol    Injectable 1 milliGRAM(s) IntraMuscular every 8 hours PRN Agitation
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
## 2023-10-18 NOTE — DIETITIAN INITIAL EVALUATION ADULT - NAME AND PHONE
Heidy [phone] or TEAMS    Nutrition Interventions: meals, snacks, medical nutrition supplements; Nutrition Monitoring: Diet order, PO intake, weights, labs, NFPF, body composition, BM and tolerance to medical food supplements

## 2023-10-18 NOTE — DIETITIAN INITIAL EVALUATION ADULT - OTHER INFO
Pertinent Medical Information: Per H&P, pt is a 74 y/o male with no PMH not on any home meds and history of service in [place] armed [place] during [place] war, lives at home with his sister and is brought in by niece for concerns of increasing weakness, agitation and inability to take care of himself. Niece also states his living environment is not safe for him at home and patient endorses being unable to walk and take care of himself.     Per SLP note on 18-Oct-2023, recommend easy to chew / mildly thick liquids.    Pertinent Subjective Information: Per staff, pt refusing to consume meals provided in-house. Observed breakfast tray this morning; pt consumed 0% of meal. No nausea or vomiting reported.     Weight hx: UBW unknown. Current dosing weight is 57.8 KG. No previous admission weights in EMR. Will attempt to obtain UBW at follow up. Unable to determine if pt meets weight criteria for malnutrition at this time. Please obtain current height.
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
## 2023-10-18 NOTE — EEG REPORT - NS EEG TEXT BOX
[facility] Department of Neurology  Inpatient Routine  ElectroEncephaloGram (EEG)    Patient Name:	RUTH BARRETT    :	1950  MRN:	-    Study Start Date/Time:	10/18/2023, 11:56:03 AM      Brief Clinical History:  RUTH BARRETT is a 73 year old Male; study performed to investigate for seizures or markers of epilepsy.   Technologist notes: CONFUSION, AMS, WEAKNESS, INBILITY TO AMBULATE    Diagnosis Code:  R56.9 convulsions/seizure  CPT:   32508 (awake/sleep)     Pertinent Medication:	  n/a    Acquisition Details:  Electroencephalography was acquired using a minimum of 21 channels on an [employer] system v 9.3.1 with electrode placement according to the standard International 10-20 system following ACNS (American Clinical Neurophysiology Society) guidelines.  Anterior temporal T1 and T2 electrodes were utilized whenever possible    Findings:  Background:  continuous, with predominantly alpha and beta frequencies.  Voltage:  Normal (20+ uV)  Generalized Slowing:  Intermittent rare sporadic polymorphic delta  Organization:  Appropriate anterior-posterior gradient  Posterior Dominant Rhythm:  8-8.5 Hz symmetric, well-organized, and well-modulated  Focal abnormalities:  No persistent asymmetries of voltage or frequency.  Sleep:  Absent.  Spontaneous Activity:  No epileptiform discharges    Events:  No electrographic seizures.   	Significant movement artifact throughout the recording.   Provocations:  1)	Hyperventilation: was not performed.  2)	Photic stimulation: was not performed.  Impression:   Suboptimal study due to movement artifact, but likely abnormal routine EEG, awake and drowsy. .  1)	There was diffuse slowing of electrographic activity    Final Clinical Correlation:  1)	There were indicators of Diffuse or multifocal cerebral dysfunction      Ivania Jordan MD  Attending Neurologist, [facility]

## 2023-10-18 NOTE — DIETITIAN INITIAL EVALUATION ADULT - ORAL INTAKE PTA/DIET HISTORY
Pt unable to participate in nutrition assessment; disoriented per flow sheet. Unable to reach emergency contact to obtain nutrition assessment at this time. Will attempt to again at follow up. Hx limited to medical chart.

## 2023-10-18 NOTE — CONSULT NOTE ADULT - SUBJECTIVE AND OBJECTIVE BOX
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 Ramsey also states his living environment is not safe for him at home and patient endorses being unable to walk and take care of himself. At time of encounter patient has grossly normal UE and LE strength, his speech is slurred, and he is slightly confused to questioning. Exam is otherwise normal, neuro-imaging was done which revealed no acute process in addition to age-indeterminate ischemic changes. The patient is admitted to medicine for weakness, confusion, and inability to care for self.      # Confusion with weakness and inability to ambulate:   # underlying dementia?  - Brought in to ER by his niece who says his living environment at home is unsafe and mental status has acutely deteriorated to the point of patient being unable to care for himself.   - Given that patient is  of the [place] war, leo would like SW to be involved and [facility] [place]  - Will check TSH, B12, RPR in the AM f/u levels  - F/u SW and PT recs  - Check basic labs  - Niece wonder's if patient has underlying PTSD (she also works in healthcare), although he denies insomnia, anxiety, or PTSD symptoms. May consider psych consult if he develops the above symptoms  10/17  - check lipid profile and A1C  - Psychiatry consulted  - Haldol 1mg Q8 prn  10/18  - on seroquel 25mg QHS and haldol 1mg Q8H PRN  - will switch seroquel to zyprexa.  - Psychiatry evaluation today.      PAST MEDICAL & SURGICAL HISTORY:  Hypertension, unspecified type      No significant past surgical history          Hospital Course:    TODAY'S SUBJECTIVE & REVIEW OF SYMPTOMS:     Constitutional WNL   Cardio WNL   Resp WNL   GI WNL  Heme WNL  Endo WNL  Skin WNL  MSK Weakness   Neuro AMS   Cognitive WNL  Psych WNL      MEDICATIONS  (STANDING):  ampicillin/sulbactam  IVPB      ampicillin/sulbactam  IVPB 1.5 Gram(s) IV Intermittent every 6 hours  lactated ringers. 1000 milliLiter(s) (50 mL/Hr) IV Continuous <Continuous>  lisinopril 5 milliGRAM(s) Oral daily  OLANZapine 2.5 milliGRAM(s) Oral at bedtime  sodium chloride 0.9%. 1000 milliLiter(s) (100 mL/Hr) IV Continuous <Continuous>  tamsulosin 0.4 milliGRAM(s) Oral at bedtime    MEDICATIONS  (PRN):  haloperidol    Injectable 1 milliGRAM(s) IntraMuscular every 8 hours PRN Agitation      FAMILY HISTORY:      Allergies    No Known Allergies    Intolerances        SOCIAL HISTORY:    [  ] Etoh  [  ] Smoking  [  ] Substance abuse     Home Environment:  [   ] Home Alone  [ x  ] Lives with Family  [   ] Home Health Aid    Dwelling:  [   ] Apartment  [  x ] Private House  [   ] Adult Home  [   ] Skilled Nursing Facility      [   ] Short Term  [   ] Long Term  [   ] Stairs       Elevator [   ]    FUNCTIONAL STATUS PTA: (Check all that apply)  Ambulation: [    ]Independent    [ x  ] Dependent     [   ] Non-Ambulatory  Assistive Device: [   ] SA Cane  [   ]  Q Cane  [   ] Walker  [   ]  Wheelchair  ADL : [   ] Independent  [  x  ]  Dependent       Vital Signs Last 24 Hrs  T(C): 36.8 (17 Oct 2023 20:58), Max: 36.8 (17 Oct 2023 20:58)  T(F): 98.3 (17 Oct 2023 20:58), Max: 98.3 (17 Oct 2023 20:58)  HR: 78 (17 Oct 2023 20:58) (78 - 78)  BP: 180/91 (17 Oct 2023 20:58) (162/86 - 180/91)  BP(mean): --  RR: 18 (17 Oct 2023 20:58) (18 - 18)  SpO2: 97% (18 Oct 2023 00:14) (97% - 97%)    Parameters below as of 18 Oct 2023 00:14  Patient On (Oxygen Delivery Method): room air          PHYSICAL EXAM: lethargic   GENERAL: NAD  HEAD:  Normocephalic  CHEST/LUNG: Clear   HEART: S1S2+  ABDOMEN: Soft, Nontender  EXTREMITIES:  no calf tenderness, sabrina wrist restrained     NERVOUS SYSTEM:  Cranial Nerves 2-12 intact [   ] Abnormal  [   ]  ROM: WFL all extremities [   ]  Abnormal [   ]unable - pt did not cooperate   Motor Strength: WFL all extremities  [   ]  Abnormal [   ]  Sensation: intact to light touch [   ] Abnormal [   ]    FUNCTIONAL STATUS:  Bed Mobility: Independent [   ]  Supervision [   ]  Needs Assistance [  x ]  N/A [   ]  Transfers: Independent [   ]  Supervision [   ]  Needs Assistance [   ]  N/A [   ]   Ambulation: Independent [   ]  Supervision [   ]  Needs Assistance [   ]  N/A [   ]  ADL: Independent [   ] Requires Assistance [   ] N/A [   ]      LABS:                        13.4   9.46  )-----------( 276      ( 17 Oct 2023 06:16 )             40.6     10-17    140  |  100  |  10  ----------------------------<  109<H>  4.0   |  29  |  0.9    Ca    9.7      17 Oct 2023 06:16        Urinalysis Basic - ( 17 Oct 2023 06:16 )    Color: x / Appearance: x / SG: x / pH: x  Gluc: 109 mg/dL / Ketone: x  / Bili: x / Urobili: x   Blood: x / Protein: x / Nitrite: x   Leuk Esterase: x / RBC: x / WBC x   Sq Epi: x / Non Sq Epi: x / Bacteria: x        RADIOLOGY & ADDITIONAL STUDIES:

## 2023-10-18 NOTE — DIETITIAN INITIAL EVALUATION ADULT - OTHER CALCULATIONS
Using ABW: ENERGY: 7770-5859 kcal/day (30-35 kcal/kg); PROTEIN: 75-92 g/day (1.3-1.6 g/kg); FLUID: 1mL/kcal -- with consideration for age, BMI, malnutrition

## 2023-10-18 NOTE — DIETITIAN NUTRITION RISK NOTIFICATION - TREATMENT: THE FOLLOWING DIET HAS BEEN RECOMMENDED
Diet, Easy to Chew:   Mildly Thick Liquids (MILDTHICKLIQS)  Free Water Flush Instructions:  MAGIC CUP FOR LUNCH AND DINNER; PLEASE ADD 2 PACKETS OF THICKENER PER ENSURE PLUS HP  Supplement Feeding Modality:  Oral  Ensure Plus High Protein Cans or Servings Per Day:  3       Frequency:  Daily (10-18-23 @ 13:47) [Pending Verification By Attending]  Diet, Easy to Chew:   Mildly Thick Liquids (MILDTHICKLIQS) (10-17-23 @ 15:32) [Active]

## 2023-10-18 NOTE — BH CONSULTATION LIAISON ASSESSMENT NOTE - NSBHCHARTREVIEWVS_PSY_A_CORE FT
Vital Signs Last 24 Hrs  T(C): 36.8 (17 Oct 2023 20:58), Max: 37.2 (17 Oct 2023 15:00)  T(F): 98.3 (17 Oct 2023 20:58), Max: 98.9 (17 Oct 2023 15:00)  HR: 78 (17 Oct 2023 20:58) (78 - 91)  BP: 180/91 (17 Oct 2023 20:58) (162/86 - 194/100)  BP(mean): --  RR: 18 (17 Oct 2023 20:58) (18 - 18)  SpO2: 97% (18 Oct 2023 00:14) (97% - 97%)    Parameters below as of 18 Oct 2023 00:14  Patient On (Oxygen Delivery Method): room air

## 2023-10-18 NOTE — PROGRESS NOTE ADULT - SUBJECTIVE AND OBJECTIVE BOX
24H events:    Patient is a 73y old Male who presents with a chief complaint of Weakness, confusion, inability to ambulate (17 Oct 2023 16:39)    Primary diagnosis of Weakness    Today is hospital day 2d. This morning patient was seen and examined at bedside, sleeping in bed. This morning patient was found to be very agitated. He pulled out his IV line and had a bowel movement on top of the garbage can. Per nurse, patient was playing with stool and tugging on indwelling catheter.     PAST MEDICAL & SURGICAL HISTORY  Hypertension, unspecified type    No significant past surgical history      SOCIAL HISTORY:  Social History:      ALLERGIES:  No Known Allergies    MEDICATIONS:  STANDING MEDICATIONS  enoxaparin Injectable 40 milliGRAM(s) SubCutaneous every 24 hours  lactated ringers. 1000 milliLiter(s) IV Continuous <Continuous>  lisinopril 5 milliGRAM(s) Oral daily  OLANZapine 2.5 milliGRAM(s) Oral at bedtime  sodium chloride 0.9%. 1000 milliLiter(s) IV Continuous <Continuous>  tamsulosin 0.4 milliGRAM(s) Oral at bedtime    PRN MEDICATIONS  haloperidol    Injectable 1 milliGRAM(s) IntraMuscular every 8 hours PRN    VITALS:   T(F): 98.3  HR: 78  BP: 180/91  RR: 18  SpO2: 97%    PHYSICAL EXAM:  GENERAL: NAD, lying in bed sleeping elderly  HEAD: NCAD,  HEART: Regular rate and rhythm, normal S1/S2,  LUNGS: No acute respiratory distress,   ABDOMEN:  non-distended,  EXTREMITIES: no rashes, no edema,          LABS:                        13.4   9.46  )-----------( 276      ( 17 Oct 2023 06:16 )             40.6     10-17    140  |  100  |  10  ----------------------------<  109<H>  4.0   |  29  |  0.9    Ca    9.7      17 Oct 2023 06:16    TPro  6.4  /  Alb  4.1  /  TBili  0.4  /  DBili  x   /  AST  13  /  ALT  5   /  AlkPhos  118<H>  10-16      Urinalysis Basic - ( 17 Oct 2023 06:16 )    Color: x / Appearance: x / SG: x / pH: x  Gluc: 109 mg/dL / Ketone: x  / Bili: x / Urobili: x   Blood: x / Protein: x / Nitrite: x   Leuk Esterase: x / RBC: x / WBC x   Sq Epi: x / Non Sq Epi: x / Bacteria: x            Culture - Urine (collected 16 Oct 2023 14:11)  Source: Clean Catch Clean Catch (Midstream)  Preliminary Report (17 Oct 2023 23:58):    >100,000 CFU/ml Gram positive organisms    <10,000 CFU/ml Normal Urogenital tami present          RADIOLOGY:    RADIOLOGY

## 2023-10-18 NOTE — BH CONSULTATION LIAISON ASSESSMENT NOTE - SUMMARY
74yo M, domiciled with sister per family, , no known past medical history, hx therapy and possible psych medications, no known psych hospitalizations/SAs/self harm/aggression who presents to the hospital for weakness, agitation, family concern about ability to care for self. Psych consulted for evaluation, agitation, med management.    Current presentation likely in the context of delirium vs. dementia vs. combination rather than any primary psychiatric disorder given information obtained thusfar. No hx psych hospitalizations, SA, aggression per collateral. Possible hx of PTSD, and these symptoms can be accentuated with delirium/dementia. Would continue clonidine (used in PTSD, can also help with agitation) and olanzapine (can help with agitation in delirium/dementia) at this time.     Recommendations:   -Continue clonidine 0.1mg/24hr patch. Can consider increasing to 0.2mg if pt does not have side effects and still has agitation.  -Continue olanzapine 2.5mg qHS. Recommend Zydis (immediate dissolvable formulation). Hold for qtc >500.  -For acute agitation, can provide Zyprexa 2.5mg q8 PRN. Hold for qtc >500.  -Please make sure to do EKG to monitor qtc   -Would d/c haldol and attempt to avoid multiple antipsychotics/polypharmacy.  -Delirium precautions below  -PT/OT, case management    Please follow the following precautions:  - Avoid use of anticholinergic, antihistaminergic, benzodiazepine and opioid medications if possible as these can be deliriogenic. Avoid polypharmacy.  - Assess for pain and provide analgesia as needed  - Maintain normal sleep-wake cycle: daytime awake / night-time asleep, light/dark in room  - Encourage orientation to location, date, individuals   -Monitor nutritional intake, hydration, voiding  - Mobilize as tolerated during the day (i.e. transfer to chair / seated position even if unable to ambulate otherwise)  - Minimize excessive noise   - Ensure access to any sensory aides used prior to admission (i.e. glasses, hearing aids etc)  -Minimize use of physical restraints as possible  - Ensure communication with family, caregivers, HCPs as possible 72yo M, domiciled with sister per family, , no known past medical history, hx therapy and possible psych medications, no known psych hospitalizations/SAs/self harm/aggression who presents to the hospital for weakness, agitation, family concern about ability to care for self. Psych consulted for evaluation, agitation, med management.    Current presentation likely in the context of delirium vs. dementia vs. combination rather than any primary psychiatric disorder given information obtained thusfar. No hx psych hospitalizations, SA, aggression per collateral. Possible hx of PTSD, and these symptoms can be accentuated with delirium/dementia. Would continue clonidine (used in PTSD, can also help with agitation) and olanzapine (can help with agitation in delirium/dementia) at this time.     Recommendations:   -Continue clonidine 0.1mg/24hr patch. Can consider increasing to 0.2mg if pt does not have side effects and still has agitation.  -Continue olanzapine 2.5mg qHS. Recommend Zydis (immediate dissolvable formulation). Hold for qtc >500.  -For acute agitation, can provide Zyprexa 2.5mg q8 PRN. Hold for qtc >500.  -Please make sure to do EKG to monitor qtc   -Would d/c haldol and attempt to avoid multiple antipsychotics/polypharmacy.  -Delirium precautions below  -Consider neuro consult  -Medical workup per primary team   -PT/OT, case management    Please follow the following precautions:  - Avoid use of anticholinergic, antihistaminergic, benzodiazepine and opioid medications if possible as these can be deliriogenic. Avoid polypharmacy.  - Assess for pain and provide analgesia as needed  - Maintain normal sleep-wake cycle: daytime awake / night-time asleep, light/dark in room  - Encourage orientation to location, date, individuals   -Monitor nutritional intake, hydration, voiding  - Mobilize as tolerated during the day (i.e. transfer to chair / seated position even if unable to ambulate otherwise)  - Minimize excessive noise   - Ensure access to any sensory aides used prior to admission (i.e. glasses, hearing aids etc)  -Minimize use of physical restraints as possible  - Ensure communication with family, caregivers, HCPs as possible

## 2023-10-18 NOTE — CONSULT NOTE ADULT - ASSESSMENT
IMPRESSION: Rehab of deconditioning / Confusion with weakness and inability to ambulate    PRECAUTIONS: [   ] Cardiac  [   ] Respiratory  [   ] Seizures [   ] Contact Isolation  [   ] Droplet Isolation  [   ] Other    Weight Bearing Status:     RECOMMENDATION:    Out of Bed to Chair     DVT/Decubiti Prophylaxis    REHAB PLAN:     [  x  ] Bedside P/T 3-5 times a week   [    ]   Bedside O/T  2-3 times a week             [    ] Speech Therapy               [    ]  No Rehab Therapy Indicated   Conditioning/ROM                                    ADL  Bed Mobility                                               Conditioning/ROM  Transfers                                                     Bed Mobility  Sitting /Standing Balance                         Transfers                                        Gait Training                                               Sitting/Standing Balance  Stair Training [   ]Applicable                    Home equipment Eval                                                                        Splinting  [   ] Only      GOALS:   ADL   [    ]   Independent                    Transfers  [    ] Independent                          Ambulation  [    ] Independent     [   x  ] With device                            [    ]  CG                                                         [  x  ]  CG                                                                  [  x  ] CG                            [    ] Min A                                                   [    ] Min A                                                              [    ] Min  A          DISCHARGE PLAN:   [    ]  Good candidate for Intensive Rehabilitation/Hospital based                                             Will tolerate 3hrs Intensive Rehab Daily                                       [  x   ]  Short Term Rehab in Skilled Nursing Facility / SNF                                       [     ]  Home with Outpatient or  services                                         [     ]  Possible Candidate for Intensive Hospital based Rehab

## 2023-10-18 NOTE — DIETITIAN INITIAL EVALUATION ADULT - PERTINENT LABORATORY DATA
10-17    140  |  100  |  10  ----------------------------<  109<H>  4.0   |  29  |  0.9    Ca    9.7      17 Oct 2023 06:16

## 2023-10-18 NOTE — PROGRESS NOTE ADULT - ASSESSMENT
Patient is a 72y/o male with no PMH brought in by niece for concerns of increasing weakness, agitation and inability to take care of himself.     # Confusion with weakness and inability to ambulate:   # underlying dementia?  - Brought in to ER by his niece who says his living environment at home is unsafe and mental status has acutely deteriorated to the point of patient being unable to care for himself.   - Given that patient is  of the [place], niece would like SW to be involved and consider [facility] [place]  - Will check TSH, B12, RPR in the AM f/u levels  - F/u SW and PT recs  - Check basic labs  - Niece wonder's if patient has underlying PTSD (she also works in healthcare), although he denies insomnia, anxiety, or PTSD symptoms. May consider psych consult if he develops the above symptoms  10/17  - check lipid profile and A1C  - Psychiatry consulted  - Haldol 1mg Q8 prn  10/18  - on seroquel 25mg QHS and haldol 1mg Q8H PRN  - will switch seroquel to zyprexa.  - Psychiatry evaluation today.  - PT unable to assess      #Hypertension  - start linionpril 5  - hypertensive episode today likely 2/2 urinary retention s/p 10mg labetalol  - clonidine patch if patient continues to be hypertensive  10/18  - nurses unable to assess blood pressure due to patient agitated.  - Last BP 190s/90s s/p labetalol push and clonidine patch.  - continue clonidine patch, reassess BP when patient is stable.    #Urinary retention  #BPH  - blood clots at penile head likely due to traumatic straight catheterization  - Urology consulted  - following BUN/Cr  - bladder scan  - Start flomax.  - PSA levels   10/18  - PSA normal  - s/p indwelling catheter and 400cc of blood tinged urinedrained.  - pending URO recommendations    #Age-indeterminate ischemic changes on CT head  - May be contributing to change in mental status  - F/u BP control, outpatient f/u    # Hypokalemia: replete and repeat    # Isolated elevated ALP: follow up outpatient     #MISC  - Diet: dash tlc  - GI ppx: not needed  - DVT ppx: Lovenox SQ daily  - Activity: ambulate AT, pending PT  - Dispo: DC after psych evaluation and off restraints.

## 2023-10-19 NOTE — PROGRESS NOTE ADULT - SUBJECTIVE AND OBJECTIVE BOX
24H events:    Patient is a 73y old Male who presents with a chief complaint of Weakness, confusion, inability to ambulate (18 Oct 2023 15:03)    Primary diagnosis of Weakness    Today is hospital day 3d. This morning patient was seen and examined at bedside, resting comfortably in bed.    No acute or major events overnight. Still hypertensive, voiding through indwelling catheter and making stool.    PAST MEDICAL & SURGICAL HISTORY  Hypertension, unspecified type    No significant past surgical history      SOCIAL HISTORY:  Social History:      ALLERGIES:  No Known Allergies    MEDICATIONS:  STANDING MEDICATIONS  ampicillin/sulbactam  IVPB      ampicillin/sulbactam  IVPB 1.5 Gram(s) IV Intermittent every 6 hours  cloNIDine Patch 0.1 mG/24Hr(s) 1 patch Transdermal <User Schedule>  lisinopril 5 milliGRAM(s) Oral daily  OLANZapine 2.5 milliGRAM(s) Oral at bedtime  tamsulosin 0.4 milliGRAM(s) Oral at bedtime    PRN MEDICATIONS  OLANZapine 2.5 milliGRAM(s) Oral every 8 hours PRN    VITALS:   T(F): 96.8  HR: 102  BP: 103/58  RR: 18  SpO2: --    PHYSICAL EXAM:  GENERAL: NAD, lying in bed comfortably, lethargic, elderly  HEAD: NCAD, no hematoma or laceration   NECK: Supple,   HEART: Regular rate and rhythm, normal S1/S2,  LUNGS: No acute respiratory distress, clear b/l breath sounds,  ABDOMEN:  soft, non-tender, non-distended,  EXTREMITIES: no edema, ulcerations or ecchymosis  NERVOUS SYSTEM:  A&Ox1, answers questions   SKIN: No new rashes or lesions         LABS:                        12.3   7.76  )-----------( 228      ( 19 Oct 2023 08:33 )             37.4     10-19    139  |  102  |  16  ----------------------------<  116<H>  3.6   |  26  |  0.9    Ca    9.1      19 Oct 2023 08:33  Mg     2.3     10-19    TPro  6.2  /  Alb  3.7  /  TBili  0.8  /  DBili  x   /  AST  21  /  ALT  8   /  AlkPhos  118<H>  10-19      Urinalysis Basic - ( 19 Oct 2023 08:33 )    Color: x / Appearance: x / SG: x / pH: x  Gluc: 116 mg/dL / Ketone: x  / Bili: x / Urobili: x   Blood: x / Protein: x / Nitrite: x   Leuk Esterase: x / RBC: x / WBC x   Sq Epi: x / Non Sq Epi: x / Bacteria: x            Culture - Urine (collected 16 Oct 2023 14:11)  Source: Clean Catch Clean Catch (Midstream)  Preliminary Report (18 Oct 2023 14:25):    >100,000 CFU/ml Enterococcus faecalis    <10,000 CFU/ml Normal Urogenital tami present          RADIOLOGY:    RADIOLOGY

## 2023-10-19 NOTE — CONSULT NOTE ADULT - ATTENDING COMMENTS
Seen and examined the patient. Patient with no verbal output. No regards. Unable to assess motor. Hyperreflexia, Duarte and Babinski. Family reported poor PO intake for past month and no mobility. Per family was able to swallow with speech but had a cough. Recommend to r/o structural causes of dysphagia. Given the [facility] signs will r/o Brain and cervical spine pathology. Check myositis and myasthenia panel. Not compelling evidence of parkinson disease but needs more complete evaluation.

## 2023-10-19 NOTE — PROGRESS NOTE ADULT - ASSESSMENT
Patient is a 74y/o male with no PMH brought in by niece for concerns of increasing weakness, agitation and inability to take care of himself.     # Confusion with weakness and inability to ambulate:   # underlying dementia?  - Brought in to ER by his niece who says his living environment at home is unsafe and mental status has acutely deteriorated to the point of patient being unable to care for himself.   - Given that patient is  of the [place], niece would like SW to be involved and consider [facility] [place]  - Will check TSH, B12, RPR in the AM f/u levels  - F/u SW and PT recs  - Check basic labs  - Niece wonder's if patient has underlying PTSD (she also works in healthcare), although he denies insomnia, anxiety, or PTSD symptoms. May consider psych consult if he develops the above symptoms  10/17  - check lipid profile and A1C  - Psychiatry consulted  - Haldol 1mg Q8 prn  10/18  - on seroquel 25mg QHS and haldol 1mg Q8H PRN  - will switch seroquel to zyprexa.  - Psychiatry evaluation today.  - PT unable to assess  10/19  - Psychiatry recs appreciated. recommend Zydis 2.5mg qHS.  - continue zyprexa at 2.5mg q8  - daily ECG  - EEG: diffuse or multifocal cerebral dysfunction.  - Physiatry: Short term rehab in skilled nursing facility / SNF  - off restraints      #Hypertension  - start linionpril 5  - hypertensive episode today likely 2/2 urinary retention s/p 10mg labetalol  - clonidine patch if patient continues to be hypertensive  10/18  - nurses unable to assess blood pressure due to patient agitated.  - Last BP 190s/90s s/p labetalol push and clonidine patch.  - continue clonidine patch, reassess BP when patient is stable.  10/19  - Continue lisinopril 5mg  - s/p x2 Clonidine patch 0.1mg/24hr    #Urinary retention  #BPH  - blood clots at penile head likely due to traumatic straight catheterization  - Urology consulted  - following BUN/Cr  - bladder scan  - Start flomax.  - PSA levels   10/18  - PSA normal  - s/p indwelling catheter and 400cc of blood tinged urinedrained.  - pending URO recommendations    #Age-indeterminate ischemic changes on CT head  - May be contributing to change in mental status  - F/u BP control, outpatient f/u    # Hypokalemia: replete and repeat    # Isolated elevated ALP: follow up outpatient     #MISC  - Diet: dash tlc  - GI ppx: not needed  - DVT ppx: Lovenox SQ daily  - Activity: ambulate AT, pending PT  - Dispo: STR in skilled nursing facility.     Patient is a 72y/o male with no PMH brought in by niece for concerns of increasing weakness, agitation and inability to take care of himself.     # Confusion with weakness and inability to ambulate:   # underlying dementia?  - Brought in to ER by his niece who says his living environment at home is unsafe and mental status has acutely deteriorated to the point of patient being unable to care for himself.   - Given that patient is  of the [place], niece would like SW to be involved and consider [facility] [place]  - Will check TSH, B12, RPR in the AM f/u levels  - F/u SW and PT recs  - Check basic labs  - Niece wonder's if patient has underlying PTSD (she also works in healthcare), although he denies insomnia, anxiety, or PTSD symptoms. May consider psych consult if he develops the above symptoms  10/17  - check lipid profile and A1C  - Psychiatry consulted  - Haldol 1mg Q8 prn  10/18  - on seroquel 25mg QHS and haldol 1mg Q8H PRN  - will switch seroquel to zyprexa.  - Psychiatry evaluation today.  - PT unable to assess  10/19  - Psychiatry recs appreciated. recommend Zydis 2.5mg qHS.  - continue zyprexa at 2.5mg q8  - daily ECG  - EEG: diffuse or multifocal cerebral dysfunction.  - Physiatry: Short term rehab in skilled nursing facility / SNF  - off restraints, DC'd IV fluids.      #Hypertension  - start linionpril 5  - hypertensive episode today likely 2/2 urinary retention s/p 10mg labetalol  - clonidine patch if patient continues to be hypertensive  10/18  - nurses unable to assess blood pressure due to patient agitated.  - Last BP 190s/90s s/p labetalol push and clonidine patch.  - continue clonidine patch, reassess BP when patient is stable.  10/19  - Continue lisinopril 5mg  - s/p x2 Clonidine patch 0.1mg/24hr    #Urinary retention  #BPH  - blood clots at penile head likely due to traumatic straight catheterization  - Urology consulted  - following BUN/Cr  - bladder scan  - Start flomax.  - PSA levels   10/18  - PSA normal  - s/p indwelling catheter and 400cc of blood tinged urinedrained.  - pending URO recommendations  10/19  - trial of void    #Age-indeterminate ischemic changes on CT head  - May be contributing to change in mental status  - F/u BP control, outpatient f/u    # Hypokalemia: replete and repeat    # Isolated elevated ALP: follow up outpatient     #MISC  - Diet: dash tlc  - GI ppx: not needed  - DVT ppx: Lovenox SQ daily  - Activity: ambulate AT, pending PT  - Dispo: STR in skilled nursing facility.

## 2023-10-19 NOTE — SWALLOW BEDSIDE ASSESSMENT ADULT - SWALLOW EVAL: DIAGNOSIS
+ toleration of easy to chew w/ mildly thick liquids w/o overt s/s of aspiration/penetration. Suspected pharyngeal dysphagia w/ thin liquids
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
## 2023-10-19 NOTE — CONSULT NOTE ADULT - ASSESSMENT
Patient is a 73 yoM [place] with PMH of HTN not compliant with medications brought in by niece for agitation, generalized weakness/malnutrition, and confusion. Patient's unknown PMH of possible stroke 2 years ago and non controlled HTN along with patient's exam revealing UMN signs as well as history of step wise gait and balance/coordination issues, urinary retention, and rigidity. Patient was consulted for dysphagia but patient's speech evaluation revealed tolerating easy to chew diet.     Chronic Ischemic Infarcts vs Myasthenia gravis vs NPH vs Parkinsons     Plan:     -MRI Brain w/o  -MRI Cervical w/o  -MRI Thoracic w/o  -Myasthenia Gravis labs: Ach Ab, Musk Ab, LIP4, CK, LDH aldolase      Thank you for sharing this patient with me; please do not hesitate to contact me in case of any question.     Case discussion pending with attending Dr. Cardenas Patient is a 73 yoM [place] with PMH of HTN not compliant with medications brought in by niece for agitation, generalized weakness/malnutrition, and confusion. Patient's unknown PMH of possible stroke 2 years ago and non controlled HTN along with patient's exam revealing UMN signs as well as history of step wise gait and balance/coordination issues, urinary retention, and rigidity. Patient was consulted for dysphagia but patient's speech evaluation revealed tolerating easy to chew diet.     Chronic Ischemic Infarcts vs Myasthenia gravis vs NPH vs Parkinsons     Plan:     -Medical Management as per primary team  -MRI Brain w/o  -MRI Cervical w/o  -MRI Thoracic w/o  -Myasthenia Gravis labs: Ach Ab, Musk Ab, LIP4, CK, LDH aldolase      Thank you for sharing this patient with me; please do not hesitate to contact me in case of any question.     Case discussion pending with attending Dr. Cardenas Patient is a 73 yoM [place] with PMH of HTN not compliant with medications brought in by niece for agitation, generalized weakness/malnutrition, and confusion. Patient's unknown PMH of possible stroke 2 years ago and non controlled HTN along with patient's exam revealing UMN signs as well as history of step wise gait and balance/coordination issues, urinary retention, and rigidity. Patient was consulted for dysphagia but patient's speech evaluation revealed tolerating easy to chew diet.     Chronic Ischemic Infarcts vs Myasthenia gravis vs NPH vs Parkinson'd     Plan:   -Medical Management as per primary team  -MRI Brain w/o  -MRI Cervical w/o  -MRI Thoracic w/o  -Myasthenia Gravis labs: Ach Ab, Musk Ab, LRP-4, CK, LDH and aldolase  -Can order GI consult to r/o structural dysphagia  -If changes in neurological status or any further question, please contact back Neurology    Thank you for sharing this patient with me; please do not hesitate to contact me in case of any question.     Case discussion pending with attending Dr. Cardenas Patient is a 73 yoM [place] with PMH of HTN not compliant with medications brought in by niece for agitation, generalized weakness/malnutrition, and confusion. Patient's unknown PMH of possible stroke 2 years ago and non controlled HTN along with patient's exam revealing UMN signs as well as history of step wise gait and balance/coordination issues, urinary retention, and rigidity. Patient was consulted for dysphagia but patient's speech evaluation revealed tolerating easy to chew diet.     Chronic Ischemic Infarcts vs Myasthenia gravis vs NPH vs Parkinson'd     Plan:   -Medical Management as per primary team  -MRI Brain w/o  -MRI Cervical w/o  -MRI Thoracic w/o  -Myasthenia Gravis labs: Ach Ab, Musk Ab, LRP-4, CK, LDH and aldolase  -Can order GI consult to r/o structural dysphagia  -If changes in neurological status or any further question, please contact back Neurology    Thank you for sharing this patient with me; please do not hesitate to contact me in case of any question.     Case discussed with Neurology attending Dr. Cardenas Patient is a 73 yoM [place] with PMH of HTN not compliant with medications brought in by niece for agitation, generalized weakness/malnutrition, and confusion. Patient's unknown PMH of possible stroke 2 years ago and non controlled HTN along with patient's exam revealing UMN signs as well as history of step wise gait and balance/coordination issues, urinary retention, and rigidity. Patient was consulted for dysphagia but patient's speech evaluation revealed tolerating easy to chew diet.     Chronic Ischemic Infarcts vs Myasthenia gravis vs NPH vs Parkinson'd     Plan:     -MRI Brain w/o  -MRI Cervical w/o  -MRI Thoracic w/o  -Myasthenia Gravis labs: Ach Ab, Musk Ab, LRP-4, CK, LDH and aldolase  -Can order GI consult to r/o structural dysphagia  -If changes in neurological status or any further question, please contact back Neurology    Thank you for sharing this patient with me; please do not hesitate to contact me in case of any question.     Case discussed with Neurology attending Dr. Cardenas

## 2023-10-20 NOTE — PHYSICAL THERAPY INITIAL EVALUATION ADULT - PERTINENT HX OF CURRENT PROBLEM, REHAB EVAL
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 Ramsey also states his living environment is not safe for him at home and patient endorses being unable to walk and take care of himself. At time of encounter patient has grossly normal UE and LE strength, his speech is slurred, and he is slightly confused to questioning. Exam is otherwise normal, neuro-imaging was done which revealed no acute process in addition to age-indeterminate ischemic changes. The patient is admitted to medicine for weakness, confusion, and inability to care for self.
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 Ramsey also states his living environment is not safe for him at home and patient endorses being unable to walk and take care of himself

## 2023-10-20 NOTE — PHYSICAL THERAPY INITIAL EVALUATION ADULT - GENERAL OBSERVATIONS, REHAB EVAL
Hold PT at this time. Pt. declining PT, stating that he does not want to participate "ever." Pt. educated on the importance of functional mobility assessment and training. Pt. continued to decline despite max encouragement. Per PCA, pt. pulled out mccormick yesterday (10/17/2023) and is experiencing discharge from penis (likely in pain). Will f/u with PT as appropriate.
PT IE [id number]. Chart reviewed. Pt encountered semi-reclined in bed. Pt appeared to lethargic and confused. However was able to follow some simple commands with max cueing
Completed RE as per family request. Family stating pt is more alert this time. pt encountered semi-reclined in bed with family at b/s. Pt appeared to be more alert and was able to follow commands.. pt also tends to become emotional and tearful

## 2023-10-20 NOTE — PROGRESS NOTE ADULT - SUBJECTIVE AND OBJECTIVE BOX
24H events:    Patient is a 73y old Male who presents with a chief complaint of Weakness, confusion, inability to ambulate (20 Oct 2023 15:01)    Primary diagnosis of Weakness      Today is hospital day 4d. This morning patient was seen and examined at bedside, resting comfortably in bed.    No acute or major events overnight. Hemodynamically stable, tolerating oral diet, voiding appropriately with appropriate bowel movements.     PAST MEDICAL & SURGICAL HISTORY  Hypertension, unspecified type    No significant past surgical history      SOCIAL HISTORY:  Social History:      ALLERGIES:  No Known Allergies    MEDICATIONS:  STANDING MEDICATIONS  ampicillin  IVPB 500 milliGRAM(s) IV Intermittent every 6 hours  lisinopril 5 milliGRAM(s) Oral daily  OLANZapine 2.5 milliGRAM(s) Oral at bedtime  sodium chloride 0.9%. 1000 milliLiter(s) IV Continuous <Continuous>  tamsulosin 0.4 milliGRAM(s) Oral at bedtime    PRN MEDICATIONS  LORazepam   Injectable 1 milliGRAM(s) IV Push once PRN  OLANZapine 2.5 milliGRAM(s) Oral every 8 hours PRN    VITALS:   T(F): 97.2  HR: 101  BP: 103/53  RR: 18  SpO2: --    PHYSICAL EXAM:  GENERAL: NAD, lying in bed comfortably, lethargic, elderly  HEAD: NCAD, no hematoma or laceration   NECK: Supple,   HEART: Regular rate and rhythm, normal S1/S2,  LUNGS: No acute respiratory distress, clear b/l breath sounds,  ABDOMEN:  soft, non-tender, non-distended,  EXTREMITIES: no edema, ulcerations or ecchymosis  NERVOUS SYSTEM:  A&Ox1, answers questions, able to move all extremities, no focal deficits.  SKIN: No new rashes or lesions           LABS:                        11.8   7.81  )-----------( 240      ( 20 Oct 2023 07:38 )             36.6     10-20    140  |  102  |  26<H>  ----------------------------<  102<H>  3.9   |  25  |  1.3    Ca    9.1      20 Oct 2023 07:38  Mg     2.3     10-19    TPro  6.0  /  Alb  3.8  /  TBili  0.6  /  DBili  x   /  AST  13  /  ALT  7   /  AlkPhos  103  10-20      Urinalysis Basic - ( 20 Oct 2023 07:38 )    Color: x / Appearance: x / SG: x / pH: x  Gluc: 102 mg/dL / Ketone: x  / Bili: x / Urobili: x   Blood: x / Protein: x / Nitrite: x   Leuk Esterase: x / RBC: x / WBC x   Sq Epi: x / Non Sq Epi: x / Bacteria: x        Creatine Kinase, Serum: 98 U/L (10-20-23 @ 11:50)      CARDIAC MARKERS ( 20 Oct 2023 11:50 )  x     / x     / 98 U/L / x     / x          RADIOLOGY:    RADIOLOGY

## 2023-10-20 NOTE — PHYSICAL THERAPY INITIAL EVALUATION ADULT - LEVEL OF INDEPENDENCE: BED TO CHAIR, REHAB EVAL
NA: safety concerns. pt left in bed in chair mode with family at b/s
NA: pts cognition and arousal status

## 2023-10-20 NOTE — PHYSICAL THERAPY INITIAL EVALUATION ADULT - TRANSFER SAFETY CONCERNS NOTED: SIT/STAND, REHAB EVAL
decreased balance during turns/decreased safety awareness/stand pivot/decreased weight-shifting ability

## 2023-10-20 NOTE — PHYSICAL THERAPY INITIAL EVALUATION ADULT - BALANCE TRAINING, PT EVAL
improve sitting balance 1/2 grade by d/c
improve static/dynamic sitting and standing balance 1/2 grade by d/c

## 2023-10-20 NOTE — SWALLOW BEDSIDE ASSESSMENT ADULT - COMMENTS
Neuro consulted 10/19/23- Chronic Ischemic Infarcts vs Myasthenia gravis vs NPH vs Parkinson's Pt w/ overt s/s of aspiration/penetration while consuming thin liquids.

## 2023-10-20 NOTE — SWALLOW BEDSIDE ASSESSMENT ADULT - SWALLOW EVAL: FEEDING ASSISTANCE
1:1 feed for strategies/frequent cues/help required

## 2023-10-20 NOTE — SWALLOW BEDSIDE ASSESSMENT ADULT - SWALLOW EVAL: RECOMMENDED FEEDING/EATING TECHNIQUES
oral hygiene/position upright (90 degrees)/small sips/bites

## 2023-10-20 NOTE — PROGRESS NOTE ADULT - SUBJECTIVE AND OBJECTIVE BOX
Pt seen and examined at bedside.          VITAL SIGNS (Last 24 hrs):  T(C): 36.2 (10-20-23 @ 14:50), Max: 36.2 (10-19-23 @ 19:51)  HR: 101 (10-20-23 @ 14:50) (80 - 101)  BP: 103/53 (10-20-23 @ 14:50) (103/53 - 146/67)  RR: 18 (10-20-23 @ 14:50) (18 - 18)        Daily Height in cm: 177.8 (20 Oct 2023 12:18)       I&O's Summary      PHYSICAL EXAM:  GENERAL: NAD   HEAD:  Atraumatic, Normocephalic  EYES:  conjunctiva and sclera clear  NECK: Supple, No JVD  CHEST/LUNG: Clear to auscultation bilaterally; No wheeze  HEART: Regular rate and rhythm; No murmurs, rubs, or gallops  ABDOMEN: Soft, Nontender, Nondistended; Bowel sounds present  EXTREMITIES:  2+ Peripheral Pulses, No clubbing, cyanosis, or edema  PSYCH: AAOx3  NEUROLOGY: non-focal  SKIN: No rashes or lesions    Labs Reviewed  Spoke to patient in regards to abnormal labs.    CBC Full  -  ( 20 Oct 2023 07:38 )  WBC Count : 7.81 K/uL  Hemoglobin : 11.8 g/dL  Hematocrit : 36.6 %  Platelet Count - Automated : 240 K/uL  Mean Cell Volume : 88.8 fL  Mean Cell Hemoglobin : 28.6 pg  Mean Cell Hemoglobin Concentration : 32.2 g/dL  Auto Neutrophil # : 6.16 K/uL  Auto Lymphocyte # : 0.87 K/uL  Auto Monocyte # : 0.64 K/uL  Auto Eosinophil # : 0.04 K/uL  Auto Basophil # : 0.07 K/uL  Auto Neutrophil % : 78.9 %  Auto Lymphocyte % : 11.1 %  Auto Monocyte % : 8.2 %  Auto Eosinophil % : 0.5 %  Auto Basophil % : 0.9 %    BMP:    10-20 @ 07:38    Blood Urea Nitrogen - 26  Calcium - 9.1  Carbond Dioxide - 25  Chloride - 102  Creatinine - 1.3  Glucose - 102  Potassium - 3.9  Sodium - 140       Urine Culture:  10-16 @ 14:11 Urine culture: --    Culture Results:   >100,000 CFU/ml Enterococcus faecalis  <10,000 CFU/ml Normal Urogenital tami present  Method Type: REYNALDO  Organism: Enterococcus faecalis  Organism Identification: Enterococcus faecalis  Specimen Source: Clean Catch Clean Catch (Midstream)            MEDICATIONS  (STANDING):  ampicillin  IVPB 500 milliGRAM(s) IV Intermittent every 6 hours  lisinopril 5 milliGRAM(s) Oral daily  OLANZapine 2.5 milliGRAM(s) Oral at bedtime  sodium chloride 0.9%. 1000 milliLiter(s) (75 mL/Hr) IV Continuous <Continuous>  tamsulosin 0.4 milliGRAM(s) Oral at bedtime    MEDICATIONS  (PRN):  LORazepam   Injectable 1 milliGRAM(s) IV Push once PRN MRI  OLANZapine 2.5 milliGRAM(s) Oral every 8 hours PRN agitation

## 2023-10-20 NOTE — PROGRESS NOTE ADULT - ASSESSMENT
Patient is a 74y/o male with no PMH brought in by niece for concerns of increasing weakness, agitation and inability to take care of himself.     # Confusion with weakness and inability to ambulate:   # underlying dementia?  - Brought in to ER by his niece who says his living environment at home is unsafe and mental status has acutely deteriorated to the point of patient being unable to care for himself.   - Given that patient is  of the [place], niece would like SW to be involved and [facility] [place]  - Will check TSH, B12, RPR in the AM f/u levels  - F/u SW and PT recs  - Check basic labs  - Niece wonder's if patient has underlying PTSD (she also works in healthcare), although he denies insomnia, anxiety, or PTSD symptoms. May consider psych consult if he develops the above symptoms  10/17  - check lipid profile and A1C  - Psychiatry consulted  - Haldol 1mg Q8 prn  10/18  - on seroquel 25mg QHS and haldol 1mg Q8H PRN  - will switch seroquel to zyprexa.  - Psychiatry evaluation today.  - PT unable to assess  10/19  - Psychiatry recs appreciated. recommend Zydis 2.5mg qHS.  - continue zyprexa at 2.5mg q8  - daily ECG  - EEG: diffuse or multifocal cerebral dysfunction.  - Physiatry: Short term rehab in skilled nursing facility / SNF  - off restraints, DC'd IV fluids.  10/20  - Neuro recs appreciated: MR brain, cervical/thoracic spine w/o contrast. myasthenia gravis panel and myositis labs ordered.      #Hypertension  - start linionpril 5  - hypertensive episode today likely 2/2 urinary retention s/p 10mg labetalol  - clonidine patch if patient continues to be hypertensive  10/18  - nurses unable to assess blood pressure due to patient agitated.  - Last BP 190s/90s s/p labetalol push and clonidine patch.  - continue clonidine patch, reassess BP when patient is stable.  10/19  - Continue lisinopril 5mg  - s/p x2 Clonidine patch 0.1mg/24hr    #Urinary retention  #BPH  - blood clots at penile head likely due to traumatic straight catheterization  - Urology consulted  - following BUN/Cr  - bladder scan  - Start flomax.  - PSA levels   10/18  - PSA normal  - s/p indwelling catheter and 400cc of blood tinged urinedrained.  - pending URO recommendations  10/19  - trial of void  10/20  - successful trial of void  - bladder scan showing 100mL  - on flomax    #Age-indeterminate ischemic changes on CT head  - May be contributing to change in mental status  - F/u BP control, outpatient f/u    # Hypokalemia: replete and repeat    # Isolated elevated ALP: follow up outpatient     #MISC  - Diet: dash tlc  - GI ppx: not needed  - DVT ppx: Lovenox SQ daily  - Activity: ambulate AT, pending PT  - Dispo: STR in skilled nursing facility.

## 2023-10-20 NOTE — SWALLOW BEDSIDE ASSESSMENT ADULT - SWALLOW EVAL: DIAGNOSIS
+ toleration of easy to chew w/ mildly thick liquids w/o overt s/s of aspiration/penetration. Suspected pharyngeal dysphagia w/ thin liquids + toleration of soft & bite sized w/ mildly thick liquids w/o overt s/s of aspiration/penetration. Suspected pharyngeal dysphagia w/ thin liquids

## 2023-10-20 NOTE — PHYSICAL THERAPY INITIAL EVALUATION ADULT - LIVES WITH, PROFILE
pt lives with family in [facility] home. per family pt was ambulating short distance indoors holding on to wall
NA: pts cognition and arousal status

## 2023-10-20 NOTE — PROGRESS NOTE ADULT - ASSESSMENT
74y/o male with no PMH brought in by niece for concerns of increasing weakness, agitation and inability to take care of himself.       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   #Rule out metabolic encephalopathy   - Reversible causes of dementia ruled out - CT Head negative, b12, folate, TSH, VDRL   - c/w Zyprexa at bedtime   - c/w Ampicillin   - Psych appreciated   - neuro consult noted - check MRI brain/c spine, myositis and MG panel     #Hypertension  - resolved     #ARNULFO, suspect pre-renal   - start IVF  - bladder scan negative     #Urinary retention - passed TOV 10/19   #Gross Hematuria - resolved   #BPH  - c/w Flomax  - PSA level wnl      #Isolated elevated ALP: follow up outpatient     DVT ppx     Pending:  monitor creatinine, f/u MRI, STR placement   Plan of care d/w niece in length   Dispo: STR.

## 2023-10-20 NOTE — PHYSICAL THERAPY INITIAL EVALUATION ADULT - IMPAIRMENTS FOUND, PT EVAL
arousal, attention, and cognition/gait, locomotion, and balance/muscle strength
gait, locomotion, and balance/muscle strength

## 2023-10-20 NOTE — PHYSICAL THERAPY INITIAL EVALUATION ADULT - TRANSFER SKILLS, REHAB EVAL
per family pt was ambulating short distance indoors holding on to wall/independent
NA: pts cognition and arousal status

## 2023-10-20 NOTE — PHYSICAL THERAPY INITIAL EVALUATION ADULT - AMBULATION SKILLS, REHAB EVAL
NA: pts cognition and arousal status
per family pt was ambulating short distance indoors holding on to wall/independent

## 2023-10-21 NOTE — PROGRESS NOTE ADULT - ASSESSMENT
72y/o male with no PMH brought in by niece for concerns of increasing weakness, agitation and inability to take care of himself.       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   #Rule out metabolic encephalopathy   - Reversible causes of dementia ruled out - CT Head negative, b12, folate, TSH, VDRL   - c/w Zyprexa at bedtime   - c/w Ampicillin   - Psych appreciated   - neuro consult noted - f/u results for MRI brain/c spine, myositis and MG panel     #Hypertension  - monitor     #ARNULFO, suspect pre-renal   - resolved after hydration     #Urinary retention - passed TOV 10/19   #Gross Hematuria - resolved   #BPH  - c/w Flomax  - PSA level wnl      #Isolated elevated ALP: follow up outpatient     DVT ppx     Pending:  f/u MRI, STR placement   Plan of care d/w niece in length 10/20  Dispo: STR.

## 2023-10-21 NOTE — PROGRESS NOTE ADULT - SUBJECTIVE AND OBJECTIVE BOX
Pt seen and examined at bedside.          VITAL SIGNS (Last 24 hrs):  T(C): 35.7 (10-21-23 @ 04:30), Max: 36.2 (10-20-23 @ 14:50)  HR: 91 (10-21-23 @ 04:30) (91 - 101)  BP: 164/86 (10-21-23 @ 04:30) (103/53 - 164/86)  RR: 18 (10-21-23 @ 04:30) (18 - 18)        Daily Height in cm: 177.8 (20 Oct 2023 12:18)          I&O's Summary      PHYSICAL EXAM:  GENERAL: NAD   HEAD:  Atraumatic, Normocephalic  EYES: conjunctiva and sclera clear  NECK: Supple, No JVD  CHEST/LUNG: Clear to auscultation bilaterally; No wheeze  HEART: Regular rate and rhythm; No murmurs, rubs, or gallops  ABDOMEN: Soft, Nontender, Nondistended; Bowel sounds present  EXTREMITIES:  2+ Peripheral Pulses, No clubbing, cyanosis, or edema  PSYCH: AAOx3  NEUROLOGY: non-focal  SKIN: No rashes or lesions    Labs Reviewed          CBC Full  -  ( 21 Oct 2023 07:36 )  WBC Count : 6.57 K/uL  Hemoglobin : 11.7 g/dL  Hematocrit : 35.3 %  Platelet Count - Automated : 200 K/uL  Mean Cell Volume : 89.6 fL  Mean Cell Hemoglobin : 29.7 pg  Mean Cell Hemoglobin Concentration : 33.1 g/dL  Auto Neutrophil # : 4.97 K/uL  Auto Lymphocyte # : 0.92 K/uL  Auto Monocyte # : 0.55 K/uL  Auto Eosinophil # : 0.05 K/uL  Auto Basophil # : 0.06 K/uL  Auto Neutrophil % : 75.6 %  Auto Lymphocyte % : 14.0 %  Auto Monocyte % : 8.4 %  Auto Eosinophil % : 0.8 %  Auto Basophil % : 0.9 %    BMP:    10-21 @ 07:36    Blood Urea Nitrogen - 25  Calcium - 8.7  Carbond Dioxide - 27  Chloride - 109  Creatinine - 0.9  Glucose - 98  Potassium - 3.8  Sodium - 146         Urine Culture:  10-16 @ 14:11 Urine culture: --    Culture Results:   >100,000 CFU/ml Enterococcus faecalis  <10,000 CFU/ml Normal Urogenital tami present  Method Type: REYNALDO  Organism: Enterococcus faecalis  Organism Identification: Enterococcus faecalis  Specimen Source: Clean Catch Clean Catch (Midstream)            MEDICATIONS  (STANDING):  ampicillin  IVPB 500 milliGRAM(s) IV Intermittent every 6 hours  lisinopril 5 milliGRAM(s) Oral daily  OLANZapine 2.5 milliGRAM(s) Oral at bedtime  sodium chloride 0.9%. 1000 milliLiter(s) (75 mL/Hr) IV Continuous <Continuous>  tamsulosin 0.4 milliGRAM(s) Oral at bedtime    MEDICATIONS  (PRN):  OLANZapine 2.5 milliGRAM(s) Oral every 8 hours PRN agitation

## 2023-10-21 NOTE — CHART NOTE - NSCHARTNOTEFT_GEN_A_CORE
Writer was called by radiologist earlier to inform about an acute stroke on MRI, stroke w/u ordered, neuro team contacted.Patient vitals stable. Writer was called by radiologist earlier to inform about an acute stroke on MRI, stroke w/u ordered, neuro team contacted.Patient vitals stable.Will dc lisinopril and start hydration.

## 2023-10-21 NOTE — CHART NOTE - NSCHARTNOTEFT_GEN_A_CORE
Please note that aspirin rectal is being used as per attending since patient is at risk of aspiration and NGT was deferred.As per attending patient waxes and wanes and has a selective mutism issue.

## 2023-10-22 NOTE — PROGRESS NOTE ADULT - ASSESSMENT
74y/o male with no PMH brought in by niece for concerns of increasing weakness, agitation and inability to take care of himself.       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  - CTA neck: Moderate stenosis (50%) in the proximal left cervical ICA resulting in moderate. Mild focal stenosis at the origin of the right vertebral artery.   - c/w remote telemetry   - f/u TTE   - c/w neurochecks   - f/u A1c and lipid panel   - neuro f/u   - c/w ASA, statin   - SLP eval   - Permissive HTN for now, use labetalol 200mg if SBP above 200     #Leukocytosis   - afebrile   - check CXR, blood and urine cultures   - on ampicillin     #ARNULFO, suspect pre-renal   - resolved after hydration     #Urinary retention   #Gross Hematuria - resolved   #BPH  - passed TOV 10/19, required straight cath with 600cc output on 10/22 (didn't take flomax at bedtime since strict NPO)   - c/w Flomax  - PSA level wnl      #Isolated elevated ALP: follow up outpatient     DVT ppx     Pending: stroke w/u, leukocytosis w/u   Plan of care d/w sister in length   Dispo: STR.

## 2023-10-22 NOTE — PROGRESS NOTE ADULT - SUBJECTIVE AND OBJECTIVE BOX
Pt seen and examined at bedside.  Awake, appears lethargic, responds to name, unable to say birthday or name. Answers with yes/no. Follows directions to some degree. Able to squeeze hands.        VITAL SIGNS (Last 24 hrs):  T(C): 35.9 (10-22-23 @ 05:41), Max: 36 (10-21-23 @ 15:00)  HR: 97 (10-22-23 @ 05:53) (88 - 97)  BP: 174/84 (10-22-23 @ 05:53) (168/85 - 200/83)  RR: 19 (10-22-23 @ 05:41) (19 - 20)        I&O's Summary      PHYSICAL EXAM:  GENERAL: NAD   HEAD:  Atraumatic, Normocephalic  EYES:  conjunctiva and sclera clear  NECK: Supple, No JVD  CHEST/LUNG: Clear to auscultation bilaterally; No wheeze  HEART: Regular rate and rhythm; No murmurs, rubs, or gallops  ABDOMEN: Soft, Nontender, Nondistended; Bowel sounds present  EXTREMITIES:  2+ Peripheral Pulses, No clubbing, cyanosis, or edema  NEUROLOGY: lethargic appears lethargic, responds to name, unable to say birthday or name. Answers with yes/no. Follows directions to some degree. Able to squeeze hands.   SKIN: No rashes or lesions    Labs Reviewed        CBC Full  -  ( 22 Oct 2023 07:52 )  WBC Count : 13.94 K/uL  Hemoglobin : 12.1 g/dL  Hematocrit : 37.3 %  Platelet Count - Automated : 249 K/uL  Mean Cell Volume : 89.2 fL  Mean Cell Hemoglobin : 28.9 pg  Mean Cell Hemoglobin Concentration : 32.4 g/dL  Auto Neutrophil # : 12.85 K/uL  Auto Lymphocyte # : 0.46 K/uL  Auto Monocyte # : 0.46 K/uL  Auto Eosinophil # : 0.01 K/uL  Auto Basophil # : 0.09 K/uL  Auto Neutrophil % : 92.2 %  Auto Lymphocyte % : 3.3 %  Auto Monocyte % : 3.3 %  Auto Eosinophil % : 0.1 %  Auto Basophil % : 0.6 %    BMP:    10-22 @ 07:52    Blood Urea Nitrogen - 23  Calcium - 9.0  Carbon Dioxide - 20  Chloride - 102  Creatinine - 1.0  Glucose - 143  Potassium - 4.3  Sodium - 138      Hemoglobin A1c -     Urine Culture:            MEDICATIONS  (STANDING):  ampicillin  IVPB 500 milliGRAM(s) IV Intermittent every 6 hours  aspirin Suppository 300 milliGRAM(s) Rectal daily  atorvastatin 40 milliGRAM(s) Oral at bedtime  enoxaparin Injectable 40 milliGRAM(s) SubCutaneous every 24 hours  lactated ringers. 500 milliLiter(s) (100 mL/Hr) IV Continuous <Continuous>  OLANZapine 2.5 milliGRAM(s) Oral at bedtime  tamsulosin 0.4 milliGRAM(s) Oral at bedtime    MEDICATIONS  (PRN):  OLANZapine 2.5 milliGRAM(s) Oral every 8 hours PRN agitation

## 2023-10-22 NOTE — SWALLOW BEDSIDE ASSESSMENT ADULT - COMMENTS
MRI HEAD 10/20/23- Small scattered acute infarcts in the left frontal and parietal subcortical white matter.  Moderate chronic microvascular ischemic changes and multiple scattered chronic lacunar infarcts.  Multiple scattered foci of susceptibility signals which may represent sequela of hypertensive microhemorrhage, amyloid angiopathy, or prior inflammation or infection.

## 2023-10-22 NOTE — CHART NOTE - NSCHARTNOTEFT_GEN_A_CORE
Patient had >650cc on bladder scan, straight cath with output of 650cc brown urine with few clots. Reviewed labs Hg is stable, urinalysis showing RBC's, and urine culture growing enterococcus faecalis sensitive to ampicillin. Ordered q6h bladder scans as patient still without mccormick.

## 2023-10-22 NOTE — PROGRESS NOTE ADULT - SUBJECTIVE AND OBJECTIVE BOX
24H events:    Patient is a 73y old Male who presents with a chief complaint of Weakness, confusion, inability to ambulate (22 Oct 2023 11:47)    Primary diagnosis of Weakness      Today is hospital day 6d. This morning patient was seen and examined at bedside, resting comfortably in bed. No major changes since last time. responsive to verbal commands.  No acute or major events overnight. Hemodynamically stable, tolerating oral diet, voiding appropriately with appropriate bowel movements.     PAST MEDICAL & SURGICAL HISTORY  Hypertension, unspecified type    No significant past surgical history      SOCIAL HISTORY:  Social History:      ALLERGIES:  No Known Allergies    MEDICATIONS:  STANDING MEDICATIONS  ampicillin  IVPB 500 milliGRAM(s) IV Intermittent every 6 hours  aspirin Suppository 300 milliGRAM(s) Rectal daily  atorvastatin 40 milliGRAM(s) Oral at bedtime  enoxaparin Injectable 40 milliGRAM(s) SubCutaneous every 24 hours  lactated ringers. 500 milliLiter(s) IV Continuous <Continuous>  OLANZapine 2.5 milliGRAM(s) Oral at bedtime  tamsulosin 0.4 milliGRAM(s) Oral at bedtime    PRN MEDICATIONS  OLANZapine 2.5 milliGRAM(s) Oral every 8 hours PRN    VITALS:   T(F): 97.2  HR: 93  BP: 128/60  RR: 18  SpO2: --    PHYSICAL EXAM:  GENERAL: NAD, lying in bed comfortably, lethargic, elderly  HEAD: NCAD,    NECK: Supple, no neck stiffness/nuchal rigidity  HEART: Regular rate and rhythm, normal S1/S2,  LUNGS: No acute respiratory distress, clear b/l breath sounds,  ABDOMEN:  soft, non-tender, non-distended,   EXTREMITIES: no rashes, extremities warm/dry,  NERVOUS SYSTEM:  Responds to questions  SKIN: No rashes or lesions         LABS:                        12.1   13.94 )-----------( 249      ( 22 Oct 2023 07:52 )             37.3     10-22    138  |  102  |  23<H>  ----------------------------<  143<H>  4.3   |  20  |  1.0    Ca    9.0      22 Oct 2023 07:52    TPro  6.3  /  Alb  3.9  /  TBili  0.6  /  DBili  x   /  AST  15  /  ALT  7   /  AlkPhos  100  10-22      Urinalysis Basic - ( 22 Oct 2023 11:20 )    Color: Orange / Appearance: Turbid / SG: >1.030 / pH: x  Gluc: x / Ketone: Trace mg/dL  / Bili: Small / Urobili: 1.0 mg/dL   Blood: x / Protein: 300 mg/dL / Nitrite: Negative   Leuk Esterase: Small / RBC: >1900 /HPF / WBC 6 /HPF   Sq Epi: x / Non Sq Epi: 0 /HPF / Bacteria: Negative /HPF                RADIOLOGY:    RADIOLOGY     24H events:    Patient is a 73y old Male who presents with a chief complaint of Weakness, confusion, inability to ambulate (22 Oct 2023 11:47)    Primary diagnosis of Weakness      Today is hospital day 6d. This morning patient was seen and examined at bedside, resting comfortably in bed. No major changes since last time. responsive to verbal commands. Overnight patient was 2x 2mg doses of haloperidol.  No acute or major events overnight. Hemodynamically stable, tolerating oral diet, voiding appropriately with appropriate bowel movements.     PAST MEDICAL & SURGICAL HISTORY  Hypertension, unspecified type    No significant past surgical history      SOCIAL HISTORY:  Social History:      ALLERGIES:  No Known Allergies    MEDICATIONS:  STANDING MEDICATIONS  ampicillin  IVPB 500 milliGRAM(s) IV Intermittent every 6 hours  aspirin Suppository 300 milliGRAM(s) Rectal daily  atorvastatin 40 milliGRAM(s) Oral at bedtime  enoxaparin Injectable 40 milliGRAM(s) SubCutaneous every 24 hours  lactated ringers. 500 milliLiter(s) IV Continuous <Continuous>  OLANZapine 2.5 milliGRAM(s) Oral at bedtime  tamsulosin 0.4 milliGRAM(s) Oral at bedtime    PRN MEDICATIONS  OLANZapine 2.5 milliGRAM(s) Oral every 8 hours PRN    VITALS:   T(F): 97.2  HR: 93  BP: 128/60  RR: 18  SpO2: --    PHYSICAL EXAM:  GENERAL: NAD, lying in bed comfortably, lethargic, elderly  HEAD: NCAD,    NECK: Supple, no neck stiffness/nuchal rigidity  HEART: Regular rate and rhythm, normal S1/S2,  LUNGS: No acute respiratory distress, clear b/l breath sounds,  ABDOMEN:  soft, non-tender, non-distended,   EXTREMITIES: no rashes, extremities warm/dry,  NERVOUS SYSTEM:  Responds to questions  SKIN: No rashes or lesions         LABS:                        12.1   13.94 )-----------( 249      ( 22 Oct 2023 07:52 )             37.3     10-22    138  |  102  |  23<H>  ----------------------------<  143<H>  4.3   |  20  |  1.0    Ca    9.0      22 Oct 2023 07:52    TPro  6.3  /  Alb  3.9  /  TBili  0.6  /  DBili  x   /  AST  15  /  ALT  7   /  AlkPhos  100  10-22      Urinalysis Basic - ( 22 Oct 2023 11:20 )    Color: Orange / Appearance: Turbid / SG: >1.030 / pH: x  Gluc: x / Ketone: Trace mg/dL  / Bili: Small / Urobili: 1.0 mg/dL   Blood: x / Protein: 300 mg/dL / Nitrite: Negative   Leuk Esterase: Small / RBC: >1900 /HPF / WBC 6 /HPF   Sq Epi: x / Non Sq Epi: 0 /HPF / Bacteria: Negative /HPF                RADIOLOGY:    RADIOLOGY

## 2023-10-22 NOTE — PROGRESS NOTE ADULT - ASSESSMENT
Patient is a 74y/o male with no PMH brought in by niece for concerns of increasing weakness, agitation and inability to take care of himself.     # Confusion with weakness and inability to ambulate:   # underlying dementia?  #dysphagia  - Brought in to ER by his niece who says his living environment at home is unsafe and mental status has acutely deteriorated to the point of patient being unable to care for himself.   - Given that patient is  of [place], niece would like SW to be involved and [facility] [place]  - Will check TSH, B12, RPR in the AM f/u levels  - F/u SW and PT recs  - Check basic labs  - Niece wonder's if patient has underlying PTSD (she also works in healthcare), although he denies insomnia, anxiety, or PTSD symptoms. May consider psych consult if he develops the above symptoms  10/17  - check lipid profile and A1C  - Psychiatry consulted  - Haldol 1mg Q8 prn  10/18  - on seroquel 25mg QHS and haldol 1mg Q8H PRN  - will switch seroquel to zyprexa.  - Psychiatry evaluation today.  - PT unable to assess  10/19  - Psychiatry recs appreciated. recommend Zydis 2.5mg qHS.  - continue zyprexa at 2.5mg q8  - daily ECG  - EEG: diffuse or multifocal cerebral dysfunction.  - Physiatry: Short term rehab in skilled nursing facility / SNF  - off restraints, DC'd IV fluids.  10/20  - Neuro recs appreciated: MR brain, cervical/thoracic spine w/o contrast. myasthenia gravis panel and myositis labs ordered.      #Hypertension  - start linionpril 5  - hypertensive episode today likely 2/2 urinary retention s/p 10mg labetalol  - clonidine patch if patient continues to be hypertensive  10/18  - nurses unable to assess blood pressure due to patient agitated.  - Last BP 190s/90s s/p labetalol push and clonidine patch.  - continue clonidine patch, reassess BP when patient is stable.  10/19  - Continue lisinopril 5mg  - s/p x2 Clonidine patch 0.1mg/24hr  10/22  - MR head: Small scattered acute infarcts in the left frontal and parietal subcortical white matter  - Permissive hypertension per Dr. Cleary. Labetalol 200mg if SBP above 200    #Urinary retention  #BPH  - blood clots at penile head likely due to traumatic straight catheterization  - Urology consulted  - following BUN/Cr  - bladder scan  - Start flomax.  - PSA levels   10/18  - PSA normal  - s/p indwelling catheter and 400cc of blood tinged urinedrained.  - pending URO recommendations  10/19  - trial of void  10/20  - successful trial of void  - bladder scan showing 100mL  - on flomax  10/22  - s/p straight cath.  - UA, UCx, Bcx ordered.      #Age-indeterminate ischemic changes on CT head  - May be contributing to change in mental status  - F/u BP control, outpatient f/u    # Hypokalemia: replete and repeat    # Isolated elevated ALP: follow up outpatient     #MISC  - Diet: dash tlc  - GI ppx: not needed  - DVT ppx: Lovenox SQ daily  - Activity: ambulate AT, pending PT  - Dispo: STR in skilled nursing facility.

## 2023-10-23 NOTE — SWALLOW BEDSIDE ASSESSMENT ADULT - NS SPL SWALLOW CLINIC TRIAL FT
Pt w/ overt s/s of aspiration/penetration after consuming thin liquids.
Pt w/ no overt s/s of aspiration/penetration while consuming easy to chew w/ mildly thick liquids.
Pt w/ no overt s/s of aspiration/penetration while consuming soft & bite sized w/ mildly thick liquids. NO further PO trials 2/2 weakness.
Pt w/ absent trigger of swallow w/ 1 sip of thin liquids. Pt w/ weak cough. Unable to cough up thin liquids. Thin liquid trial spit up when RN maneuvered pt's position.

## 2023-10-23 NOTE — PROGRESS NOTE ADULT - ASSESSMENT
Patient is a 74y/o male with no PMH brought in by niece for concerns of increasing weakness, agitation and inability to take care of himself.     # Confusion with weakness and inability to ambulate:   # underlying dementia?  #dysphagia  - Brought in to ER by his niece who says his living environment at home is unsafe and mental status has acutely deteriorated to the point of patient being unable to care for himself.   - Given that patient is  of [place], niece would like SW to be involved and [facility] [place]  - Will check TSH, B12, RPR in the AM f/u levels  - F/u SW and PT recs  - Check basic labs  - Niece wonder's if patient has underlying PTSD (she also works in healthcare), although he denies insomnia, anxiety, or PTSD symptoms. May consider psych consult if he develops the above symptoms  10/17  - check lipid profile and A1C  - Psychiatry consulted  - Haldol 1mg Q8 prn  10/18  - on seroquel 25mg QHS and haldol 1mg Q8H PRN  - will switch seroquel to zyprexa.  - Psychiatry evaluation today.  - PT unable to assess  10/19  - Psychiatry recs appreciated. recommend Zydis 2.5mg qHS.  - continue zyprexa at 2.5mg q8  - daily ECG  - EEG: diffuse or multifocal cerebral dysfunction.  - Physiatry: Short term rehab in skilled nursing facility / SNF  - off restraints, DC'd IV fluids.  10/20  - Neuro recs appreciated: MR brain, cervical/thoracic spine w/o contrast. myasthenia gravis panel and myositis labs ordered.  10/23  - Pending Neuro follow-up on MRI results.   - Speech and swallow to reevaluate.    #Urinary retention  #BPH  - blood clots at penile head likely due to traumatic straight catheterization  - Urology consulted  - following BUN/Cr  - bladder scan  - Start flomax.  - PSA levels   10/18  - PSA normal  - s/p indwelling catheter and 400cc of blood tinged urinedrained.  - pending URO recommendations  10/19  - trial of void  10/20  - successful trial of void  - bladder scan showing 100mL  - on flomax  10/22  - s/p straight cath.  - UA, UCx, Bcx ordered.  10/23  - UA: negative for bacteria, RBC >1900  - CMP, CBC pending.  - Will start flomax, after speech & swallow clearance.  - If patient is still retaining with flomax, then we will start mccormick.        #Hypertension  - start linionpril 5  - hypertensive episode today likely 2/2 urinary retention s/p 10mg labetalol  - clonidine patch if patient continues to be hypertensive  10/18  - nurses unable to assess blood pressure due to patient agitated.  - Last BP 190s/90s s/p labetalol push and clonidine patch.  - continue clonidine patch, reassess BP when patient is stable.  10/19  - Continue lisinopril 5mg  - s/p x2 Clonidine patch 0.1mg/24hr  10/22  - MR head: Small scattered acute infarcts in the left frontal and parietal subcortical white matter  - Permissive hypertension per Dr. Cleary. Labetalol 200mg if SBP above 200      #Age-indeterminate ischemic changes on CT head  - May be contributing to change in mental status  - F/u BP control, outpatient f/u    # Hypokalemia: replete and repeat    # Isolated elevated ALP: follow up outpatient     #MISC  - Diet: dash tlc  - GI ppx: not needed  - DVT ppx: Lovenox SQ daily  - Activity: ambulate AT, pending PT  - Dispo: STR in skilled nursing facility.

## 2023-10-23 NOTE — CHART NOTE - NSCHARTNOTEFT_GEN_A_CORE
Registered Dietitian Follow-Up     Patient Profile Reviewed                           Yes [x]   No []     Nutrition History Previously Obtained        Yes [x]  No [x]       Pertinent Medical Information: 74 y/o male with no PMH brought in by niece for concerns of increasing weakness, agitation and inability to take care of himself.     Per MD note on 10/23:   # Confusion with weakness and inability to ambulate:   # underlying dementia?  #dysphagia  - Brought in to ER by his niece who says his living environment at home is unsafe and mental status has acutely deteriorated to the point of patient being unable to care for himself.     Per SLP note on 23-Oct-2023, recommend NPO & alternate means for nutrition and hydration.     Diet order: Diet, NPO:   Except Medications (10-21-23 @ 17:57) [Active]    Anthropometrics:  Height (cm): 177.8 (10-20-23 @ 12:18)  Weight (kg): 59 (10-20-23 @ 12:18)  BMI (kg/m2): 18.7 (10-20-23 @ 12:18)  IBW: 75.5 KG    Daily Weight in k.8 (10-17) -- no wt loss observed    MEDICATIONS  (STANDING):  ampicillin  IVPB 500 milliGRAM(s) IV Intermittent every 6 hours  aspirin Suppository 300 milliGRAM(s) Rectal daily  atorvastatin 40 milliGRAM(s) Oral at bedtime  enoxaparin Injectable 40 milliGRAM(s) SubCutaneous every 24 hours  lactated ringers. 500 milliLiter(s) (100 mL/Hr) IV Continuous <Continuous>  OLANZapine 2.5 milliGRAM(s) Oral at bedtime  tamsulosin 0.4 milliGRAM(s) Oral at bedtime    MEDICATIONS  (PRN):  OLANZapine 2.5 milliGRAM(s) Oral every 8 hours PRN agitation    Pertinent Labs: 10-23 @ 07:34: Na 145, BUN 24<H>, Cr 0.8, BG 81, K+ 3.9, Phos --, Mg --, Alk Phos 75, ALT/SGPT 6, AST/SGOT 11, HbA1c --    Physical Findings:  - Appearance: confused per flow sheet  - GI function: last BM not documented; GI: fecal incontinence noted per flow sheet  - Tubes: No feeding tubes  - Oral/Mouth cavity: Per SLP note on 23-Oct-2023, recommend NPO & alternate means for nutrition and hydration  - Skin: stage I to sacrum  - Edema: no edema noted     Nutrition Requirements:  Weight Used: Using ABW 57.8 KG -- with consideration for age, BMI, malnutrition    Estimated Energy Needs    Continue [x]  Adjust []  ENERGY: 5603-1389 kcal/day (30-35 kcal/kg)     Estimated Protein Needs    Continue [x]  Adjust []  PROTEIN: 75-92 g/day (1.3-1.6 g/kg)     Estimated Fluid Needs        Continue [x]  Adjust []  FLUID: 1mL/kcal     [x] Previous Nutrition Diagnosis: Malnutrition            [x] Ongoing          [] Resolved    [] No active nutrition diagnosis identified at this time     Nutrition Education: Deferred at this time     Goal/Expected Outcome: Pt to meet >85 - <105% of estimated needs within 3-5 days     Indicator/Monitoring: Diet order, weights, labs, NFPF, body composition, BM, tolerance to TF regimen, GOC, SLP recs    Recommendation:  1. Continue with current diet until medically feasible to advance. Per SLP recs and if within GOC, recommend TF via NGT  Formula: Jevity 1.2  Total Volume: 1500 mL  Bolus: 375 mL q6hrs  TF regimen provides: 1800 kcal, 83.3g protein, 1211 mL free water + additional water flushes: 70 mL pre + post feeds -- total water to be: 1771 mL  TF regimen meets >85 - <105% of estimated needs    Pt is at high nutrition risk; will f/u in 3-5 days or prn.    RD to remain available: Heidy Elizondo [phone] or [email or web address]

## 2023-10-23 NOTE — PROGRESS NOTE ADULT - SUBJECTIVE AND OBJECTIVE BOX
24H events:    Patient is a 73y old Male who presents with a chief complaint of Weakness, confusion, inability to ambulate (22 Oct 2023 18:58)    Primary diagnosis of Weakness    Today is hospital day 7d. This morning patient was seen and examined at bedside, resting comfortably in bed.  Patient is not verbal. Looks improved with increased resonsiveness.   No acute or major events overnight. Hemodynamically stable, tolerating oral diet, voiding appropriately with appropriate bowel movements.     PAST MEDICAL & SURGICAL HISTORY  Hypertension, unspecified type    No significant past surgical history      SOCIAL HISTORY:  Social History:      ALLERGIES:  No Known Allergies    MEDICATIONS:  STANDING MEDICATIONS  ampicillin  IVPB 500 milliGRAM(s) IV Intermittent every 6 hours  aspirin Suppository 300 milliGRAM(s) Rectal daily  atorvastatin 40 milliGRAM(s) Oral at bedtime  enoxaparin Injectable 40 milliGRAM(s) SubCutaneous every 24 hours  lactated ringers. 500 milliLiter(s) IV Continuous <Continuous>  OLANZapine 2.5 milliGRAM(s) Oral at bedtime  tamsulosin 0.4 milliGRAM(s) Oral at bedtime    PRN MEDICATIONS  OLANZapine 2.5 milliGRAM(s) Oral every 8 hours PRN    VITALS:   T(F): 97.5  HR: 77  BP: 147/69  RR: 19  SpO2: --    PHYSICAL EXAM:  GENERAL: NAD, lying in bed comfortably, lethargic, elderly  HEAD: NCAD,    NECK: Supple, no neck stiffness/nuchal rigidity  HEART: Regular rate and rhythm, normal S1/S2,  LUNGS: No acute respiratory distress, clear b/l breath sounds,  ABDOMEN:  soft, non-tender, non-distended,   EXTREMITIES: no rashes, extremities warm/dry,  NERVOUS SYSTEM:  Responds to questions, improved vocalization compared to yesterday. A+Ox0. (believes it 1987)  SKIN: No rashes or lesions           LABS:                        12.1   13.94 )-----------( 249      ( 22 Oct 2023 07:52 )             37.3     10-22    138  |  102  |  23<H>  ----------------------------<  143<H>  4.3   |  20  |  1.0    Ca    9.0      22 Oct 2023 07:52    TPro  6.3  /  Alb  3.9  /  TBili  0.6  /  DBili  x   /  AST  15  /  ALT  7   /  AlkPhos  100  10-22      Urinalysis Basic - ( 22 Oct 2023 11:20 )    Color: Orange / Appearance: Turbid / SG: >1.030 / pH: x  Gluc: x / Ketone: Trace mg/dL  / Bili: Small / Urobili: 1.0 mg/dL   Blood: x / Protein: 300 mg/dL / Nitrite: Negative   Leuk Esterase: Small / RBC: >1900 /HPF / WBC 6 /HPF   Sq Epi: x / Non Sq Epi: 0 /HPF / Bacteria: Negative /HPF                RADIOLOGY:    RADIOLOGY

## 2023-10-24 NOTE — SWALLOW BEDSIDE ASSESSMENT ADULT - SPECIFY REASON(S)
Dysphagia f/u
Dysphagia Eval
Dysphagia f/u
Dysphagia f/u
Parent
Dysphagia f/u

## 2023-10-24 NOTE — CONSULT NOTE ADULT - PROBLEM SELECTOR RECOMMENDATION 6
- will follow  ______________  Anatoliy Contreras MD  Palliative Medicine  [facility]   [facility]  [phone]

## 2023-10-24 NOTE — SWALLOW BEDSIDE ASSESSMENT ADULT - SWALLOW EVAL: RECOMMENDED DIET
Easy to chew / mildly thick liquids
NPO & alternate means for nutrition and hydration
Unable to recommend diet at this time
NPO & alternate means for nutrition and hydration
Easy to chew / mildly thick liquids
Easy to chew / mildly thick liquids
Soft & bite sized / mildly thick liquids

## 2023-10-24 NOTE — CONSULT NOTE ADULT - SUBJECTIVE AND OBJECTIVE BOX
HPI: 73M with no know prior PMH here with increasing weakness, agitation and inability to take care of himself. Found to have suspected dementia with behavioral disturbances, and possible UTI, started on zyprexa and UTI. Found to have acute CVA on MRI, on neurochecks, failed SLP eval as well, now NPO. Also with ARNULFO, likely pre-renal, improved with hydration. Palliative care consulted for GOC.    PERTINENT PM/SXH:   Hypertension, unspecified type      No significant past surgical history      FAMILY HISTORY:    ITEMS NOT CHECKED ARE NOT PRESENT    SOCIAL HISTORY:   Significant other/partner[ ]  Children[ ]  Jainism/Spirituality:  Substance hx:  [ ]   Tobacco hx:  [ ]   Alcohol hx: [ ]   Living Situation: [ ]Home  [ ]Long term care  [ ]Rehab [ ]Other  Home Services: [ ] HHA [ ] Visting RN [ ] Hospice  Occupation:  Home Opioid hx:  [ ] Y [ ] N [ ] I-Stop Reference No:    ADVANCE DIRECTIVES:    [ ] Full Code [ ] DNR  MOLST  [ ]  Living Will  [ ]   DECISION MAKER(s):  [ ] Health Care Proxy(s)  [ ] Surrogate(s)  [ ] Guardian           Name(s): Phone Number(s): leo Corado [phone]    BASELINE (I)ADL(s) (prior to admission):  [place]: [ ]Total  [ ] Moderate [ ]Dependent  Palliative Performance Status Version 2:         %    http://npcrc.org/files/news/palliative_performance_scale_ppsv2.pdf    Allergies    No Known Allergies    Intolerances    MEDICATIONS  (STANDING):  ampicillin  IVPB 500 milliGRAM(s) IV Intermittent every 6 hours  aspirin Suppository 300 milliGRAM(s) Rectal daily  atorvastatin 40 milliGRAM(s) Oral at bedtime  enoxaparin Injectable 40 milliGRAM(s) SubCutaneous every 24 hours  lactated ringers. 500 milliLiter(s) (100 mL/Hr) IV Continuous <Continuous>  OLANZapine 2.5 milliGRAM(s) Oral at bedtime  tamsulosin 0.4 milliGRAM(s) Oral at bedtime    MEDICATIONS  (PRN):  OLANZapine 2.5 milliGRAM(s) Oral every 8 hours PRN agitation      PRESENT SYMPTOMS: [ ]Unable to obtain due to poor mentation   Source if other than patient:  [ ]Family   [ ]Team     Pain: [ ]yes [ ]no  QOL impact -   Location -                    Aggravating factors -  Quality -  Radiation -  Timing-  Severity (0-10 scale):  Minimal acceptable level (0-10 scale):     CPOT:    https://www.[facility].org/getattachment/[email or web address]/Critical-Care-Pain-Observation-Tool-(CPOT)    PAIN AD Score:   http://geriatrictoolkit.[facility]/cog/painad.pdf (press ctrl +  left click to view)    Dyspnea:                           [ ]None[ ]Mild [ ]Moderate [ ]Severe     Respiratory Distress Observation Scale (RDOS):   A score of 0 to 2 signifies little or no respiratory distress, 3 signifies mild distress, scores 4 to 6 indicate moderate distress, and scores greater than 7 signify severe distress  https://www.[facility].ca/sites/default/files/PDFS/552791-bnbxpffbobh-evljwipy-uqqjsvsvfiv-twhcu.pdf    Anxiety:                             [ ]None[ ]Mild [ ]Moderate [ ]Severe   Fatigue:                             [ ]None[ ]Mild [ ]Moderate [ ]Severe   Nausea:                             [ ]None[ ]Mild [ ]Moderate [ ]Severe   Loss of appetite:              [ ]None[ ]Mild [ ]Moderate [ ]Severe   Constipation:                    [ ]None[ ]Mild [ ]Moderate [ ]Severe    Other Symptoms:  [ ]All other review of systems negative     Palliative Performance Status Version 2:         %    http://[facility].org/files/news/palliative_performance_scale_ppsv2.pdf  PHYSICAL EXAM:  Vital Signs Last 24 Hrs  T(C): 35.9 (23 Oct 2023 21:47), Max: 37.1 (23 Oct 2023 14:45)  T(F): 96.7 (23 Oct 2023 21:47), Max: 98.7 (23 Oct 2023 14:45)  HR: 80 (23 Oct 2023 21:47) (80 - 87)  BP: 129/92 (23 Oct 2023 21:47) (129/92 - 171/91)  BP(mean): --  RR: 18 (23 Oct 2023 21:47) (18 - 18)  SpO2: --     I&O's Summary    23 Oct 2023 07:01  -  24 Oct 2023 07:00  --------------------------------------------------------  IN: 1050 mL / OUT: 450 mL / NET: 600 mL        GENERAL:  [X ] No acute distress [ ]Lethargic  [ ]Unarousable  [ ]Verbal  [ ]Non-Verbal [ ]Cachexia    BEHAVIORAL/PSYCH:  [ ]Alert and Oriented x  [ ] Anxiety [ ] Delirium [ ] Agitation [X ] Calm   EYES: [ ] No scleral icterus [ ] Scleral icterus [ ] Closed  ENMT:  [ ]Dry mouth  [ ]No external oral lesions [ ] No external ear or nose lesions  CARDIOVASCULAR:  [ ]Regular [ ]Irregular [ ]Tachy [ ]Not Tachy  [ ]Ata [ ] Edema [ ] No edema  PULMONARY:  [ ]Tachypnea  [ ]Audible excessive secretions [X ] No labored breathing [ ] labored breathing  GASTROINTESTINAL: [ ]Soft  [ ]Distended  [ X]Not distended [ ]Non tender [ ]Tender  MUSCULOSKELETAL: [ ]No clubbing [ ] clubbing  [ ] No cyanosis [ ] cyanosis  NEUROLOGIC: [ ]No focal deficits  [ ]Follows commands  [ ]Does not follow commands  [ ]Cognitive impairment  [ ]Dysphagia  [ ]Dysarthria  [ ]Paresis   SKIN: [ ] Jaundiced [X ] Non-jaundiced [ ]Rash [ ]No Rash [ ] Warm [ ] Dry  MISC/LINES: [ ] ET tube [ ] Trach [ ]NGT/OGT [ ]PEG [ ]Han    CRITICAL CARE:  [ ] Shock Present  [ ]Septic [ ]Cardiogenic [ ]Neurologic [ ]Hypovolemic  [ ]  Vasopressors [ ]  Inotropes   [ ]Respiratory failure present [ ]Mechanical ventilation [ ]Non-invasive ventilatory support [ ]High flow  [ ]Acute  [ ]Chronic [ ]Hypoxic  [ ]Hypercarbic [ ]Other  [ ]Other organ failure     LABS: reviewed by me                        11.4   7.63  )-----------( 170      ( 24 Oct 2023 07:55 )             35.7   10-24    139  |  101  |  15  ----------------------------<  68<L>  3.8   |  23  |  0.7    Ca    8.8      24 Oct 2023 07:55    TPro  5.9<L>  /  Alb  3.6  /  TBili  0.7  /  DBili  x   /  AST  21  /  ALT  9   /  AlkPhos  85  10-24      Urinalysis Basic - ( 24 Oct 2023 07:55 )    Color: x / Appearance: x / SG: x / pH: x  Gluc: 68 mg/dL / Ketone: x  / Bili: x / Urobili: x   Blood: x / Protein: x / Nitrite: x   Leuk Esterase: x / RBC: x / WBC x   Sq Epi: x / Non Sq Epi: x / Bacteria: x      RADIOLOGY & ADDITIONAL STUDIES: reviewed by me    PROTEIN CALORIE MALNUTRITION PRESENT: [ ]mild [ ]moderate [ ]severe [ ]underweight [ ]morbid obesity  https://www.andeal.org/vault/2440/web/files/ONC/Table_Clinical%20Characteristics%20to%20Document%20Malnutrition-White%20JV%20et%20al%202012.pdf    Height (cm): 177.8 (10-20-23 @ 12:18)  Weight (kg): 59 (10-20-23 @ 12:18)  BMI (kg/m2): 18.7 (10-20-23 @ 12:18)    [ ]PPSV2 < or = to 30% [ ]significant weight loss  [ ]poor nutritional intake  [ ]anasarca      [ ]Artificial Nutrition          Palliative Care Spiritual/Emotional Screening Tool Question  Severity (0-4):                    OR                    [X ] Unable to determine/NA  Score of 2 or greater indicates recommendation of Chaplaincy referral  Chaplaincy Referral: [ ] Yes [ ] Refused [ ] Following     Caregiver [place]:  [ ] Yes [ ] No    OR    [x ] Unable to determine. Will assess at later time if appropriate.  Social Work Referral [ ]  Patient and Family Centered Care Referral [ ]    Anticipatory Grief Present: [ ] Yes [ ] No    OR     [ x] Unable to determine. Will assess at later time if appropriate.  Social Work Referral [ ]  Patient and Family Centered Care Referral [ ]    REFERRALS:   [ ]Chaplaincy  [ ]Hospice  [ ]Child Life  [ ]Social Work  [ ]Case management [ ]Holistic Therapy     Palliative care education provided to patient and/or family    Goals of Care Document:     ______________  Anatoliy Contreras MD  Palliative Medicine  [facility]   of Geriatric and Palliative Medicine  [phone]   HPI: 73M with no know prior PMH here with increasing weakness, agitation and inability to take care of himself. Found to have suspected dementia with behavioral disturbances, and possible UTI, started on zyprexa and UTI. Found to have acute CVA on MRI, on neurochecks, failed SLP eval as well, now NPO. Also with ARNULFO, likely pre-renal, improved with hydration. Palliative care consulted for GOC.    PERTINENT PM/SXH:   Hypertension, unspecified type    No significant past surgical history      FAMILY HISTORY: difficult to obtain from patient    ITEMS NOT CHECKED ARE NOT PRESENT    SOCIAL HISTORY:   Significant other/partner[ ]  Children[ ]  Jew/Spirituality:  Substance hx:  [ ]   Tobacco hx:  [ ]   Alcohol hx: [ ]   Living Situation: [X ]Home  [ ]Long term care  [ ]Rehab [ ]Other  Home Services: [ ] HHA [ ] Visting RN [ ] Hospice  Occupation:  Home Opioid hx:  [ ] Y [ ] N [ ] I-Stop Reference No:    ADVANCE DIRECTIVES:    [ ] Full Code [ ] DNR  MOLST  [ ]  Living Will  [ ]   DECISION MAKER(s):  [ ] Health Care Proxy(s)  [ ] Surrogate(s)  [ ] Guardian           Name(s): Phone Number(s):   brother Shaggy [phone]  niece Dr. Corado [phone]    BASELINE (I)ADL(s) (prior to admission):  [place]: [ ]Total  [ ] Moderate [ ]Dependent  Palliative Performance Status Version 2:         %    http://npcrc.org/files/news/palliative_performance_scale_ppsv2.pdf    Allergies    No Known Allergies    Intolerances    MEDICATIONS  (STANDING):  ampicillin  IVPB 500 milliGRAM(s) IV Intermittent every 6 hours  aspirin Suppository 300 milliGRAM(s) Rectal daily  atorvastatin 40 milliGRAM(s) Oral at bedtime  enoxaparin Injectable 40 milliGRAM(s) SubCutaneous every 24 hours  lactated ringers. 500 milliLiter(s) (100 mL/Hr) IV Continuous <Continuous>  OLANZapine 2.5 milliGRAM(s) Oral at bedtime  tamsulosin 0.4 milliGRAM(s) Oral at bedtime    MEDICATIONS  (PRN):  OLANZapine 2.5 milliGRAM(s) Oral every 8 hours PRN agitation      PRESENT SYMPTOMS: [ ] Unable to obtain due to poor mentation   Source if other than patient:  [ ]Family   [ ]Team     Pain: [ ]yes [ X]no  QOL impact -   Location -                    Aggravating factors -  Quality -  Radiation -  Timing-  Severity (0-10 scale):  Minimal acceptable level (0-10 scale):     CPOT:    https://www.[facility].org/getattachment/[email or web address]/Critical-Care-Pain-Observation-Tool-(CPOT)    PAIN AD Score:   http://geriatrictoolkit.[facility]/cog/painad.pdf (press ctrl +  left click to view)    Dyspnea:                           [ ]None[ ]Mild [ ]Moderate [ ]Severe     Respiratory Distress Observation Scale (RDOS):   A score of 0 to 2 signifies little or no respiratory distress, 3 signifies mild distress, scores 4 to 6 indicate moderate distress, and scores greater than 7 signify severe distress  https://www.[facility].ca/sites/default/files/PDFS/326942-ugqszwnpqhk-xcoskqwv-gyomjhuqenm-pllfg.pdf    Anxiety:                             [ ]None[ ]Mild [ ]Moderate [ ]Severe   Fatigue:                             [ ]None[ ]Mild [ ]Moderate [ ]Severe   Nausea:                             [ ]None[ ]Mild [ ]Moderate [ ]Severe   Loss of appetite:              [ ]None[ ]Mild [ ]Moderate [ ]Severe   Constipation:                    [ ]None[ ]Mild [ ]Moderate [ ]Severe    Other Symptoms:  [X ]All other review of systems negative     Palliative Performance Status Version 2:         %    http://[facility].org/files/news/palliative_performance_scale_ppsv2.pdf  PHYSICAL EXAM:  Vital Signs Last 24 Hrs  T(C): 35.9 (23 Oct 2023 21:47), Max: 37.1 (23 Oct 2023 14:45)  T(F): 96.7 (23 Oct 2023 21:47), Max: 98.7 (23 Oct 2023 14:45)  HR: 80 (23 Oct 2023 21:47) (80 - 87)  BP: 129/92 (23 Oct 2023 21:47) (129/92 - 171/91)  BP(mean): --  RR: 18 (23 Oct 2023 21:47) (18 - 18)  SpO2: --     I&O's Summary    23 Oct 2023 07:01  -  24 Oct 2023 07:00  --------------------------------------------------------  IN: 1050 mL / OUT: 450 mL / NET: 600 mL        GENERAL:  [X ] No acute distress [ ]Lethargic  [ ]Unarousable  [ ]Verbal  [ ]Non-Verbal [ ]Cachexia    BEHAVIORAL/PSYCH:  [ ]Alert and Oriented x  [ ] Anxiety [ ] Delirium [ ] Agitation [X ] Calm   EYES: [X ] No scleral icterus [ ] Scleral icterus [ ] Closed  ENMT:  [ ]Dry mouth  [ ]No external oral lesions [X ] No external ear or nose lesions  CARDIOVASCULAR:  [ ]Regular [ ]Irregular [ ]Tachy [ ]Not Tachy  [ ]Ata [ ] Edema [ ] No edema  PULMONARY:  [ ]Tachypnea  [ ]Audible excessive secretions [X ] No labored breathing [ ] labored breathing  GASTROINTESTINAL: [ ]Soft  [ ]Distended  [ X]Not distended [ ]Non tender [ ]Tender  MUSCULOSKELETAL: [ ]No clubbing [ ] clubbing  [ X] No cyanosis [ ] cyanosis  NEUROLOGIC: [ ]No focal deficits  [ X]Follows commands  [ ]Does not follow commands  [ ]Cognitive impairment  [ ]Dysphagia  [ ]Dysarthria  [ ]Paresis   SKIN: [ ] Jaundiced [X ] Non-jaundiced [ ]Rash [ ]No Rash [ ] Warm [ ] Dry  MISC/LINES: [ ] ET tube [ ] Trach [ ]NGT/OGT [ ]PEG [ ]Han    CRITICAL CARE:  [ ] Shock Present  [ ]Septic [ ]Cardiogenic [ ]Neurologic [ ]Hypovolemic  [ ]  Vasopressors [ ]  Inotropes   [ ]Respiratory failure present [ ]Mechanical ventilation [ ]Non-invasive ventilatory support [ ]High flow  [ ]Acute  [ ]Chronic [ ]Hypoxic  [ ]Hypercarbic [ ]Other  [ ]Other organ failure     LABS: reviewed by me                        11.4   7.63  )-----------( 170      ( 24 Oct 2023 07:55 )             35.7   10-24    139  |  101  |  15  ----------------------------<  68<L>  3.8   |  23  |  0.7    Ca    8.8      24 Oct 2023 07:55    TPro  5.9<L>  /  Alb  3.6  /  TBili  0.7  /  DBili  x   /  AST  21  /  ALT  9   /  AlkPhos  85  10-24      Urinalysis Basic - ( 24 Oct 2023 07:55 )    Color: x / Appearance: x / SG: x / pH: x  Gluc: 68 mg/dL / Ketone: x  / Bili: x / Urobili: x   Blood: x / Protein: x / Nitrite: x   Leuk Esterase: x / RBC: x / WBC x   Sq Epi: x / Non Sq Epi: x / Bacteria: x      RADIOLOGY & ADDITIONAL STUDIES: reviewed by me  CXR 10/23/23    No radiographic evidence of focal consolidation, pleural effusion, or   pneumothorax.    PROTEIN CALORIE MALNUTRITION PRESENT: [ ]mild [ ]moderate [ ]severe [ ]underweight [ ]morbid obesity  https://www.andeal.org/vault/2440/web/files/ONC/Table_Clinical%20Characteristics%20to%20Document%20Malnutrition-White%20JV%20et%20al%202012.pdf    Height (cm): 177.8 (10-20-23 @ 12:18)  Weight (kg): 59 (10-20-23 @ 12:18)  BMI (kg/m2): 18.7 (10-20-23 @ 12:18)    [ ]PPSV2 < or = to 30% [ ]significant weight loss  [ ]poor nutritional intake  [ ]anasarca      [ ]Artificial Nutrition          Palliative Care Spiritual/Emotional Screening Tool Question  Severity (0-4):                    OR                    [X ] Unable to determine/NA  Score of 2 or greater indicates recommendation of Chaplaincy referral  Chaplaincy Referral: [ ] Yes [ ] Refused [ ] Following     Caregiver [place]:  [ ] Yes [ ] No    OR    [x ] Unable to determine. Will assess at later time if appropriate.  Social Work Referral [ ]  Patient and Family Centered Care Referral [ ]    Anticipatory Grief Present: [ ] Yes [ ] No    OR     [ x] Unable to determine. Will assess at later time if appropriate.  Social Work Referral [ ]  Patient and Family Centered Care Referral [ ]    REFERRALS:   [ ]Chaplaincy  [ ]Hospice  [ ]Child Life  [ ]Social Work  [ ]Case management [ ]Holistic Therapy     Palliative care education provided to patient and/or family    Goals of Care Document:     ______________  Anatoliy Contreras MD  Palliative Medicine  [facility]   of Geriatric and Palliative Medicine  [phone]

## 2023-10-24 NOTE — CONSULT NOTE ADULT - PROBLEM SELECTOR RECOMMENDATION 5
- d/w sister at bedside, introduced role of palliative care  - patient is a [place] [employer] vet, has 11 siblings and many nieces, nephews  - family thus far has deferred PEG/NGT placement, ongoing GOC  - placed called to niece Dr. Corado, who acts as main family contact for medical matters; voicemail left with palliative callback number

## 2023-10-24 NOTE — SWALLOW BEDSIDE ASSESSMENT ADULT - SWALLOW EVAL: CURRENT DIET
NPO pending speech & swallow eval
Easy to chew/mildly thick liquids
Regular/Thin
Easy to chew/mildly thick liquids
Soft & bite sized/ mildly thick liquids

## 2023-10-24 NOTE — PROGRESS NOTE ADULT - ASSESSMENT
74y/o male with no PMH brought in by niece for concerns of increasing weakness, agitation and inability to take care of himself.       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   #Rule out metabolic encephalopathy   #Possible UTI   - Reversible causes of dementia ruled out - CT Head negative, b12, folate, TSH, VDRL   - MRI c spine negative   - MRI brain showed acute CVA   - c/w Zyprexa PRN for agitation   - s/p course of Ampicillin for UTI    - Psych appreciated   - neuro consult noted      #Acute CVA, embolic per neuro  - Brain MRI: small scattered acute infarcts in the left frontal and parietal subcortical white matter.  - CTA neck: Moderate stenosis (50%) in the proximal left cervical ICA resulting in moderate. Mild focal stenosis at the origin of the right vertebral artery.   - c/w remote telemetry - no events thus far   - TTE done   - c/w neuro checks   - neuro f/u - will need DAPT x 21 days  - statin via NGT if family agreeable   - SLP eval - failed, NPO for now, spoke to niece Dr. Corado [phone], she will speak to aunt regarding PEG and NGT   - Palliative consult pending     #Urinary retention   #Gross Hematuria    #BPH  - passed TOV 10/19, now retaining again, mccormick placed again 10/23  - c/w Flomax (once has NGT)   - PSA level wnl      #Leukocytosis - resolved   - afebrile   - CXR negative, repeat urine culture negative   - s/p ampicillin for enterococcal UTI     #ARNULFO, suspect pre-renal   - resolved after hydration     #Isolated elevated ALP: follow up outpatient     DVT ppx     Pending: possible transfer to [facility] , NPO for now, palliative, possible NGT/PEG   [facility] [place] over the phone   Dispo: STR.

## 2023-10-24 NOTE — SWALLOW BEDSIDE ASSESSMENT ADULT - SWALLOW EVAL: ORAL MUSCULATURE
Generalized weakness
unable to assess due to poor participation/comprehension
Generalized weakness
unable to assess due to poor participation/comprehension
Generalized weakness
Generalized weakness

## 2023-10-24 NOTE — SWALLOW BEDSIDE ASSESSMENT ADULT - SLP GENERAL OBSERVATIONS
Pt found laying in bed w/ significant lethargy. Pt unarousable to verbal and tactile stimuli.
Pt found laying in bed alert and awake. Pt w/ severe dysarthria. RN reports pt was choking on meal a couple of days ago.
Pt found laying in bed alert and awake. Pt w/ severe dysarthria.
Pt found laying in bed asleep with wrist retrains on. Per RN pulling out Han.
Pt found laying in bed alert and awake. Pt w/ dysarthria.
Pt found laying in bed alert and awake. As per niece, pt stopped talking clearly and walking about 2 years ago. Pt w/ significant weight loss. Pt never went to doctor after change because they didn't believe in doctors. Pt oriented to name and date.
Pt found laying in bed alert and awake w/ breakfast tray at bedside. Pt w/ dysarthria.

## 2023-10-24 NOTE — SWALLOW BEDSIDE ASSESSMENT ADULT - SWALLOW EVAL: DIAGNOSIS
Not a candidate for PO trials at this time. Pt w/ significantly delayed and effortful swallow trigger.

## 2023-10-24 NOTE — PROGRESS NOTE ADULT - SUBJECTIVE AND OBJECTIVE BOX
Neurology Stroke Progress Note    INTERVAL HPI/OVERNIGHT EVENTS:  Patient seen and examined this morning by neurovascular team due to MRI findings showing Small scattered acute infarcts in the left frontal and parietal subcortical white matter.    MEDICATIONS  (STANDING):  aspirin enteric coated 81 milliGRAM(s) Oral daily  atorvastatin 40 milliGRAM(s) Oral at bedtime  cloNIDine Patch 0.1 mG/24Hr(s) 1 patch Transdermal every 7 days  dextrose 5% + sodium chloride 0.45% with potassium chloride 20 mEq/L 1000 milliLiter(s) (80 mL/Hr) IV Continuous <Continuous>  tamsulosin 0.4 milliGRAM(s) Oral at bedtime    MEDICATIONS  (PRN):  OLANZapine 2.5 milliGRAM(s) Oral every 8 hours PRN agitation      Allergies    No Known Allergies    Intolerances        ROS: As per HPI, otherwise negative    Vital Signs Last 24 Hrs  T(C): 36.9 (24 Oct 2023 12:34), Max: 36.9 (24 Oct 2023 12:34)  T(F): 98.5 (24 Oct 2023 12:34), Max: 98.5 (24 Oct 2023 12:34)  HR: 85 (24 Oct 2023 13:40) (80 - 94)  BP: 184/88 (24 Oct 2023 13:40) (129/92 - 202/77)  BP(mean): --  RR: 18 (24 Oct 2023 12:34) (18 - 18)  SpO2: --        Physical exam:  General: awake , no acute distress    Neurologic:  Mental status: awake, orientated to self and hospital. hypophonic/severely dysarthric. Follows comands  Cranial nerves:   II: visual fields are full to confrontation.  III, IV, VI: able to track examiner  V:  V1-V3 sensation intact,   VII: no facial droop  Motor: Normal bulk and tone, strength 5/5 in b/l UE and LE,  strength 5/5. No pronator drift  Sensation: intact to light touch. No neglect.  Coordination: No dysmetria on finger-to-nose and heel-to-shin    NIHSS 4 for level of alertness, questions and following commands    LABS:                        11.4   7.63  )-----------( 170      ( 24 Oct 2023 07:55 )             35.7     10-24    139  |  101  |  15  ----------------------------<  68<L>  3.8   |  23  |  0.7    Ca    8.8      24 Oct 2023 07:55    TPro  5.9<L>  /  Alb  3.6  /  TBili  0.7  /  DBili  x   /  AST  21  /  ALT  9   /  AlkPhos  85  10-24      Urinalysis Basic - ( 24 Oct 2023 07:55 )    Color: x / Appearance: x / SG: x / pH: x  Gluc: 68 mg/dL / Ketone: x  / Bili: x / Urobili: x   Blood: x / Protein: x / Nitrite: x   Leuk Esterase: x / RBC: x / WBC x   Sq Epi: x / Non Sq Epi: x / Bacteria: x        RADIOLOGY & ADDITIONAL TESTS:  < from: MR Head No Cont (10.20.23 @ 20:02) >    IMPRESSION:    Small scattered acute infarcts in the left frontal and parietal   subcortical white matter.    Moderate chronic microvascular ischemic changes and multiple scattered   chronic lacunar infarcts.    Multiple scattered foci of susceptibility signals which may represent   sequela of hypertensive microhemorrhage, amyloid angiopathy, or prior   inflammation or infection.      CT Head No Cont (10.16.23 @ 12:30) >  IMPRESSION:    1. Mild atrophic changes.    2. Foci of diminished attenuation in the periventricular white matter,   subcortical white matter, and basal ganglia likely representing ischemic   change, age indeterminant.    CT Angio Neck w/ IV Cont (10.21.23 @ 21:40) >  CTA brain:  The distal internal carotid arteries are patent.  Thecircle of [place] is normal in appearance.  The visualized cerebral arteries are unremarkable.  The vertebrobasilar junction and basilar artery are normal.      IMPRESSION:  CTA neck: Moderate stenosis (50%) in the proximal left cervical ICA   resulting in moderate. Mild focal stenosis at the origin of the right   vertebral artery.    CTA brain: No stenosis or aneurysm.

## 2023-10-24 NOTE — PROGRESS NOTE ADULT - SUBJECTIVE AND OBJECTIVE BOX
24H events:    Patient is a 73y old Male who presents with a chief complaint of Weakness, confusion, inability to ambulate (24 Oct 2023 14:44)    Primary diagnosis of Weakness    Today is hospital day 8d. This morning patient was seen and examined at bedside, resting comfortably in bed. Patient was more vocal and alert than yesterday. No acute or major events overnight. Hemodynamically stable, tolerating oral diet,  with appropriate bowel movements.     PAST MEDICAL & SURGICAL HISTORY  Hypertension, unspecified type    No significant past surgical history      SOCIAL HISTORY:  Social History:      ALLERGIES:  No Known Allergies    MEDICATIONS:  STANDING MEDICATIONS  aspirin enteric coated 81 milliGRAM(s) Oral daily  atorvastatin 40 milliGRAM(s) Oral at bedtime  cloNIDine Patch 0.1 mG/24Hr(s) 1 patch Transdermal every 7 days  dextrose 5% + sodium chloride 0.45% with potassium chloride 20 mEq/L 1000 milliLiter(s) IV Continuous <Continuous>  tamsulosin 0.4 milliGRAM(s) Oral at bedtime    PRN MEDICATIONS  OLANZapine 2.5 milliGRAM(s) Oral every 8 hours PRN    VITALS:   T(F): 98.5  HR: 85  BP: 184/88  RR: 18  SpO2: --    PHYSICAL EXAM:  GENERAL: NAD, lying in bed comfortably, lethargic, elderly. Has indwelling catheter  HEAD: NCAD,    NECK: Supple, no neck stiffness/nuchal rigidity  HEART: Regular rate and rhythm, normal S1/S2,  LUNGS: No acute respiratory distress, clear b/l breath sounds,  ABDOMEN:  soft, non-tender, non-distended,   EXTREMITIES: no rashes, extremities warm/dry,  NERVOUS SYSTEM:  Responds to questions, improved vocalization compared to yesterday. A+Ox0.   SKIN: No rashes or lesions      LABS:                        11.4   7.63  )-----------( 170      ( 24 Oct 2023 07:55 )             35.7     10-24    139  |  101  |  15  ----------------------------<  68<L>  3.8   |  23  |  0.7    Ca    8.8      24 Oct 2023 07:55    TPro  5.9<L>  /  Alb  3.6  /  TBili  0.7  /  DBili  x   /  AST  21  /  ALT  9   /  AlkPhos  85  10-24      Urinalysis Basic - ( 24 Oct 2023 07:55 )    Color: x / Appearance: x / SG: x / pH: x  Gluc: 68 mg/dL / Ketone: x  / Bili: x / Urobili: x   Blood: x / Protein: x / Nitrite: x   Leuk Esterase: x / RBC: x / WBC x   Sq Epi: x / Non Sq Epi: x / Bacteria: x            Culture - Blood (collected 23 Oct 2023 07:34)  Source: .Blood None  Preliminary Report (24 Oct 2023 14:02):    No growth at 24 hours    Culture - Blood (collected 22 Oct 2023 17:18)  Source: .Blood None  Preliminary Report (24 Oct 2023 01:01):    No growth at 24 hours    Culture - Urine (collected 22 Oct 2023 11:20)  Source: Catheterized Catheterized  Final Report (23 Oct 2023 10:17):    No growth          RADIOLOGY:    RADIOLOGY

## 2023-10-24 NOTE — CONSULT NOTE ADULT - ASSESSMENT
73M with no know prior PMH here with increasing weakness, agitation and inability to take care of himself. Found to have suspected dementia with behavioral disturbances, and possible UTI, started on zyprexa and UTI. Found to have acute CVA on MRI, on neurochecks, failed SLP eval as well, now NPO. Also with ARNULFO, likely pre-renal, improved with hydration. Palliative care consulted for [facility].

## 2023-10-24 NOTE — PROGRESS NOTE ADULT - ASSESSMENT
Patient is a 73 yoM [place] [employer] with PMH of HTN not compliant with medications brought in by niece for agitation, generalized weakness/malnutrition, and confusion. Patient's unknown PMH of possible stroke 2 years ago and non controlled HTN along with patient's exam revealing UMN signs as well as history of step wise gait and balance/coordination issues, urinary retention, and rigidity. General neurology was consulted for dysphagia but patient's speech evaluation revealed tolerating easy to chew diet. Neurovascular was consulted due to incidental acute infarcts in the left frontal and parietal subcortical white matter on MRI which may be cardioembolic in etiology.      Recommendations:  - continue ASA GA as patient currently NPO  - when patient able to tolerate PO/NGT place or PEG in place - start ASA 81mg and plavix 75 mg for 21 days, after 21 days continue aspirin 81 mg only and discontinue plavix 75mg  - continue atorvastatin 40mg   - consider ILR  - neurochecks q 8hour  - lovenox for subq DVT prophlyaxis unless contraindiciation from primary team & SCDs  - PT/OT  - stroke clinic follow up    Discussed with attending, Dr. Davey Guerra

## 2023-10-24 NOTE — SWALLOW BEDSIDE ASSESSMENT ADULT - ADDITIONAL RECOMMENDATIONS
MD requested f/u in afternoon. SLP will f/u
Will f/u for candidacy for instrumental after neuro testing completed

## 2023-10-24 NOTE — PROGRESS NOTE ADULT - ASSESSMENT
Patient is a 72y/o male with no PMH brought in by niece for concerns of increasing weakness, agitation and inability to take care of himself.     # Confusion with weakness and inability to ambulate:   # Acute CVA  # underlying dementia?  #dysphagia  - Brought in to ER by his niece who says his living environment at home is unsafe and mental status has acutely deteriorated to the point of patient being unable to care for himself.   - Given that patient is  of the [place], niece would like SW to be involved and [facility] [place]  - Will check TSH, B12, RPR in the AM f/u levels  - F/u SW and PT recs  - Check basic labs  - Niece wonder's if patient has underlying PTSD (she also works in healthcare), although he denies insomnia, anxiety, or PTSD symptoms. May consider psych consult if he develops the above symptoms  10/17  - check lipid profile and A1C  - Psychiatry consulted  - Haldol 1mg Q8 prn  10/18  - on seroquel 25mg QHS and haldol 1mg Q8H PRN  - will switch seroquel to zyprexa.  - Psychiatry evaluation today.  - PT unable to assess  10/19  - Psychiatry recs appreciated. recommend Zydis 2.5mg qHS.  - continue zyprexa at 2.5mg q8  - daily ECG  - EEG: diffuse or multifocal cerebral dysfunction.  - Physiatry: Short term rehab in skilled nursing facility / SNF  - off restraints, DC'd IV fluids.  10/20  - Neuro recs appreciated: MR brain, cervical/thoracic spine w/o contrast. myasthenia gravis panel and myositis labs ordered.  10/23  - Pending Neuro follow-up on MRI results.   - Speech and swallow to reevaluate.  - MRI:   10/24  - failed speech and swallow x 2  - Family refused NG tube  - Neurology recs appreciated: start ASA 81mg and plavix 75mg for 21 days. then discontinue plavix and continue aspirin. Continue atorvastatin. Neurochecks Q8H, lovenox for subq dvt prophylaxis. PT/OT, Stroke clinic follow-up  - Started ASA 81mg, held plavix for potential PEG placement    #Hypertension  #Acute stroke  - start linionpril 5  - hypertensive episode today likely 2/2 urinary retention s/p 10mg labetalol  - clonidine patch if patient continues to be hypertensive  10/18  - nurses unable to assess blood pressure due to patient agitated.  - Last BP 190s/90s s/p labetalol push and clonidine patch.  - continue clonidine patch, reassess BP when patient is stable.  10/19  - Continue lisinopril 5mg  - s/p x2 Clonidine patch 0.1mg/24hr  10/22  - MR head: Small scattered acute infarcts in the left frontal and parietal subcortical white matter  - Permissive hypertension per Dr. Cleary. Labetalol 200mg if SBP above 200  10/24  -  Neurology recs appreciated: start ASA 81mg and plavix 75mg for 21 days. then discontinue plavix and continue aspirin. Continue atorvastatin. Neurochecks Q8H, lovenox for subq dvt prophylaxis. PT/OT, Stroke clinic follow-up  - Started ASA 81mg, held plavix for potential PEG placement    #Urinary retention  #BPH  - blood clots at penile head likely due to traumatic straight catheterization  - Urology consulted  - following BUN/Cr  - bladder scan  - Start flomax.  - PSA levels   10/18  - PSA normal  - s/p indwelling catheter and 400cc of blood tinged urinedrained.  - pending URO recommendations  10/19  - trial of void  10/20  - successful trial of void  - bladder scan showing 100mL  - on flomax  10/22  - s/p straight cath.  - UA, UCx, Bcx ordered.  10/23  - UA: negative for bacteria, RBC >1900  - CMP, CBC pending.  - Will start flomax, after speech & swallow clearance.  - If patient is still retaining with flomax, then we will start mccormick.  10/24   - Patient failed speech and swallow, family considering peg tube.   - start flomax after PEG tube        #Age-indeterminate ischemic changes on CT head  - May be contributing to change in mental status  - F/u BP control, outpatient f/u    # Hypokalemia: replete and repeat    # Isolated elevated ALP: follow up outpatient     #MISC  - Diet: dash tlc  - GI ppx: not needed  - DVT ppx: Lovenox SQ daily  - Activity: ambulate AT, pending PT  - Dispo: Transfer to [facility]

## 2023-10-24 NOTE — PROGRESS NOTE ADULT - SUBJECTIVE AND OBJECTIVE BOX
Pt seen and examined at bedside.  Awake, alert, confused, follows commands. Failed SLP. Han placed, having hematuria.         VITAL SIGNS (Last 24 hrs):  T(C): 36.9 (10-24-23 @ 12:34), Max: 37.1 (10-23-23 @ 14:45)  HR: 85 (10-24-23 @ 13:40) (80 - 94)  BP: 184/88 (10-24-23 @ 13:40) (129/92 - 202/77)  RR: 18 (10-24-23 @ 12:34) (18 - 18)        I&O's Summary    23 Oct 2023 07:01  -  24 Oct 2023 07:00  --------------------------------------------------------  IN: 1050 mL / OUT: 450 mL / NET: 600 mL    24 Oct 2023 07:01  -  24 Oct 2023 14:24  --------------------------------------------------------  IN: 0 mL / OUT: 600 mL / NET: -600 mL        PHYSICAL EXAM:  GENERAL: NAD   HEAD:  Atraumatic, Normocephalic  EYES:  conjunctiva and sclera clear  NECK: Supple, No JVD  CHEST/LUNG: Clear to auscultation bilaterally; No wheeze  HEART: Regular rate and rhythm; No murmurs, rubs, or gallops  ABDOMEN: Soft, Nontender, Nondistended; Bowel sounds present  EXTREMITIES:  2+ Peripheral Pulses, No clubbing, cyanosis, or edema  NEUROLOGY: awake, alert, moving all extremities, follows commands, speech slowed  SKIN: No rashes or lesions    Labs Reviewed          CBC Full  -  ( 24 Oct 2023 07:55 )  WBC Count : 7.63 K/uL  Hemoglobin : 11.4 g/dL  Hematocrit : 35.7 %  Platelet Count - Automated : 170 K/uL  Mean Cell Volume : 91.5 fL  Mean Cell Hemoglobin : 29.2 pg  Mean Cell Hemoglobin Concentration : 31.9 g/dL  Auto Neutrophil # : x  Auto Lymphocyte # : x  Auto Monocyte # : x  Auto Eosinophil # : x  Auto Basophil # : x  Auto Neutrophil % : x  Auto Lymphocyte % : x  Auto Monocyte % : x  Auto Eosinophil % : x  Auto Basophil % : x    BMP:    10-24 @ 07:55    Blood Urea Nitrogen - 15  Calcium - 8.8  Carbond Dioxide - 23  Chloride - 101  Creatinine - 0.7  Glucose - 68  Potassium - 3.8  Sodium - 139      Hemoglobin A1c -     Urine Culture:  10-23 @ 07:34 Urine culture: --    Culture Results:   No growth at 24 hours  Method Type: --  Organism: --  Organism Identification: --  Specimen Source: .Blood None  10-22 @ 17:18 Urine culture: --    Culture Results:   No growth at 24 hours  Method Type: --  Organism: --  Organism Identification: --  Specimen Source: .Blood None  10-22 @ 11:20 Urine culture: --    Culture Results:   No growth  Method Type: --  Organism: --  Organism Identification: --  Specimen Source: Catheterized Catheterized            MEDICATIONS  (STANDING):  atorvastatin 40 milliGRAM(s) Oral at bedtime  cloNIDine Patch 0.1 mG/24Hr(s) 1 patch Transdermal every 7 days  dextrose 5% + sodium chloride 0.45% with potassium chloride 20 mEq/L 1000 milliLiter(s) (80 mL/Hr) IV Continuous <Continuous>  tamsulosin 0.4 milliGRAM(s) Oral at bedtime    MEDICATIONS  (PRN):  OLANZapine 2.5 milliGRAM(s) Oral every 8 hours PRN agitation

## 2023-10-25 NOTE — PROGRESS NOTE ADULT - ASSESSMENT
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 Initially found to have some scattered acute infarcts on MRI. Today, pt with increasing lethargy, AMS and mute. LKW was 7 pm yesterday. CTH showed no acute pathology. CTA/ CTP pending read.     Plan:    INCOMPLETE   Assessment:  73y year-old male with no PMH not on any home meds and history of service in [place] during [place], lives at home with his sister and is brought in by niece for concerns of increasing weakness, agitation and inability to take care of himself. Initially found to have some scattered acute infarcts on MRI. Today, pt with increasing lethargy, AMS and mute. LKW was 7 pm yesterday. CTH showed no acute pathology. CTA/ CTP pending read.     Plan:  follow up CTA/ CTP official read   continue asa suppository   once ngt is placed, start DAPT x 21 days, then asa monotherapy   consider ILR    Assessment:  73y year-old male with no PMH not on any home meds and history of service in [place] during [place], lives at home with his sister and is brought in by niece for concerns of increasing weakness, agitation and inability to take care of himself. Initially found to have some scattered acute infarcts on MRI. Today, pt with increasing lethargy, AMS and mute. LKW was 7 pm yesterday. CTH showed no acute pathology. CTA/ CTP pending read.     Plan:  follow up CTA/ CTP official read   continue asa suppository   once ngt is placed, start DAPT x 21 days, then asa monotherapy   consider ILR   neurochect q8  monitor BP   medical management per primary team

## 2023-10-25 NOTE — PROGRESS NOTE ADULT - ASSESSMENT
72 y/o m in clot retention      A) Clot retention 2/2 trauma from pt pulling on Han.      P) Han repositioned.  Irrigated with 500 cc of sterile water.  Moderate clots removed.  Han now draining clear yellow urine.  Irrigate Han q shift and prn with 100 cc of sterile water.  Prevent pt from pulling on Han.  will d/w attending

## 2023-10-25 NOTE — PROGRESS NOTE ADULT - ASSESSMENT
Patient is a 74y/o male with no PMH brought in by niece for concerns of increasing weakness, agitation and inability to take care of himself.     # Confusion with weakness and inability to ambulate:   # Acute CVA  # underlying dementia?  #dysphagia  - Brought in to ER by his niece who says his living environment at home is unsafe and mental status has acutely deteriorated to the point of patient being unable to care for himself.   - Given that patient is  of [place], niece would like SW to be involved and consider [facility] [place]  - Will check TSH, B12, RPR in the AM f/u levels  - F/u SW and PT recs  - Check basic labs  - Niece wonder's if patient has underlying PTSD (she also works in healthcare), although he denies insomnia, anxiety, or PTSD symptoms. May consider psych consult if he develops the above symptoms  10/17  - check lipid profile and A1C  - Psychiatry consulted  - Haldol 1mg Q8 prn  10/18  - on seroquel 25mg QHS and haldol 1mg Q8H PRN  - will switch seroquel to zyprexa.  - Psychiatry evaluation today.  - PT unable to assess  10/19  - Psychiatry recs appreciated. recommend Zydis 2.5mg qHS.  - continue zyprexa at 2.5mg q8  - daily ECG  - EEG: diffuse or multifocal cerebral dysfunction.  - Physiatry: Short term rehab in skilled nursing facility / SNF  - off restraints, DC'd IV fluids.  10/20  - Neuro recs appreciated: MR brain, cervical/thoracic spine w/o contrast. myasthenia gravis panel and myositis labs ordered.  10/23  - Pending Neuro follow-up on MRI results.   - Speech and swallow to reevaluate.  - MRI:   10/24  - failed speech and swallow x 2  - Family refused NG tube  - Neurology recs appreciated: start ASA 81mg and plavix 75mg for 21 days. then discontinue plavix and continue aspirin. Continue atorvastatin. Neurochecks Q8H, lovenox for subq dvt prophylaxis. PT/OT, Stroke clinic follow-up  - Started ASA 81mg, held plavix for potential PEG placement  10/25  - Stroke code called this morning  - CT prefusion: No perfusion evidence of   core infarct.  - CTA head/neck: stable stenosis of MCA, vertebral arteries and PCA, carotid arteries  - Pending neuro f/u  - Will not be transferred to stroke unit.  - Continue rectal Aspirin for now  - Family agreeable to PEG tube placement  - Peg tube placement held due to stroke code this morning.      #pneumonia likely 2/2 aspiration  - fever of 101.3 over night  - CXR showing left low lung opacity  - Started unasyn  - Chest PT ordered  - scopolamine patch for secretions  - on 2L saturating 95%      #Hypertension  #Acute stroke  - start linionpril 5  - hypertensive episode today likely 2/2 urinary retention s/p 10mg labetalol  - clonidine patch if patient continues to be hypertensive  10/18  - nurses unable to assess blood pressure due to patient agitated.  - Last BP 190s/90s s/p labetalol push and clonidine patch.  - continue clonidine patch, reassess BP when patient is stable.  10/19  - Continue lisinopril 5mg  - s/p x2 Clonidine patch 0.1mg/24hr  10/22  - MR head: Small scattered acute infarcts in the left frontal and parietal subcortical white matter  - Permissive hypertension per Dr. Cleary. Labetalol 200mg if SBP above 200  10/24  -  Neurology recs appreciated: start ASA 81mg and plavix 75mg for 21 days. then discontinue plavix and continue aspirin. Continue atorvastatin. Neurochecks Q8H, lovenox for subq dvt prophylaxis. PT/OT, Stroke clinic follow-up  - Started ASA 81mg, held plavix for potential PEG placement    #Urinary retention  #BPH  - blood clots at penile head likely due to traumatic straight catheterization  - Urology consulted  - following BUN/Cr  - bladder scan  - Start flomax.  - PSA levels   10/18  - PSA normal  - s/p indwelling catheter and 400cc of blood tinged urinedrained.  - pending URO recommendations  10/19  - trial of void  10/20  - successful trial of void  - bladder scan showing 100mL  - on flomax  10/22  - s/p straight cath.  - UA, UCx, Bcx ordered.  10/23  - UA: negative for bacteria, RBC >1900  - CMP, CBC pending.  - Will start flomax, after speech & swallow clearance.  - If patient is still retaining with flomax, then we will start mccormick.  10/24   - Patient failed speech and swallow, family considering peg tube.   - start flomax after PEG tube  10/25  - Continue fluids        #Age-indeterminate ischemic changes on CT head  - May be contributing to change in mental status  - F/u BP control, outpatient f/u    # Hypokalemia: replete and repeat    # Isolated elevated ALP: follow up outpatient     #MISC  - Diet: dash tlc  - GI ppx: not needed  - DVT ppx: Held due to hematuria  - Activity: ambulate AT, pending PT  - Dispo: Transfer to [facility]    Pending palliative and neuro f/u.

## 2023-10-25 NOTE — PROGRESS NOTE ADULT - SUBJECTIVE AND OBJECTIVE BOX
HPI: 73M with no know prior PMH here with increasing weakness, agitation and inability to take care of himself. Found to have suspected dementia with behavioral disturbances, and possible UTI, started on zyprexa and UTI. Found to have acute CVA on MRI, on neurochecks, failed SLP eval as well, now NPO. Also with ARNULFO, likely pre-renal, improved with hydration. Palliative care consulted for [facility].      ADVANCE DIRECTIVES:    [x ] Full Code [ ] DNR  MOLST  [ ]  Living Will  [ ]   DECISION MAKER(s):  [ ] Health Care Proxy(s)  [ ] Surrogate(s)  [ ] Guardian           Name(s): Phone Number(s):   brother Shaggy [phone]  niece Dr. Corado [phone]    BASELINE (I)ADL(s) (prior to admission):  [place]: [ ]Total  [ ] Moderate [ ]Dependent  Palliative Performance Status Version 2:         %    [email or web address]    Allergies    No Known Allergies    Intolerances    MEDICATIONS  (STANDING):  ampicillin/sulbactam  IVPB      ampicillin/sulbactam  IVPB 3 Gram(s) IV Intermittent every 6 hours  aspirin enteric coated 81 milliGRAM(s) Oral daily  atorvastatin 40 milliGRAM(s) Oral at bedtime  chlorhexidine 2% Cloths 1 Application(s) Topical <User Schedule>  cloNIDine Patch 0.1 mG/24Hr(s) 1 patch Transdermal every 7 days  dextrose 5% + sodium chloride 0.45% with potassium chloride 20 mEq/L 1000 milliLiter(s) (80 mL/Hr) IV Continuous <Continuous>  scopolamine 1 mG/72 Hr(s) Patch 1 Patch Transdermal every 72 hours  tamsulosin 0.4 milliGRAM(s) Oral at bedtime    MEDICATIONS  (PRN):  hydrALAZINE Injectable 10 milliGRAM(s) IV Push every 8 hours PRN SBP > 160  OLANZapine 2.5 milliGRAM(s) Oral every 8 hours PRN agitation        PRESENT SYMPTOMS: [ ] Unable to obtain due to poor mentation   Source if other than patient:  [ ]Family   [ ]Team     Pain: [ ]yes [ X]no  QOL impact -   Location -                    Aggravating factors -  Quality -  Radiation -  Timing-  Severity (0-10 scale):  Minimal acceptable level (0-10 scale):     CPOT:    https://[email or web address]/[email or web address]/Critical-Care-Pain-Observation-Tool-(CPOT)    PAIN AD Score:   [email or web address]/painad.pdf (press ctrl +  left click to view)    Dyspnea:                           [ x]None[ ]Mild [ ]Moderate [ ]Severe     Respiratory Distress Observation Scale (RDOS):   A score of 0 to 2 signifies little or no respiratory distress, 3 signifies mild distress, scores 4 to 6 indicate moderate distress, and scores greater than 7 signify severe distress  [email or web address]-mkivojxcybz-ocfgxlmh-dgauykfhoyd-[email or web address]    Anxiety:                             [ ]None[ ]Mild [ ]Moderate [ ]Severe   Fatigue:                             [ ]None[ ]Mild [ ]Moderate [ ]Severe   Nausea:                             [ ]None[ ]Mild [ ]Moderate [ ]Severe   Loss of appetite:              [ ]None[ ]Mild [ ]Moderate [ ]Severe   Constipation:                    [ ]None[ ]Mild [ ]Moderate [ ]Severe    Other Symptoms:  [X ]All other review of systems negative     Palliative Performance Status Version 2:         %    [email or web address]  PHYSICAL EXAM:    Vital Signs Last 24 Hrs  T(C): 36 (25 Oct 2023 12:58), Max: 38.6 (25 Oct 2023 06:08)  T(F): 96.8 (25 Oct 2023 12:58), Max: 101.4 (25 Oct 2023 06:08)  HR: 95 (25 Oct 2023 12:58) (84 - 109)  BP: 132/67 (25 Oct 2023 12:58) (132/67 - 207/105)  BP(mean): --  RR: 18 (25 Oct 2023 12:58) (18 - 20)  SpO2: 95% (24 Oct 2023 21:25) (95% - 95%)    Parameters below as of 24 Oct 2023 21:25  Patient On (Oxygen Delivery Method): room air          GENERAL:  [X ] No acute distress [ ]Lethargic  [ ]Unarousable  [ ]Verbal  [ ]Non-Verbal [ ]Cachexia    BEHAVIORAL/PSYCH:  [ ]Alert and Oriented x  [ ] Anxiety [ ] Delirium [ ] Agitation [X ] Calm   EYES: [X ] No scleral icterus [ ] Scleral icterus [ ] Closed  ENMT:  [ ]Dry mouth  [ ]No external oral lesions [X ] No external ear or nose lesions  CARDIOVASCULAR:  [ ]Regular [ ]Irregular [ ]Tachy [ ]Not Tachy  [ ]Ata [ ] Edema [x ] No edema  PULMONARY:  [ ]Tachypnea  [ ]Audible excessive secretions [X ] No labored breathing [ ] labored breathing  GASTROINTESTINAL: [ ]Soft  [ ]Distended  [ X]Not distended [ ]Non tender [ ]Tender  MUSCULOSKELETAL: [ ]No clubbing [ ] clubbing  [ X] No cyanosis [ ] cyanosis  NEUROLOGIC: [ ]No focal deficits  [ X]Follows commands  [ ]Does not follow commands  [ ]Cognitive impairment  [ ]Dysphagia  [ ]Dysarthria  [ ]Paresis   SKIN: [ ] Jaundiced [X ] Non-jaundiced [ ]Rash [ ]No Rash [ ] Warm [ ] Dry  MISC/LINES: [ ] ET tube [ ] Trach [ ]NGT/OGT [ ]PEG [ ]Han    CRITICAL CARE:  [ ] Shock Present  [ ]Septic [ ]Cardiogenic [ ]Neurologic [ ]Hypovolemic  [ ]  Vasopressors [ ]  Inotropes   [ ]Respiratory failure present [ ]Mechanical ventilation [ ]Non-invasive ventilatory support [ ]High flow  [ ]Acute  [ ]Chronic [ ]Hypoxic  [ ]Hypercarbic [ ]Other  [ ]Other organ failure     LABS: reviewed by me                                     11.6   12.09 )-----------( 190      ( 25 Oct 2023 08:25 )             34.4     10-25    134<L>  |  98  |  14  ----------------------------<  129<H>  3.8   |  21  |  0.9    Ca    8.5      25 Oct 2023 08:25    TPro  5.6<L>  /  Alb  3.2<L>  /  TBili  1.2  /  DBili  x   /  AST  16  /  ALT  9   /  AlkPhos  87  10-25    PT/INR - ( 25 Oct 2023 08:25 )   PT: 15.20 sec;   INR: 1.32 ratio         PTT - ( 25 Oct 2023 08:25 )  PTT:36.7 sec  Urinalysis Basic - ( 25 Oct 2023 08:25 )    Color: x / Appearance: x / SG: x / pH: x  Gluc: 129 mg/dL / Ketone: x  / Bili: x / Urobili: x   Blood: x / Protein: x / Nitrite: x   Leuk Esterase: x / RBC: x / WBC x   Sq Epi: x / Non Sq Epi: x / Bacteria: x        Lactate, Blood: 1.1 mmol/L (10-25 @ 08:25)        RADIOLOGY & ADDITIONAL STUDIES: reviewed by me  CXR 10/23/23    No radiographic evidence of focal consolidation, pleural effusion, or   pneumothorax.    PROTEIN CALORIE MALNUTRITION PRESENT: [ ]mild [ ]moderate [ ]severe [ ]underweight [ ]morbid obesity  https://www.[facility].org/vault/2440/web/files/ONC/Table_Clinical%20Characteristics%20to%20Document%20Malnutrition-White%20JV%20et%20al%202012.pdf    Height (cm): 177.8 (10-20-23 @ 12:18)  Weight (kg): 59 (10-20-23 @ 12:18)  BMI (kg/m2): 18.7 (10-20-23 @ 12:18)    [ ]PPSV2 < or = to 30% [ ]significant weight loss  [ ]poor nutritional intake  [ ]anasarca      [ ]Artificial Nutrition          Palliative Care Spiritual/Emotional Screening Tool Question  Severity (0-4):                    OR                    [X ] Unable to determine/NA  Score of 2 or greater indicates recommendation of Chaplaincy referral  Chaplaincy Referral: [ ] Yes [ ] Refused [ ] Following     Caregiver [place]:  [ ] Yes [ ] No    OR    [x ] Unable to determine. Will assess at later time if appropriate.  Social Work Referral [ ]  Patient and Family Centered Care Referral [ ]    Anticipatory Grief Present: [ ] Yes [ ] No    OR     [ x] Unable to determine. Will assess at later time if appropriate.  Social Work Referral [ ]  Patient and Family Centered Care Referral [ ]    REFERRALS:   [ ]Chaplaincy  [ ]Hospice  [ ]Child Life  [ ]Social Work  [ ]Case management [ ]Holistic Therapy     Palliative care education provided to patient and/or family    Goals of Care Document:

## 2023-10-25 NOTE — PROGRESS NOTE ADULT - SUBJECTIVE AND OBJECTIVE BOX
UROLOGY DAILY PROGRESS NOTE    Pt is a 72 y/o Male with Han mal function. Called to evaluate Han not draining and  not irrigating well. Pt lying in bed in NAD. Han not draining well, blood tinged urine.    MEDICATIONS  (STANDING):  aspirin enteric coated 81 milliGRAM(s) Oral daily  atorvastatin 40 milliGRAM(s) Oral at bedtime  chlorhexidine 2% Cloths 1 Application(s) Topical <User Schedule>  cloNIDine Patch 0.1 mG/24Hr(s) 1 patch Transdermal every 7 days  dextrose 5% + sodium chloride 0.9%. 1000 milliLiter(s) (75 mL/Hr) IV Continuous <Continuous>  enoxaparin Injectable 40 milliGRAM(s) SubCutaneous every 24 hours  levoFLOXacin IVPB      levoFLOXacin IVPB 750 milliGRAM(s) IV Intermittent once  scopolamine 1 mG/72 Hr(s) Patch 1 Patch Transdermal every 72 hours  tamsulosin 0.4 milliGRAM(s) Oral at bedtime    MEDICATIONS  (PRN):  hydrALAZINE Injectable 10 milliGRAM(s) IV Push every 8 hours PRN SBP > 160  OLANZapine 2.5 milliGRAM(s) Oral every 8 hours PRN agitation      REVIEW OF SYSTEMS   [X] Due to altered mental status/intubation, subjective information were not able to be obtained from patient. History was obtained, to the extent possible, from review of the chart and collateral sources of information.    Vital Signs Last 24 Hrs  T(C): 36 (25 Oct 2023 12:58), Max: 38.6 (25 Oct 2023 06:08)  T(F): 96.8 (25 Oct 2023 12:58), Max: 101.4 (25 Oct 2023 06:08)  HR: 95 (25 Oct 2023 12:58) (95 - 109)  BP: 132/67 (25 Oct 2023 12:58) (132/67 - 207/105)  RR: 18 (25 Oct 2023 12:58) (18 - 20)  SpO2: 96% (25 Oct 2023 12:58) (95% - 96%)    Parameters below as of 25 Oct 2023 12:58  Patient On (Oxygen Delivery Method): room air        PHYSICAL EXAM:    GEN: NAD, awake and alert.  SKIN: Good color, non diaphoretic.  RESP: Non-labored breathing. No use of accessory muscles.  ABDO: Soft, NT/ND, + palpable bladder, mild suprapubic tenderness  BACK: No CVAT B/L  : + uncircumcised male. B/L descended testicles x 2. No lesions or palpable masses  No meatal discharge. + Indwelling Han in place, not draining, blood tinged urine.   EXT: SILVEIRA x 4      I&O's Summary    24 Oct 2023 07:01  -  25 Oct 2023 07:00  --------------------------------------------------------  IN: 880 mL / OUT: 1250 mL / NET: -370 mL        LABS:                        11.6   12.09 )-----------( 190      ( 25 Oct 2023 08:25 )             34.4     10-25    134<L>  |  98  |  14  ----------------------------<  129<H>  3.8   |  21  |  0.9    Ca    8.5      25 Oct 2023 08:25    TPro  5.6<L>  /  Alb  3.2<L>  /  TBili  1.2  /  DBili  x   /  AST  16  /  ALT  9   /  AlkPhos  87  10-25    PT/INR - ( 25 Oct 2023 08:25 )   PT: 15.20 sec;   INR: 1.32 ratio         PTT - ( 25 Oct 2023 08:25 )  PTT:36.7 sec  Urinalysis Basic - ( 25 Oct 2023 15:13 )    Color: Red / Appearance: Turbid / SG: >1.030 / pH: x  Gluc: x / Ketone: Negative mg/dL  / Bili: Small / Urobili: 1.0 mg/dL   Blood: x / Protein: 300 mg/dL / Nitrite: Positive   Leuk Esterase: Moderate / RBC: x / WBC x   Sq Epi: x / Non Sq Epi: x / Bacteria: x      Prostate Ca Screen, PSA Total: 1.52 ng/mL (10-17 @ 15:41)        RADIOLOGY & ADDITIONAL STUDIES:

## 2023-10-25 NOTE — PROGRESS NOTE ADULT - ASSESSMENT
73M with no know prior PMH here with increasing weakness, agitation and inability to take care of himself. Found to have suspected dementia with behavioral disturbances, and possible UTI, started on zyprexa and UTI. Found to have acute CVA on MRI, on neurochecks, failed SLP eval as well, now NPO. Also with ARNULFO, likely pre-renal, improved with hydration. Palliative care consulted for [facility].

## 2023-10-25 NOTE — PROGRESS NOTE ADULT - SUBJECTIVE AND OBJECTIVE BOX
Neurology Progress Note    Interval History: Stroke code called this am due to increase in lethargy and AMS. Pt also had a temp of 101.4.  LKW was 7 pm yesterday. Pt has been taking ASA suppository 300 mg daily.     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 Niece also states his living environment is not safe for him at home and patient endorses being unable to walk and take care of himself. At time of encounter patient has grossly normal UE and LE strength, his speech is slurred, and he is slightly confused to questioning. Exam is otherwise normal, neuro-imaging was done which revealed no acute process in addition to age-indeterminate ischemic changes. The patient is admitted to medicine for weakness, confusion, and inability to care for self.  (16 Oct 2023 15:48)      PAST MEDICAL & SURGICAL HISTORY:  Hypertension, unspecified type    No significant past surgical history            Medications:  aspirin enteric coated 81 milliGRAM(s) Oral daily  atorvastatin 40 milliGRAM(s) Oral at bedtime  cloNIDine Patch 0.1 mG/24Hr(s) 1 patch Transdermal every 7 days  dextrose 5% + sodium chloride 0.45% with potassium chloride 20 mEq/L 1000 milliLiter(s) IV Continuous <Continuous>  hydrALAZINE Injectable 10 milliGRAM(s) IV Push every 8 hours PRN  OLANZapine 2.5 milliGRAM(s) Oral every 8 hours PRN  tamsulosin 0.4 milliGRAM(s) Oral at bedtime      Vital Signs Last 24 Hrs  T(C): 38.6 (25 Oct 2023 06:08), Max: 38.6 (25 Oct 2023 06:08)  T(F): 101.4 (25 Oct 2023 06:08), Max: 101.4 (25 Oct 2023 06:08)  HR: 109 (25 Oct 2023 04:40) (84 - 109)  BP: 139/71 (25 Oct 2023 04:40) (139/71 - 207/105)  BP(mean): --  RR: 18 (25 Oct 2023 04:40) (18 - 20)  SpO2: 95% (24 Oct 2023 21:25) (95% - 95%)    Parameters below as of 24 Oct 2023 21:25  Patient On (Oxygen Delivery Method): room air        Neurological Exam:   Mental status: lethargic and not oriented. Pt not speaking but able to follow some commands   Cranial nerves: +VT although decreased on peripheral of right eye.  no nystagmus, extraocular muscles intact, V1 through V3 intact bilaterally  face symmetric  Motor:  Normal tone and bulk.  No abnormal movements.  drift noted on right upper and lower extremity   Sensation: not responding to noxious stimulation   Gait: deferred     Labs:  CBC Full  -  ( 24 Oct 2023 07:55 )  WBC Count : 7.63 K/uL  RBC Count : 3.90 M/uL  Hemoglobin : 11.4 g/dL  Hematocrit : 35.7 %  Platelet Count - Automated : 170 K/uL  Mean Cell Volume : 91.5 fL  Mean Cell Hemoglobin : 29.2 pg  Mean Cell Hemoglobin Concentration : 31.9 g/dL  Auto Neutrophil # : x  Auto Lymphocyte # : x  Auto Monocyte # : x  Auto Eosinophil # : x  Auto Basophil # : x  Auto Neutrophil % : x  Auto Lymphocyte % : x  Auto Monocyte % : x  Auto Eosinophil % : x  Auto Basophil % : x    10-24    139  |  101  |  15  ----------------------------<  68<L>  3.8   |  23  |  0.7    Ca    8.8      24 Oct 2023 07:55    TPro  5.9<L>  /  Alb  3.6  /  TBili  0.7  /  DBili  x   /  AST  21  /  ALT  9   /  AlkPhos  85  10-24    LIVER FUNCTIONS - ( 24 Oct 2023 07:55 )  Alb: 3.6 g/dL / Pro: 5.9 g/dL / ALK PHOS: 85 U/L / ALT: 9 U/L / AST: 21 U/L / GGT: x             Urinalysis Basic - ( 24 Oct 2023 07:55 )    Color: x / Appearance: x / SG: x / pH: x  Gluc: 68 mg/dL / Ketone: x  / Bili: x / Urobili: x   Blood: x / Protein: x / Nitrite: x   Leuk Esterase: x / RBC: x / WBC x   Sq Epi: x / Non Sq Epi: x / Bacteria: x        < from: MR Head No Cont (10.20.23 @ 20:02) >    IMPRESSION:    Small scattered acute infarcts in the left frontal and parietal   subcortical white matter.    Moderate chronic microvascular ischemic changes and multiple scattered   chronic lacunar infarcts.    Multiple scattered foci of susceptibility signals which may represent   sequela of hypertensive microhemorrhage, amyloid angiopathy, or prior   inflammation or infection.    < end of copied text >   Neurology Progress Note    Interval History: Stroke code called this am due to increase in lethargy and AMS. Pt also had a temp of 101.4.  LKW was 7 pm yesterday. Pt has been having spikes in his BP. Pt has been taking ASA suppository 300 mg daily.     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 Niece also states his living environment is not safe for him at home and patient endorses being unable to walk and take care of himself. At time of encounter patient has grossly normal UE and LE strength, his speech is slurred, and he is slightly confused to questioning. Exam is otherwise normal, neuro-imaging was done which revealed no acute process in addition to age-indeterminate ischemic changes. The patient is admitted to medicine for weakness, confusion, and inability to care for self.  (16 Oct 2023 15:48)      PAST MEDICAL & SURGICAL HISTORY:  Hypertension, unspecified type    No significant past surgical history            Medications:  aspirin enteric coated 81 milliGRAM(s) Oral daily  atorvastatin 40 milliGRAM(s) Oral at bedtime  cloNIDine Patch 0.1 mG/24Hr(s) 1 patch Transdermal every 7 days  dextrose 5% + sodium chloride 0.45% with potassium chloride 20 mEq/L 1000 milliLiter(s) IV Continuous <Continuous>  hydrALAZINE Injectable 10 milliGRAM(s) IV Push every 8 hours PRN  OLANZapine 2.5 milliGRAM(s) Oral every 8 hours PRN  tamsulosin 0.4 milliGRAM(s) Oral at bedtime      Vital Signs Last 24 Hrs  T(C): 38.6 (25 Oct 2023 06:08), Max: 38.6 (25 Oct 2023 06:08)  T(F): 101.4 (25 Oct 2023 06:08), Max: 101.4 (25 Oct 2023 06:08)  HR: 109 (25 Oct 2023 04:40) (84 - 109)  BP: 139/71 (25 Oct 2023 04:40) (139/71 - 207/105)  BP(mean): --  RR: 18 (25 Oct 2023 04:40) (18 - 20)  SpO2: 95% (24 Oct 2023 21:25) (95% - 95%)    Parameters below as of 24 Oct 2023 21:25  Patient On (Oxygen Delivery Method): room air        Neurological Exam:   Mental status: lethargic and not oriented. Pt not speaking but able to follow some commands   Cranial nerves: +VT although decreased on peripheral of right eye.  no nystagmus, extraocular muscles intact, V1 through V3 intact bilaterally  face symmetric  Motor:  Normal tone and bulk.  No abnormal movements.  drift noted on right upper and lower extremity   Sensation: not responding to noxious stimulation   Gait: deferred     Labs:  CBC Full  -  ( 24 Oct 2023 07:55 )  WBC Count : 7.63 K/uL  RBC Count : 3.90 M/uL  Hemoglobin : 11.4 g/dL  Hematocrit : 35.7 %  Platelet Count - Automated : 170 K/uL  Mean Cell Volume : 91.5 fL  Mean Cell Hemoglobin : 29.2 pg  Mean Cell Hemoglobin Concentration : 31.9 g/dL  Auto Neutrophil # : x  Auto Lymphocyte # : x  Auto Monocyte # : x  Auto Eosinophil # : x  Auto Basophil # : x  Auto Neutrophil % : x  Auto Lymphocyte % : x  Auto Monocyte % : x  Auto Eosinophil % : x  Auto Basophil % : x    10-24    139  |  101  |  15  ----------------------------<  68<L>  3.8   |  23  |  0.7    Ca    8.8      24 Oct 2023 07:55    TPro  5.9<L>  /  Alb  3.6  /  TBili  0.7  /  DBili  x   /  AST  21  /  ALT  9   /  AlkPhos  85  10-24    LIVER FUNCTIONS - ( 24 Oct 2023 07:55 )  Alb: 3.6 g/dL / Pro: 5.9 g/dL / ALK PHOS: 85 U/L / ALT: 9 U/L / AST: 21 U/L / GGT: x             Urinalysis Basic - ( 24 Oct 2023 07:55 )    Color: x / Appearance: x / SG: x / pH: x  Gluc: 68 mg/dL / Ketone: x  / Bili: x / Urobili: x   Blood: x / Protein: x / Nitrite: x   Leuk Esterase: x / RBC: x / WBC x   Sq Epi: x / Non Sq Epi: x / Bacteria: x        < from: MR Head No Cont (10.20.23 @ 20:02) >    IMPRESSION:    Small scattered acute infarcts in the left frontal and parietal   subcortical white matter.    Moderate chronic microvascular ischemic changes and multiple scattered   chronic lacunar infarcts.    Multiple scattered foci of susceptibility signals which may represent   sequela of hypertensive microhemorrhage, amyloid angiopathy, or prior   inflammation or infection.    < end of copied text >    Lipid Profile in AM (10.18.23 @ 09:00)    Cholesterol: 167 mg/dL   Triglycerides, Serum: 61 mg/dL   HDL Cholesterol: 51 mg/dL   Non HDL Cholesterol: 116: Patients Atherosclerotic Cardiovascular Disease (ASCVD) Risk  Optimal Level (mg/dL)  LDL Cholesterol (LDL-C)  All Patients                                < 100  ASCVD at Very High Risk1    < 70  Non-HDL Cholesterol (Non-HDL-C)  All Patients                       < 130  ASCVD at Very High Risk1   < 100  Non-HDL-Cholesterol (Non-HDL-C) is also a key target for cardiovascular  risk reduction.  Consider Familial Hypercholesterolemia when: LDL-C > 190 mg/dL or  Non-HDL-C > 220 mg/dL.  LDL-C calculation using the Friedewald equation is not provided when  triglycerides > 400 mg/dL, in which case we recommend repeating the test  after fasting, if it was not done before.  When triglycerides >150 mg/dL, calculated LDL-C is provided but maystill  be inaccurate (particularly when LDL-C < 70 mg/dL). It can be  recalculated off-line using other equations (e.g. Ron SS, Ayaan MJ,  Esther MB, et al.MARY ANN 2013;310:2061 - 8).  1 Jacobo Douglass,et al. "2019 AHA/ACC. . . guideline on the  management of blood cholesterol: a report of the American College of  Cardiology/American Heart Association Task Force on Clinical Practice  Guidelines." Circulation;139:e1082 - e1143.  These values apply only to persons 20 years and older.  Lipid Panel updated with new test, reference ranges and interpretive  comments effective 10-. mg/dL   LDL Cholesterol Calculated: 105 mg/dL    A1C with Estimated Average Glucose in AM (10.18.23 @ 09:00)    A1C with Estimated Average Glucose Result: 5.8: Method: Immunoassay       Reference Range                4.0-5.6%       High risk (prediabetic)        5.7-6.4%       Diabetic, diagnostic             >=6.5%       ADA diabetic treatment goal       <7.0%  The Hemoglobin A1c testing is NGSP-certified.Reference ranges are based  upon the 2010 recommendations of  the American Diabetes Association.  Interpretation may vary for children  and adolescents. %   Estimated Average Glucose: 120: The Estimated Average Glucose (eAG) or Mean Plasma Glucose (MPG) value is  calculated from the hemoglobin A1c value and covers the same time period.   The American Diabetes Association (ADA) and other professional  organizations recommend reporting the eAG with the HgbA1c. mg/dL

## 2023-10-25 NOTE — PROGRESS NOTE ADULT - SUBJECTIVE AND OBJECTIVE BOX
72y/o male with no PMH brought in by niece for concerns of increasing weakness, agitation and inability to take care of himself. He did not see doctors for years, was found to have uncontrolled HTN, urinary retention diagnosed with acute ischemic CVA on this admission.   This morning pt was obtunded did not react to sternal rub, stroke code was called, later on he wake up, was answering  questions and following commands.     PAST MEDICAL & SURGICAL HISTORY:  Hypertension, unspecified type  No significant past surgical history    Vital Signs Last 24 Hrs  T(C): 36 (25 Oct 2023 12:58), Max: 38.6 (25 Oct 2023 06:08)  T(F): 96.8 (25 Oct 2023 12:58), Max: 101.4 (25 Oct 2023 06:08)  HR: 95 (25 Oct 2023 12:58) (84 - 109)  BP: 132/67 (25 Oct 2023 12:58) (132/67 - 207/105)  BP(mean): --  RR: 18 (25 Oct 2023 12:58) (18 - 20)  SpO2: 95% (24 Oct 2023 21:25) (95% - 95%)    Parameters below as of 24 Oct 2023 21:25  Patient On (Oxygen Delivery Method): room air      PHYSICAL EXAM:  GENERAL: NAD   HEAD:  Atraumatic, Normocephalic  EYES:  conjunctiva and sclera clear  NECK: Supple, No JVD  CHEST/LUNG: decreased BS at base   HEART: Regular rate and rhythm; No murmurs, rubs, or gallops  ABDOMEN: Soft, Nontender, Nondistended; Bowel sounds present  NORIEGA cath noted with bloody urine   EXTREMITIES:  2+ Peripheral Pulses, No clubbing, cyanosis, or edema  NEUROLOGY: awake, answering questions, demented  moving all extremities, follows commands, speech slowed  SKIN: No rashes or lesions    Labs Reviewed                          11.6 12.09 )-----------( 190      ( 25 Oct 2023 08:25 )             34.4   10-25    134<L>  |  98  |  14  ----------------------------<  129<H>  3.8   |  21  |  0.9    Ca    8.5      25 Oct 2023 08:25    TPro  5.6<L>  /  Alb  3.2<L>  /  TBili  1.2  /  DBili  x   /  AST  16  /  ALT  9   /  AlkPhos  87  10-25    Culture - Blood in AM (10.23.23 @ 07:34)   Specimen Source: .Blood None  Culture Results:   No growth at 24 hours    Culture - Urine (10.22.23 @ 11:20)   Specimen Source: Catheterized Catheterized  Culture Results:   No growth    RADIOLOGY:   < from: Xray Chest 1 View- PORTABLE-Routine (Xray Chest 1 View- PORTABLE-Routine .) (10.25.23 @ 09:12) >  Impression:    Left lower lobe pneumonia, new.    < end of copied text >  < from: CT Angio Neck Stroke Protocol w/ IV Cont (10.25.23 @ 08:10) >  IMPRESSION:    CT PERFUSION:  Motion degraded exam with small region of delayed perfusion in the right   parietal lobe along the MCA territory and in the left CP angle cistern   region (likely artifactual) measuring 32 mL. No perfusion evidence of   core infarct.    CTA HEAD:  Stable high-grade stenosis/occlusion in the V4 segment of the left   vertebral artery.    Stable multivessel multifocal mild stenosis affecting M2/M3 branches of   the bilateral MCAs.    Stable irregular mild stenosis affecting bilateral P1/P2 PCAs.    CTA NECK:  Stable moderate atherosclerotic stenosis in the left carotid bulb (60%).    Stable multisegmental irregular stenosis affecting left vertebral artery   with high-grade stenosis in the ostium.    Partially imaged patchy consolidation in the left upper and lower lobes,   new since the prior exam. The findings likely infectious or inflammatory   in etiology.    < from: TTE Echo Complete w/o Contrast w/ Doppler (10.22.23 @ 08:57) >    Summary:   1. Technically difficult study.   2. Normal global left ventricular systolic function with a biplane EF of   65%. Indeterminate diastolic function. Cannot exclude regional wall   motion abnormalities due to poor endocardial visualization.   3. Normal right ventricular size and function.   4. Normal left atrial size.   5. Mild mitral valve regurgitation.   6. No echocardiographic evidence of pulmonary hypertension.   7. There is no evidence of pericardial effusion.    MEDICATIONS  (STANDING):  ampicillin/sulbactam  IVPB      ampicillin/sulbactam  IVPB 3 Gram(s) IV Intermittent every 6 hours  aspirin enteric coated 81 milliGRAM(s) Oral daily  atorvastatin 40 milliGRAM(s) Oral at bedtime  chlorhexidine 2% Cloths 1 Application(s) Topical <User Schedule>  cloNIDine Patch 0.1 mG/24Hr(s) 1 patch Transdermal every 7 days  dextrose 5% + sodium chloride 0.45% with potassium chloride 20 mEq/L 1000 milliLiter(s) (80 mL/Hr) IV Continuous <Continuous>  scopolamine 1 mG/72 Hr(s) Patch 1 Patch Transdermal every 72 hours  tamsulosin 0.4 milliGRAM(s) Oral at bedtime    MEDICATIONS  (PRN):  hydrALAZINE Injectable 10 milliGRAM(s) IV Push every 8 hours PRN SBP > 160  OLANZapine 2.5 milliGRAM(s) Oral every 8 hours PRN agitation

## 2023-10-25 NOTE — PROGRESS NOTE ADULT - ASSESSMENT
74y/o male with no PMH brought in by niece for concerns of increasing weakness, agitation and inability to take care of himself.     A/P   #Acute  embolic CVA/ AMS/ suspected metabolic encephalopathy high grade fever   - f/u repeat HCT   - Brain MRI: small scattered acute infarcts in the left frontal and parietal subcortical white matter.  - CTA neck: Moderate stenosis (50%) in the proximal left cervical ICA resulting in moderate. Mild focal stenosis at the origin of the right vertebral artery.   - c/w remote telemetry - no events so far   - TTE noted   - c/w neuro checks Q 6 hours, neurology follow up   - pt  needs  DAPT x 21 days but pt failed speech and swallow, family refused NGT   - speech and swallow planned barium swallow today, canceled due to lethargy in the morning   - supportive care   - pt is spiking high grade fever, CXR showed new infiltrate, originally started on Unasyn for suspected aspiration but CXR showed LLL infiltrate, will change Abx to Levofloxacin   - supportive care, prevent fall and aspiration   - Palliative consult pending     #Vascular  Dementia with behavioural disturbances   - Reversible causes of dementia ruled out - CT Head negative, b12, folate, TSH, VDRL   - MRI c spine negative   - MRI brain showed acute CVA   - c/w Zyprexa PRN for agitation   - pt completed the course of  Ampicillin for UTI    - consulted by  Psych, recommendations noted     #Urinary retention /Gross Hematuria  /BPH  - IV hydration for now, monitor urine output   - get kidney/ bladder US ( pt developed gross hematuria)   - passed TOV 10/19, now retaining again, mccormick placed again 10/23  - c/w Flomax (once has NGT)   - PSA level wnl      #ARNULFO  - suspect pre-renal   - resolved after hydration     # Isolated elevated ALP  - follow up outpatient     DVT ppx     Pending:  start IV Levofloxacin for LLE PNA, monitor fever curve, send pan Cx if pt'll spike high grade fever, IV hydration , monitor urine output, get kidney/bladder US ( pt developed gross hematuria), possible transfer to [facility] , NPO for now, palliative, possible NGT/PEG, neurology follow up.   Plan of care d/w pt's family member at the bedside ( nephew)  Dispo: TBD

## 2023-10-25 NOTE — PROGRESS NOTE ADULT - SUBJECTIVE AND OBJECTIVE BOX
24H events:    Patient is a 73y old Male who presents with a chief complaint of Weakness, confusion, inability to ambulate (25 Oct 2023 12:27)    Primary diagnosis of Weakness      Today is hospital day 9d. This morning patient was seen and examined at bedside, resting in bed. Patient was more obtunded and less responsive than yesterday. Patient did not respond to verbal commands and did not wake to sternal rub. Stroke code was called by nurse. Patient was observed to spontaneously raise arm and make vocalizations. Of note, overnight patient was febrile with T of 101.3 and given IV tylenol. He was also given 10mg hydralazine push for hyptertension. CXR revealed left lower lung opacity and mild increase in WBC at 12. No remarkable findings on head imagine for stroke protocol work up. Other labs unremarkable. Per daughter Mickie Mejia, family would like PEG tube placement.     PAST MEDICAL & SURGICAL HISTORY  Hypertension, unspecified type    No significant past surgical history      SOCIAL HISTORY:  Social History:      ALLERGIES:  No Known Allergies    MEDICATIONS:  STANDING MEDICATIONS  ampicillin/sulbactam  IVPB 3 Gram(s) IV Intermittent every 6 hours  ampicillin/sulbactam  IVPB      aspirin enteric coated 81 milliGRAM(s) Oral daily  atorvastatin 40 milliGRAM(s) Oral at bedtime  chlorhexidine 2% Cloths 1 Application(s) Topical <User Schedule>  cloNIDine Patch 0.1 mG/24Hr(s) 1 patch Transdermal every 7 days  dextrose 5% + sodium chloride 0.45% with potassium chloride 20 mEq/L 1000 milliLiter(s) IV Continuous <Continuous>  scopolamine 1 mG/72 Hr(s) Patch 1 Patch Transdermal every 72 hours  tamsulosin 0.4 milliGRAM(s) Oral at bedtime    PRN MEDICATIONS  hydrALAZINE Injectable 10 milliGRAM(s) IV Push every 8 hours PRN  OLANZapine 2.5 milliGRAM(s) Oral every 8 hours PRN    VITALS:   T(F): 96.8  HR: 95  BP: 132/67  RR: 18  SpO2: 95%    PHYSICAL EXAM:  GENERAL: NAD, lying in bed, obtunded, lethargic,  elderly with mccormick and SCDs  HEAD: NCAD, no hematoma or laceration   NECK: Supple,    HEART: Regular rate and rhythm, normal S1/S2, no murmurs, heaves, thrills  LUNGS: No acute respiratory distress, clear b/l breath sounds  ABDOMEN:  soft, non-tender, non-distended,   EXTREMITIES: no rashes, extremities warm/dry, no cyanosis, no edema,  NERVOUS SYSTEM:  Obtunded, unresponsive to commands, Significant interval decline in mental status   SKIN: No rashes or lesions         LABS:                        11.6   12.09 )-----------( 190      ( 25 Oct 2023 08:25 )             34.4     10-25    134<L>  |  98  |  14  ----------------------------<  129<H>  3.8   |  21  |  0.9    Ca    8.5      25 Oct 2023 08:25    TPro  5.6<L>  /  Alb  3.2<L>  /  TBili  1.2  /  DBili  x   /  AST  16  /  ALT  9   /  AlkPhos  87  10-25    PT/INR - ( 25 Oct 2023 08:25 )   PT: 15.20 sec;   INR: 1.32 ratio         PTT - ( 25 Oct 2023 08:25 )  PTT:36.7 sec  Urinalysis Basic - ( 25 Oct 2023 08:25 )    Color: x / Appearance: x / SG: x / pH: x  Gluc: 129 mg/dL / Ketone: x  / Bili: x / Urobili: x   Blood: x / Protein: x / Nitrite: x   Leuk Esterase: x / RBC: x / WBC x   Sq Epi: x / Non Sq Epi: x / Bacteria: x        Creatine Kinase, Serum: 49 U/L (10-25-23 @ 08:25)  Troponin T, Serum: <0.01 ng/mL (10-25-23 @ 08:25)  Lactate, Blood: 1.1 mmol/L (10-25-23 @ 08:25)      Culture - Blood (collected 23 Oct 2023 07:34)  Source: .Blood None  Preliminary Report (24 Oct 2023 14:02):    No growth at 24 hours    Culture - Blood (collected 22 Oct 2023 17:18)  Source: .Blood None  Preliminary Report (25 Oct 2023 01:01):    No growth at 48 Hours      CARDIAC MARKERS ( 25 Oct 2023 08:25 )  x     / <0.01 ng/mL / 49 U/L / x     / 1.5 ng/mL      RADIOLOGY:    RADIOLOGY     24H events:    Patient is a 73y old Male who presents with a chief complaint of Weakness, confusion, inability to ambulate (25 Oct 2023 12:27)    Primary diagnosis of Weakness      Today is hospital day 9d. This morning patient was seen and examined at bedside, resting in bed. Patient was more obtunded and less responsive than yesterday. Patient did not respond to verbal commands and did not wake to sternal rub. Stroke code was called by nurse. Patient was observed to spontaneously raise arm and make vocalizations. Of note, overnight patient was febrile with T of 101.3 and given IV tylenol. He was also given 10mg hydralazine push for hyptertension. CXR revealed left lower lung opacity and mild increase in WBC at 12. No remarkable findings on head imagine for stroke protocol work up. Other labs unremarkable. Per niece Mickie Mejia, family would like PEG tube placement.     PAST MEDICAL & SURGICAL HISTORY  Hypertension, unspecified type    No significant past surgical history      SOCIAL HISTORY:  Social History:      ALLERGIES:  No Known Allergies    MEDICATIONS:  STANDING MEDICATIONS  ampicillin/sulbactam  IVPB 3 Gram(s) IV Intermittent every 6 hours  ampicillin/sulbactam  IVPB      aspirin enteric coated 81 milliGRAM(s) Oral daily  atorvastatin 40 milliGRAM(s) Oral at bedtime  chlorhexidine 2% Cloths 1 Application(s) Topical <User Schedule>  cloNIDine Patch 0.1 mG/24Hr(s) 1 patch Transdermal every 7 days  dextrose 5% + sodium chloride 0.45% with potassium chloride 20 mEq/L 1000 milliLiter(s) IV Continuous <Continuous>  scopolamine 1 mG/72 Hr(s) Patch 1 Patch Transdermal every 72 hours  tamsulosin 0.4 milliGRAM(s) Oral at bedtime    PRN MEDICATIONS  hydrALAZINE Injectable 10 milliGRAM(s) IV Push every 8 hours PRN  OLANZapine 2.5 milliGRAM(s) Oral every 8 hours PRN    VITALS:   T(F): 96.8  HR: 95  BP: 132/67  RR: 18  SpO2: 95%    PHYSICAL EXAM:  GENERAL: NAD, lying in bed, obtunded, lethargic,  elderly with mccormick and SCDs  HEAD: NCAD, no hematoma or laceration   NECK: Supple,    HEART: Regular rate and rhythm, normal S1/S2, no murmurs, heaves, thrills  LUNGS: No acute respiratory distress, clear b/l breath sounds  ABDOMEN:  soft, non-tender, non-distended,   EXTREMITIES: no rashes, extremities warm/dry, no cyanosis, no edema,  NERVOUS SYSTEM:  Obtunded, unresponsive to commands, Significant interval decline in mental status   SKIN: No rashes or lesions         LABS:                        11.6   12.09 )-----------( 190      ( 25 Oct 2023 08:25 )             34.4     10-25    134<L>  |  98  |  14  ----------------------------<  129<H>  3.8   |  21  |  0.9    Ca    8.5      25 Oct 2023 08:25    TPro  5.6<L>  /  Alb  3.2<L>  /  TBili  1.2  /  DBili  x   /  AST  16  /  ALT  9   /  AlkPhos  87  10-25    PT/INR - ( 25 Oct 2023 08:25 )   PT: 15.20 sec;   INR: 1.32 ratio         PTT - ( 25 Oct 2023 08:25 )  PTT:36.7 sec  Urinalysis Basic - ( 25 Oct 2023 08:25 )    Color: x / Appearance: x / SG: x / pH: x  Gluc: 129 mg/dL / Ketone: x  / Bili: x / Urobili: x   Blood: x / Protein: x / Nitrite: x   Leuk Esterase: x / RBC: x / WBC x   Sq Epi: x / Non Sq Epi: x / Bacteria: x        Creatine Kinase, Serum: 49 U/L (10-25-23 @ 08:25)  Troponin T, Serum: <0.01 ng/mL (10-25-23 @ 08:25)  Lactate, Blood: 1.1 mmol/L (10-25-23 @ 08:25)      Culture - Blood (collected 23 Oct 2023 07:34)  Source: .Blood None  Preliminary Report (24 Oct 2023 14:02):    No growth at 24 hours    Culture - Blood (collected 22 Oct 2023 17:18)  Source: .Blood None  Preliminary Report (25 Oct 2023 01:01):    No growth at 48 Hours      CARDIAC MARKERS ( 25 Oct 2023 08:25 )  x     / <0.01 ng/mL / 49 U/L / x     / 1.5 ng/mL      RADIOLOGY:    RADIOLOGY

## 2023-10-25 NOTE — CHART NOTE - NSCHARTNOTEFT_GEN_A_CORE
0700 Rn to provider: Patient is obtunded.    Patient found to be less responsive and weaker than yesterday.   Vitals signs: hypertensive with sBP 150s, satting 95%, with HR in the 90s.   Unresponsive to sternal rubs or commands for neuro exam. Patient observed to spontaneously raise arm and make vocalizations. Stroke code was called.  Family contacted (spoke with Mickie Mejia over phone)    CXR, ECG, CT head neck and angio ordered.  Blood drawn for blood cultures, lactate, CBC, CMP, t&s, CK, APTT, PT/INR, trops ordered.    Patient hemodynamically stable. Taken to CT. Of note, patient spiked fever at 0430 of 101.3 and was given IV acetaminophen and was given 1x 10mg hydralazine for BP >160 overnight. Patient did not receive haldol 0700 Rn to provider: Patient is obtunded.    Patient found to be less responsive and weaker than yesterday.   Vitals signs: hypertensive with sBP 150s, satting 95%, with HR in the 90s.   Unresponsive to sternal rubs or commands for neuro exam. Patient observed to spontaneously raise arm and make vocalizations. Stroke code was called.  Family contacted (spoke with Niece Mickie Mejia over phone)    CXR, ECG, CT head neck and angio ordered.  Blood drawn for blood cultures, lactate, CBC, CMP, t&s, CK, APTT, PT/INR, trops ordered.    Patient hemodynamically stable. Taken to CT. Of note, patient spiked fever at 0430 of 101.3 and was given IV acetaminophen and was given 1x 10mg hydralazine for BP >160 overnight. Patient did not receive haldol

## 2023-10-26 NOTE — PROGRESS NOTE ADULT - SUBJECTIVE AND OBJECTIVE BOX
24H events:    Patient is a 73y old Male who presents with a chief complaint of Weakness, confusion, inability to ambulate (25 Oct 2023 15:27)    Primary diagnosis of Weakness    Today is hospital day 10d. This morning patient was seen and examined at bedside, resting comfortably in bed. Patient looks much improved and is responsive to questions and reacts to verbal commands. No acute or major events overnight. Hemodynamically stable, Still NPO, minimal urine output,   PAST MEDICAL & SURGICAL HISTORY  Hypertension, unspecified type    No significant past surgical history      SOCIAL HISTORY:  Social History:      ALLERGIES:  No Known Allergies    MEDICATIONS:  STANDING MEDICATIONS  aspirin Suppository 81 milliGRAM(s) Rectal daily  atorvastatin 40 milliGRAM(s) Oral at bedtime  chlorhexidine 2% Cloths 1 Application(s) Topical <User Schedule>  cloNIDine Patch 0.1 mG/24Hr(s) 1 patch Transdermal every 7 days  dextrose 5% + sodium chloride 0.9%. 1000 milliLiter(s) IV Continuous <Continuous>  enoxaparin Injectable 40 milliGRAM(s) SubCutaneous every 24 hours  levoFLOXacin IVPB      levoFLOXacin IVPB 750 milliGRAM(s) IV Intermittent every 24 hours  scopolamine 1 mG/72 Hr(s) Patch 1 Patch Transdermal every 72 hours  tamsulosin 0.4 milliGRAM(s) Oral at bedtime    PRN MEDICATIONS  hydrALAZINE Injectable 10 milliGRAM(s) IV Push every 8 hours PRN  OLANZapine 2.5 milliGRAM(s) Oral every 8 hours PRN    VITALS:   T(F): 98.2  HR: 91  BP: 110/57  RR: 17  SpO2: 95%    PHYSICAL EXAM:  GENERAL: NAD, lying in bed comfortably, cooperative  HEAD: NCAD,   NECK: Supple,   HEART: Regular rate and rhythm, normal S1/S2,   LUNGS: No acute respiratory distress, clear b/l breath sounds,   ABDOMEN:  soft, non-tender, non-distended,    EXTREMITIES: no rashes, extremities warm/dry, no cyanosis, no edema, ulcerations or ecchymosis  NERVOUS SYSTEM:  follows commands, answers questions appropriately   SKIN: No rashes or lesions       LABS:                        11.6   12.09 )-----------( 190      ( 25 Oct 2023 08:25 )             34.4     10-25    134<L>  |  98  |  14  ----------------------------<  129<H>  3.8   |  21  |  0.9    Ca    8.5      25 Oct 2023 08:25    TPro  5.6<L>  /  Alb  3.2<L>  /  TBili  1.2  /  DBili  x   /  AST  16  /  ALT  9   /  AlkPhos  87  10-25    PT/INR - ( 25 Oct 2023 08:25 )   PT: 15.20 sec;   INR: 1.32 ratio         PTT - ( 25 Oct 2023 08:25 )  PTT:36.7 sec  Urinalysis Basic - ( 25 Oct 2023 15:13 )    Color: Red / Appearance: Turbid / SG: >1.030 / pH: x  Gluc: x / Ketone: Negative mg/dL  / Bili: Small / Urobili: 1.0 mg/dL   Blood: x / Protein: 300 mg/dL / Nitrite: Positive   Leuk Esterase: Moderate / RBC: >1900 /HPF / WBC 8 /HPF   Sq Epi: x / Non Sq Epi: 2 /HPF / Bacteria: Negative /HPF            CARDIAC MARKERS ( 25 Oct 2023 08:25 )  x     / <0.01 ng/mL / 49 U/L / x     / 1.5 ng/mL      RADIOLOGY:    RADIOLOGY     24H events:    Patient is a 73y old Male who presents with a chief complaint of Weakness, confusion, inability to ambulate (25 Oct 2023 15:27)    Primary diagnosis of Weakness    Today is hospital day 10d. This morning patient was seen and examined at bedside, resting comfortably in bed. Patient looks much improved and is responsive to questions and reacts to verbal commands. No acute or major events overnight. Hemodynamically stable, Still NPO, minimal urine output,   PAST MEDICAL & SURGICAL HISTORY  Hypertension, unspecified type    No significant past surgical history      SOCIAL HISTORY:  Social History:      ALLERGIES:  No Known Allergies    MEDICATIONS:  STANDING MEDICATIONS  aspirin Suppository 81 milliGRAM(s) Rectal daily  atorvastatin 40 milliGRAM(s) Oral at bedtime  chlorhexidine 2% Cloths 1 Application(s) Topical <User Schedule>  cloNIDine Patch 0.1 mG/24Hr(s) 1 patch Transdermal every 7 days  dextrose 5% + sodium chloride 0.9%. 1000 milliLiter(s) IV Continuous <Continuous>  enoxaparin Injectable 40 milliGRAM(s) SubCutaneous every 24 hours  levoFLOXacin IVPB      levoFLOXacin IVPB 750 milliGRAM(s) IV Intermittent every 24 hours  scopolamine 1 mG/72 Hr(s) Patch 1 Patch Transdermal every 72 hours  tamsulosin 0.4 milliGRAM(s) Oral at bedtime    PRN MEDICATIONS  hydrALAZINE Injectable 10 milliGRAM(s) IV Push every 8 hours PRN  OLANZapine 2.5 milliGRAM(s) Oral every 8 hours PRN    VITALS:   T(F): 98.2  HR: 91  BP: 110/57  RR: 17  SpO2: 95%    PHYSICAL EXAM:  GENERAL: NAD, lying in bed comfortably, cooperative  HEAD: NCAD,   NECK: Supple,   HEART: Regular rate and rhythm, normal S1/S2,   LUNGS: No acute respiratory distress, clear b/l breath sounds,   ABDOMEN:  soft, non-tender, non-distended,    EXTREMITIES: no rashes, extremities warm/dry, no cyanosis, no edema, ulcerations or ecchymosis  NERVOUS SYSTEM: interval improvement in strength. follows commands, answers questions appropriately   SKIN: No rashes or lesions       LABS:                        11.6   12.09 )-----------( 190      ( 25 Oct 2023 08:25 )             34.4     10-25    134<L>  |  98  |  14  ----------------------------<  129<H>  3.8   |  21  |  0.9    Ca    8.5      25 Oct 2023 08:25    TPro  5.6<L>  /  Alb  3.2<L>  /  TBili  1.2  /  DBili  x   /  AST  16  /  ALT  9   /  AlkPhos  87  10-25    PT/INR - ( 25 Oct 2023 08:25 )   PT: 15.20 sec;   INR: 1.32 ratio         PTT - ( 25 Oct 2023 08:25 )  PTT:36.7 sec  Urinalysis Basic - ( 25 Oct 2023 15:13 )    Color: Red / Appearance: Turbid / SG: >1.030 / pH: x  Gluc: x / Ketone: Negative mg/dL  / Bili: Small / Urobili: 1.0 mg/dL   Blood: x / Protein: 300 mg/dL / Nitrite: Positive   Leuk Esterase: Moderate / RBC: >1900 /HPF / WBC 8 /HPF   Sq Epi: x / Non Sq Epi: 2 /HPF / Bacteria: Negative /HPF            CARDIAC MARKERS ( 25 Oct 2023 08:25 )  x     / <0.01 ng/mL / 49 U/L / x     / 1.5 ng/mL      RADIOLOGY:    RADIOLOGY

## 2023-10-26 NOTE — PHARMACOTHERAPY INTERVENTION NOTE - COMMENTS
Patient currently NPO and therefore on rectal aspirin 300 mg s/p CVA, recommended to resume aspirin 81 mg once patient can tolerate PO meds and is on DAPT.

## 2023-10-26 NOTE — SWALLOW VFSS/MBS ASSESSMENT ADULT - DIAGNOSTIC IMPRESSIONS
mild-mod pharyngeal dysphagia; pt likely will not consume enough by mouth and should consider supplemental means of nutrition/hydration

## 2023-10-26 NOTE — PROGRESS NOTE ADULT - ASSESSMENT
74y/o male with no PMH brought in by niece for concerns of increasing weakness, agitation and inability to take care of himself.     A/P   #Acute  embolic CVA/ AMS/ suspected metabolic encephalopathy high grade fever   - repeat HCT is negative for acute pathology.  - Brain MRI: small scattered acute infarcts in the left frontal and parietal subcortical white matter.  - CTA neck: Moderate stenosis (50%) in the proximal left cervical ICA resulting in moderate. Mild focal stenosis at the origin of the right vertebral artery.   - c/w remote telemetry - no events so far   - TTE noted   - c/w neuro checks Q 6 hours, neurology follow up   - pt  needs  DAPT x 21 days but pt failed speech and swallow, family refused NGT   - speech and swallow planned barium swallow today, canceled due to lethargy in the morning   - supportive care   - pt is spiking high grade fever, CXR showed LLL infiltrate, started on Levofloxacin on 10/25  - supportive care, prevent fall and aspiration   - Palliative consult pending     # Dysphagia  - s/p barium swallow today, f/u speech and swallow recommendations, if pt'll failed consult GI for PEG tube   - aspiration precautions     #Vascular  Dementia with behavioural disturbances   - Reversible causes of dementia ruled out - CT Head negative, b12, folate, TSH, VDRL   - MRI c spine negative   - MRI brain showed acute CVA   - c/w Zyprexa PRN for agitation   - pt completed the course of  Ampicillin for UTI    - consulted by  Psych, recommendations noted     #Urinary retention /Gross Hematuria  /BPH  - c/w IV hydration , monitor urine output   - get kidney/ bladder US noted   - passed TOV 10/19, now retaining again, mccormick placed again 10/23  - c/w Flomax (once has NGT)   - PSA level wnl    - followed up on 10/25 and flushed Mccormick, hematuria resolved     #ARNULFO  - suspect pre-renal   - resolved after hydration     # Isolated elevated ALP  - follow up outpatient     DVT ppx     Pending:  f/u speech and swallow recs after barium swallow, if pt failed, consult GI for PEG, possible transfer to [facility] , NPO for now, c/w IV fluids.   Plan of care d/w pt's family member at the bedside on 10/25, will reach out to family today   Dispo: TBD

## 2023-10-26 NOTE — SWALLOW VFSS/MBS ASSESSMENT ADULT - ROSENBEK'S PENETRATION ASPIRATION SCALE
(3) contrast remains above the vocal cords, visible residue remains (penetration) (7) contrast passes glottis, visible subglottic residue remains despite patient’s response (aspiration)

## 2023-10-26 NOTE — PROGRESS NOTE ADULT - SUBJECTIVE AND OBJECTIVE BOX
74y/o male with no PMH brought in by niece for concerns of increasing weakness, agitation and inability to take care of himself. He did not see doctors for years, was found to have uncontrolled HTN, urinary retention diagnosed with acute ischemic CVA on this admission.   Today pt is answering  questions and following commands, denies pain, has no specific complaints.     PAST MEDICAL & SURGICAL HISTORY:  Hypertension, unspecified type  No significant past surgical history    Vital Signs Last 24 Hrs  T(C): 36.8 (26 Oct 2023 05:43), Max: 37.3 (25 Oct 2023 19:55)  T(F): 98.2 (26 Oct 2023 05:43), Max: 99.1 (25 Oct 2023 19:55)  HR: 91 (26 Oct 2023 06:12) (83 - 97)  BP: 110/57 (26 Oct 2023 06:12) (110/57 - 162/89)  BP(mean): --  RR: 17 (26 Oct 2023 06:12) (17 - 18)  SpO2: 95% (26 Oct 2023 07:57) (95% - 96%)    Parameters below as of 26 Oct 2023 07:57  Patient On (Oxygen Delivery Method): room air      PHYSICAL EXAM:  GENERAL: NAD   HEAD:  Atraumatic, Normocephalic  EYES:  conjunctiva and sclera clear  NECK: Supple, No JVD  CHEST/LUNG: decreased BS at base   HEART: Regular rate and rhythm; No murmurs, rubs, or gallops  ABDOMEN: Soft, Nontender, Nondistended; Bowel sounds present  NORIEGA cath noted with bloody urine   EXTREMITIES:  2+ Peripheral Pulses, No clubbing, cyanosis, or edema  NEUROLOGY: awake, answering questions, demented  moving all extremities, follows commands, speech slowed  SKIN: No rashes or lesions    Labs Reviewed                          10.8   11.66 )-----------( 163      ( 26 Oct 2023 08:22 )             32.5   10-26    139  |  104  |  20  ----------------------------<  123<H>  3.6   |  24  |  0.8    Ca    8.7      26 Oct 2023 08:22    TPro  5.1<L>  /  Alb  3.0<L>  /  TBili  0.7  /  DBili  x   /  AST  14  /  ALT  10  /  AlkPhos  82  10-26    Culture - Blood in AM (10.23.23 @ 07:34)   Specimen Source: .Blood None  Culture Results:   No growth at 24 hours    Culture - Urine (10.22.23 @ 11:20)   Specimen Source: Catheterized Catheterized  Culture Results:   No growth    RADIOLOGY:   < from: Xray Chest 1 View- PORTABLE-Routine (Xray Chest 1 View- PORTABLE-Routine .) (10.25.23 @ 09:12) >  Impression:    Left lower lobe pneumonia, new.    < end of copied text >  < from: CT Angio Neck Stroke Protocol w/ IV Cont (10.25.23 @ 08:10) >  IMPRESSION:    CT PERFUSION:  Motion degraded exam with small region of delayed perfusion in the right   parietal lobe along the MCA territory and in the left CP angle cistern   region (likely artifactual) measuring 32 mL. No perfusion evidence of   core infarct.    CTA HEAD:  Stable high-grade stenosis/occlusion in the V4 segment of the left   vertebral artery.    Stable multivessel multifocal mild stenosis affecting M2/M3 branches of   the bilateral MCAs.    Stable irregular mild stenosis affecting bilateral P1/P2 PCAs.    CTA NECK:  Stable moderate atherosclerotic stenosis in the left carotid bulb (60%).    Stable multisegmental irregular stenosis affecting left vertebral artery   with high-grade stenosis in the ostium.    Partially imaged patchy consolidation in the left upper and lower lobes,   new since the prior exam. The findings likely infectious or inflammatory   in etiology.    < from: TTE Echo Complete w/o Contrast w/ Doppler (10.22.23 @ 08:57) >    Summary:   1. Technically difficult study.   2. Normal global left ventricular systolic function with a biplane EF of   65%. Indeterminate diastolic function. Cannot exclude regional wall   motion abnormalities due to poor endocardial visualization.   3. Normal right ventricular size and function.   4. Normal left atrial size.   5. Mild mitral valve regurgitation.   6. No echocardiographic evidence of pulmonary hypertension.   7. There is no evidence of pericardial effusion.    MEDICATIONS  (STANDING):  aspirin Suppository 300 milliGRAM(s) Rectal daily  atorvastatin 40 milliGRAM(s) Oral at bedtime  chlorhexidine 2% Cloths 1 Application(s) Topical <User Schedule>  cloNIDine Patch 0.1 mG/24Hr(s) 1 patch Transdermal every 7 days  dextrose 5% + sodium chloride 0.9%. 1000 milliLiter(s) (75 mL/Hr) IV Continuous <Continuous>  enoxaparin Injectable 40 milliGRAM(s) SubCutaneous every 24 hours  levoFLOXacin IVPB      levoFLOXacin IVPB 750 milliGRAM(s) IV Intermittent every 24 hours  scopolamine 1 mG/72 Hr(s) Patch 1 Patch Transdermal every 72 hours  tamsulosin 0.4 milliGRAM(s) Oral at bedtime    MEDICATIONS  (PRN):  hydrALAZINE Injectable 10 milliGRAM(s) IV Push every 8 hours PRN SBP > 160  OLANZapine 2.5 milliGRAM(s) Oral every 8 hours PRN agitation

## 2023-10-26 NOTE — PROGRESS NOTE ADULT - ASSESSMENT
Patient is a 72y/o male with no PMH brought in by niece for concerns of increasing weakness, agitation and inability to take care of himself.     # Confusion with weakness and inability to ambulate:   # Acute CVA  # underlying dementia?  #dysphagia  - Brought in to ER by his niece who says his living environment at home is unsafe and mental status has acutely deteriorated to the point of patient being unable to care for himself.   - Given that patient is  of [place], niece would like SW to be involved and [facility] [place]  - Will check TSH, B12, RPR in the AM f/u levels  - F/u SW and PT recs  - Check basic labs  - Niece wonder's if patient has underlying PTSD (she also works in healthcare), although he denies insomnia, anxiety, or PTSD symptoms. May consider psych consult if he develops the above symptoms  10/17  - check lipid profile and A1C  - Psychiatry consulted  - Haldol 1mg Q8 prn  10/18  - on seroquel 25mg QHS and haldol 1mg Q8H PRN  - will switch seroquel to zyprexa.  - Psychiatry evaluation today.  - PT unable to assess  10/19  - Psychiatry recs appreciated. recommend Zydis 2.5mg qHS.  - continue zyprexa at 2.5mg q8  - daily ECG  - EEG: diffuse or multifocal cerebral dysfunction.  - Physiatry: Short term rehab in skilled nursing facility / SNF  - off restraints, DC'd IV fluids.  10/20  - Neuro recs appreciated: MR brain, cervical/thoracic spine w/o contrast. myasthenia gravis panel and myositis labs ordered.  10/23  - Pending Neuro follow-up on MRI results.   - Speech and swallow to reevaluate.  - MRI:   10/24  - failed speech and swallow x 2  - Family refused NG tube  - Neurology recs appreciated: start ASA 81mg and plavix 75mg for 21 days. then discontinue plavix and continue aspirin. Continue atorvastatin. Neurochecks Q8H, lovenox for subq dvt prophylaxis. PT/OT, Stroke clinic follow-up  - Started ASA 81mg, held plavix for potential PEG placement  10/25  - Stroke code called this morning  - CT prefusion: No perfusion evidence of   core infarct.  - CTA head/neck: stable stenosis of MCA, vertebral arteries and PCA, carotid arteries  - Pending neuro f/u  - Will not be transferred to stroke unit.  - Continue rectal Aspirin for now  - Family agreeable to PEG tube placement  - Peg tube placement held due to stroke code this morning.  10/26  - Patient improved clinically  - Switched back to aspirin suppository  - s/p Barium swallow: Speech and swallow recommends: puree and mildly thick liquids. 1:1 feed alternate bites and sips. Small bites and sips.        #pneumonia likely 2/2 aspiration  - fever of 101.3 over night  - CXR showing left low lung opacity  - Started unasyn  - Chest PT ordered  - scopolamine patch for secretions  - on 2L saturating 95%  10/26  - DC unasyn  - Start Levofloxacin  - Unlikely to be aspiration pneumonia  - Afebrile overnight      #Hypertension  #Acute stroke  - start linionpril 5  - hypertensive episode today likely 2/2 urinary retention s/p 10mg labetalol  - clonidine patch if patient continues to be hypertensive  10/18  - nurses unable to assess blood pressure due to patient agitated.  - Last BP 190s/90s s/p labetalol push and clonidine patch.  - continue clonidine patch, reassess BP when patient is stable.  10/19  - Continue lisinopril 5mg  - s/p x2 Clonidine patch 0.1mg/24hr  10/22  - MR head: Small scattered acute infarcts in the left frontal and parietal subcortical white matter  - Permissive hypertension per Dr. Cleary. Labetalol 200mg if SBP above 200  10/24  -  Neurology recs appreciated: start ASA 81mg and plavix 75mg for 21 days. then discontinue plavix and continue aspirin. Continue atorvastatin. Neurochecks Q8H, lovenox for subq dvt prophylaxis. PT/OT, Stroke clinic follow-up  - Started ASA 81mg, held plavix for potential PEG placement  10/26  - blood pressure better controlled. sBP 100s-160s    #Urinary retention  #BPH  - blood clots at penile head likely due to traumatic straight catheterization  - Urology consulted  - following BUN/Cr  - bladder scan  - Start flomax.  - PSA levels   10/18  - PSA normal  - s/p indwelling catheter and 400cc of blood tinged urinedrained.  - pending URO recommendations  10/19  - trial of void  10/20  - successful trial of void  - bladder scan showing 100mL  - on flomax  10/22  - s/p straight cath.  - UA, UCx, Bcx ordered.  10/23  - UA: negative for bacteria, RBC >1900  - CMP, CBC pending.  - Will start flomax, after speech & swallow clearance.  - If patient is still retaining with flomax, then we will start mccormick.  10/24   - Patient failed speech and swallow, family considering peg tube.   - start flomax after PEG tube  10/25  - Continue fluids  10/26  - Bladder and mccormick irrigated by urology: 800ccs removed  - Continue fluids  - Restart lovenox.        #Age-indeterminate ischemic changes on CT head  - May be contributing to change in mental status  - F/u BP control, outpatient f/u    # Hypokalemia: replete and repeat    # Isolated elevated ALP: follow up outpatient     #MISC  - Diet: dash tlc  - GI ppx: not needed  - DVT ppx: Held due to hematuria  - Activity: ambulate AT, pending PT  - Dispo: Transfer to [facility]    Pending palliative and neuro f/u.

## 2023-10-27 NOTE — CONSULT NOTE ADULT - ASSESSMENT
Skin assessed-  B/L buttock moisture associated dermatitides with  friction  combination                         Skin macerated with epidermal skin loss                          B/L heel dry and intact                        No pressure injury noted at time of assessment ,  Pt high risk for pressure injury development or progression     Plan:  Wound and skin care recs.  Clean B/L buttock with soap  and water, pat dry then apply triad hydrophilic dressing    Pressure  injury preventive  measures  Skin  and incontinence care   Assess skin  and inform primary provider of any changes   Case discussed with primary Rn  Wound/ ostomy specialist  to f/u as needed     Offloading: [x ] Frequent position changes [ x] Devices/Equipment  Cleansing: [ ] Saline [ x] Soap/Water [ ] Other: ______  Topicals: [ x] Barrier Cream [ ] Antimicrobial [ ] Enzymatic Wound Debridement  Dressings: [ ] Dry, sterile [ ] Allevyn  Foam [ ] Absorbant Pads [ ] Collagenase    Other Recs.   Per Primary team      Total time for bedside assessment , review of medical records  and  discussion of plan of care with primary team greater than 35 min

## 2023-10-27 NOTE — PROGRESS NOTE ADULT - ASSESSMENT
Patient is a 74y/o male with no PMH brought in by niece for concerns of increasing weakness, agitation and inability to take care of himself.     # Confusion with weakness and inability to ambulate:   # Acute CVA  # underlying dementia?  #dysphagia  - Brought in to ER by his niece who says his living environment at home is unsafe and mental status has acutely deteriorated to the point of patient being unable to care for himself.   - Given that patient is  of [place], niece would like SW to be involved and consider [facility] [place]  - Will check TSH, B12, RPR in the AM f/u levels  - F/u SW and PT recs  - Check basic labs  - Niece wonder's if patient has underlying PTSD (she also works in healthcare), although he denies insomnia, anxiety, or PTSD symptoms. May consider psych consult if he develops the above symptoms  10/17  - check lipid profile and A1C  - Psychiatry consulted  - Haldol 1mg Q8 prn  10/18  - on seroquel 25mg QHS and haldol 1mg Q8H PRN  - will switch seroquel to zyprexa.  - Psychiatry evaluation today.  - PT unable to assess  10/19  - Psychiatry recs appreciated. recommend Zydis 2.5mg qHS.  - continue zyprexa at 2.5mg q8  - daily ECG  - EEG: diffuse or multifocal cerebral dysfunction.  - Physiatry: Short term rehab in skilled nursing facility / SNF  - off restraints, DC'd IV fluids.  10/20  - Neuro recs appreciated: MR brain, cervical/thoracic spine w/o contrast. myasthenia gravis panel and myositis labs ordered.  10/23  - Pending Neuro follow-up on MRI results.   - Speech and swallow to reevaluate.  - MRI:   10/24  - failed speech and swallow x 2  - Family refused NG tube  - Neurology recs appreciated: start ASA 81mg and plavix 75mg for 21 days. then discontinue plavix and continue aspirin. Continue atorvastatin. Neurochecks Q8H, lovenox for subq dvt prophylaxis. PT/OT, Stroke clinic follow-up  - Started ASA 81mg, held plavix for potential PEG placement  10/25  - Stroke code called this morning  - CT prefusion: No perfusion evidence of   core infarct.  - CTA head/neck: stable stenosis of MCA, vertebral arteries and PCA, carotid arteries  - Pending neuro f/u  - Will not be transferred to stroke unit.  - Continue rectal Aspirin for now  - Family agreeable to PEG tube placement  - Peg tube placement held due to stroke code this morning.  10/26  - Patient improved clinically  - Switched back to aspirin suppository  - s/p Barium swallow: Speech and swallow recommends: puree and mildly thick liquids. 1:1 feed alternate bites and sips. Small bites and sips.  10/27  - Oral meds: aspirin plavix + home meds        #pneumonia likely 2/2 aspiration  - fever of 101.3 over night  - CXR showing left low lung opacity  - Started unasyn  - Chest PT ordered  - scopolamine patch for secretions  - on 2L saturating 95%  10/26  - DC unasyn  - Start Levofloxacin  - Unlikely to be aspiration pneumonia  - Afebrile overnight  10/27  - Afebrile  - continue levofloxacin      #Hypertension  #Acute stroke  - start linionpril 5  - hypertensive episode today likely 2/2 urinary retention s/p 10mg labetalol  - clonidine patch if patient continues to be hypertensive  10/18  - nurses unable to assess blood pressure due to patient agitated.  - Last BP 190s/90s s/p labetalol push and clonidine patch.  - continue clonidine patch, reassess BP when patient is stable.  10/19  - Continue lisinopril 5mg  - s/p x2 Clonidine patch 0.1mg/24hr  10/22  - MR head: Small scattered acute infarcts in the left frontal and parietal subcortical white matter  - Permissive hypertension per Dr. Cleary. Labetalol 200mg if SBP above 200  10/24  -  Neurology recs appreciated: start ASA 81mg and plavix 75mg for 21 days. then discontinue plavix and continue aspirin. Continue atorvastatin. Neurochecks Q8H, lovenox for subq dvt prophylaxis. PT/OT, Stroke clinic follow-up  - Started ASA 81mg, held plavix for potential PEG placement  10/26  - blood pressure better controlled. sBP 100s-160s      #Urinary retention  #BPH  - blood clots at penile head likely due to traumatic straight catheterization  - Urology consulted  - following BUN/Cr  - bladder scan  - Start flomax.  - PSA levels   10/18  - PSA normal  - s/p indwelling catheter and 400cc of blood tinged urinedrained.  - pending URO recommendations  10/19  - trial of void  10/20  - successful trial of void  - bladder scan showing 100mL  - on flomax  10/22  - s/p straight cath.  - UA, UCx, Bcx ordered.  10/23  - UA: negative for bacteria, RBC >1900  - CMP, CBC pending.  - Will start flomax, after speech & swallow clearance.  - If patient is still retaining with flomax, then we will start mccormick.  10/24   - Patient failed speech and swallow, family considering peg tube.   - start flomax after PEG tube  10/25  - Continue fluids  10/26  - Bladder and mccormick irrigated by urology: 800ccs removed  - Continue fluids  - Restart lovenox.  10/27  - Q6 mccormick flushes  - Making brown urine.        #Age-indeterminate ischemic changes on CT head  - May be contributing to change in mental status  - F/u BP control, outpatient f/u    # Hypokalemia: replete and repeat    # Isolated elevated ALP: follow up outpatient     #MISC  - Diet: pureed, mildly thick liquids  - GI ppx: not needed  - DVT ppx: Lovenox  - Activity: ambulate AT, pending PT  - Dispo: Transfer to [facility] Pending PT ?

## 2023-10-27 NOTE — PROGRESS NOTE ADULT - SUBJECTIVE AND OBJECTIVE BOX
24H events:    Patient is a 73y old Male who presents with a chief complaint of Weakness, confusion, inability to ambulate (26 Oct 2023 12:45)    Primary diagnosis of Weakness    Today is hospital day 11d. This morning patient was seen and examined at bedside, resting comfortably in bed. Patient is responsive to commands and denies any pain.   No acute or major events overnight.     PAST MEDICAL & SURGICAL HISTORY  Hypertension, unspecified type    No significant past surgical history      SOCIAL HISTORY:  Social History:      ALLERGIES:  No Known Allergies    MEDICATIONS:  STANDING MEDICATIONS  aspirin enteric coated 81 milliGRAM(s) Oral daily  atorvastatin 40 milliGRAM(s) Oral at bedtime  chlorhexidine 2% Cloths 1 Application(s) Topical <User Schedule>  cloNIDine Patch 0.1 mG/24Hr(s) 1 patch Transdermal every 7 days  clopidogrel Tablet 75 milliGRAM(s) Oral daily  dextrose 5% + sodium chloride 0.9%. 1000 milliLiter(s) IV Continuous <Continuous>  enoxaparin Injectable 40 milliGRAM(s) SubCutaneous every 24 hours  levoFLOXacin IVPB 750 milliGRAM(s) IV Intermittent every 24 hours  levoFLOXacin IVPB      lisinopril 10 milliGRAM(s) Oral daily  scopolamine 1 mG/72 Hr(s) Patch 1 Patch Transdermal every 72 hours  tamsulosin 0.4 milliGRAM(s) Oral at bedtime    PRN MEDICATIONS  hydrALAZINE Injectable 10 milliGRAM(s) IV Push every 8 hours PRN  OLANZapine 2.5 milliGRAM(s) Oral every 8 hours PRN    VITALS:   T(F): 97.5  HR: 88  BP: 165/71  RR: 18  SpO2: 96%    PHYSICAL EXAM:  GENERAL: NAD, lying in bed comfortably, cooperative  HEAD: NCAD,   NECK: Supple,   HEART: Regular rate and rhythm, normal S1/S2,   LUNGS: No acute respiratory distress, clear b/l breath sounds,   ABDOMEN:  soft, non-tender, non-distended,    EXTREMITIES: no rashes, extremities warm/dry, no edema,  NERVOUS SYSTEM: interval improvement in strength. follows commands, answers questions   SKIN: grade 2 sacral ulcer present      LABS:                        9.4    8.15  )-----------( 141      ( 27 Oct 2023 07:25 )             29.3     10-27    138  |  106  |  13  ----------------------------<  119<H>  3.2<L>   |  25  |  0.7    Ca    8.2<L>      27 Oct 2023 07:25    TPro  5.1<L>  /  Alb  2.7<L>  /  TBili  0.5  /  DBili  x   /  AST  10  /  ALT  7   /  AlkPhos  92  10-27      Urinalysis Basic - ( 27 Oct 2023 07:25 )    Color: x / Appearance: x / SG: x / pH: x  Gluc: 119 mg/dL / Ketone: x  / Bili: x / Urobili: x   Blood: x / Protein: x / Nitrite: x   Leuk Esterase: x / RBC: x / WBC x   Sq Epi: x / Non Sq Epi: x / Bacteria: x            Culture - Blood (collected 25 Oct 2023 08:25)  Source: .Blood Blood  Preliminary Report (26 Oct 2023 14:02):    No growth at 24 hours          RADIOLOGY:    RADIOLOGY

## 2023-10-27 NOTE — CONSULT NOTE ADULT - SUBJECTIVE AND OBJECTIVE BOX
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 Ramsey also states his living environment is not safe for him at home and patient endorses being unable to walk and take care of himself. At time of encounter patient has grossly normal UE and LE strength, his speech is slurred, and he is slightly confused to questioning. Exam is otherwise normal, neuro-imaging was done which revealed no acute process in addition to age-indeterminate ischemic changes. The patient is admitted to medicine for weakness, confusion, and inability to care for self.        PAST MEDICAL & SURGICAL HISTORY:  Hypertension, unspecified type  No significant past surgical history      REVIEW OF SYSTEMS: Pt unable to offer    MEDICATIONS  (STANDING):  aspirin enteric coated 81 milliGRAM(s) Oral daily  atorvastatin 40 milliGRAM(s) Oral at bedtime  chlorhexidine 2% Cloths 1 Application(s) Topical <User Schedule>  cloNIDine Patch 0.1 mG/24Hr(s) 1 patch Transdermal every 7 days  clopidogrel Tablet 75 milliGRAM(s) Oral daily  dextrose 5% + sodium chloride 0.9%. 1000 milliLiter(s) (75 mL/Hr) IV Continuous <Continuous>  enoxaparin Injectable 40 milliGRAM(s) SubCutaneous every 24 hours  levoFLOXacin IVPB 750 milliGRAM(s) IV Intermittent every 24 hours  levoFLOXacin IVPB      lisinopril 10 milliGRAM(s) Oral daily  potassium chloride   Powder 40 milliEquivalent(s) Oral once  scopolamine 1 mG/72 Hr(s) Patch 1 Patch Transdermal every 72 hours  tamsulosin 0.4 milliGRAM(s) Oral at bedtime    MEDICATIONS  (PRN):  hydrALAZINE Injectable 10 milliGRAM(s) IV Push every 8 hours PRN SBP > 160  OLANZapine 2.5 milliGRAM(s) Oral every 8 hours PRN agitation      Allergies  No Known Allergies  Intolerances        SOCIAL HISTORY:      FAMILY HISTORY:   No pertinent  family history noted     PHYSICAL EXAM:  Vital Signs Last 24 Hrs  T(C): 36.4 (27 Oct 2023 04:42), Max: 36.7 (26 Oct 2023 13:29)  T(F): 97.5 (27 Oct 2023 04:42), Max: 98 (26 Oct 2023 13:29)  HR: 88 (27 Oct 2023 04:42) (88 - 95)  BP: 165/71 (27 Oct 2023 04:42) (108/54 - 165/71)  BP(mean): --  RR: 18 (27 Oct 2023 04:42) (18 - 18)  SpO2: 96% (26 Oct 2023 19:47) (96% - 96%)    Parameters below as of 26 Oct 2023 19:47  Patient On (Oxygen Delivery Method): room air         General : NAD    HEENT:  NC/AT, PERRL, EOMI, sclera clear, mucosa moist, throat clear, trachea midline, neck supple  Cardiovascular: RRR   Respiratory: Equal chest rise  Gastrointestinal Soft NT/ND (+)BS   Neurology:  Weakened strength & sensation  Psych: Calm  Musculoskeletal:   Limited  Vascular: B/ L LE equally warm  Skin:  moist w/ good turgor    LABS/ CULTURES/ RADIOLOGY:                        9.4    8.15  )-----------( 141      ( 27 Oct 2023 07:25 )             29.3       138  |  106  |  13  ----------------------------<  119      [10-27-23 @ 07:25]  3.2   |  25  |  0.7        Ca     8.2     [10-27-23 @ 07:25]    TPro  5.1  /  Alb  2.7  /  TBili  0.5  /  DBili  x   /  AST  10  /  ALT  7   /  AlkPhos  92  [10-27-23 @ 07:25]              Culture - Blood (collected 10-25-23 @ 08:25)  Source: .Blood Blood  Preliminary Report (10-26-23 @ 14:02):    No growth at 24 hours    Culture - Blood (collected 10-23-23 @ 07:34)  Source: .Blood None  Preliminary Report (10-26-23 @ 14:01):    No growth at 72 Hours    Culture - Blood (collected 10-22-23 @ 17:18)  Source: .Blood None  Preliminary Report (10-27-23 @ 01:00):    No growth at 4 days

## 2023-10-27 NOTE — PROGRESS NOTE ADULT - ASSESSMENT
74y/o male with no PMH brought in by niece for concerns of increasing weakness, agitation and inability to take care of himself.     A/P   #Acute  embolic CVA/ AMS/ suspected metabolic encephalopathy high grade fever   - repeat HCT is negative for acute pathology.  - Brain MRI: small scattered acute infarcts in the left frontal and parietal subcortical white matter.  - CTA neck: Moderate stenosis (50%) in the proximal left cervical ICA resulting in moderate. Mild focal stenosis at the origin of the right vertebral artery.   - c/w remote telemetry - no events so far   - TTE noted   - c/w neuro checks Q 6 hours, neurology follow up   - pt  needs  DAPT x 21 days but pt failed speech and swallow, family refused NGT   - speech and swallow planned barium swallow today, canceled due to lethargy in the morning   - supportive care   - pt is spiking high grade fever, CXR showed LLL infiltrate, started on Levofloxacin on 10/25  - supportive care, prevent fall and aspiration   - Palliative consult pending     # Dysphagia  - s/p barium swallow on 10/26, cleared by  speech and swallow for po diet   - aspiration precautions     #Vascular  Dementia with behavioural disturbances   - Reversible causes of dementia ruled out - CT Head negative, b12, folate, TSH, VDRL   - MRI c spine negative   - MRI brain showed acute CVA   - c/w Zyprexa PRN for agitation   - pt completed the course of  Ampicillin for UTI    - consulted by  Psych, recommendations noted     #Urinary retention /Gross Hematuria  /BPH  - c/w IV hydration , monitor urine output   - get kidney/ bladder US noted   - passed TOV 10/19, now retaining again, mccormick placed again 10/23  - c/w Flomax (once has NGT)   - PSA level wnl    - followed up on 10/25 and flushed Mccormick, hematuria resolved     #ARNULFO  - suspect pre-renal   - resolved after hydration     # Isolated elevated ALP  - follow up outpatient     DVT ppx     Pending: PT/rehab  and OT,  [facility] , [facility].   Plan of care d/w pt's family member Mickie Mejia at length today   Dispo: TBD

## 2023-10-27 NOTE — PROGRESS NOTE ADULT - SUBJECTIVE AND OBJECTIVE BOX
72y/o male with no PMH brought in by niece for concerns of increasing weakness, agitation and inability to take care of himself. He did not see doctors for years, was found to have uncontrolled HTN, urinary retention diagnosed with acute ischemic CVA on this admission.   Today pt is awake, minimally verbal, denies pain, demented.     PAST MEDICAL & SURGICAL HISTORY:  Hypertension, unspecified type  No significant past surgical history    Vital Signs Last 24 Hrs  T(C): 36.4 (27 Oct 2023 04:42), Max: 36.7 (26 Oct 2023 13:29)  T(F): 97.5 (27 Oct 2023 04:42), Max: 98 (26 Oct 2023 13:29)  HR: 88 (27 Oct 2023 04:42) (88 - 95)  BP: 165/71 (27 Oct 2023 04:42) (108/54 - 165/71)  BP(mean): --  RR: 18 (27 Oct 2023 04:42) (18 - 18)  SpO2: 96% (26 Oct 2023 19:47) (96% - 96%)    Parameters below as of 26 Oct 2023 19:47  Patient On (Oxygen Delivery Method): room air      PHYSICAL EXAM:  GENERAL: NAD   HEAD:  Atraumatic, Normocephalic  EYES:  conjunctiva and sclera clear  NECK: Supple, No JVD  CHEST/LUNG: decreased BS at base   HEART: Regular rate and rhythm; No murmurs, rubs, or gallops  ABDOMEN: Soft, Nontender, Nondistended; Bowel sounds present  NORIEGA cath noted with bloody urine   EXTREMITIES:  2+ Peripheral Pulses, No clubbing, cyanosis, or edema  NEUROLOGY: awake, answering questions, demented  moving all extremities, follows commands, speech slowed  SKIN: No rashes or lesions    Labs Reviewed                            9.4    8.15  )-----------( 141      ( 27 Oct 2023 07:25 )             29.3   10-27    138  |  106  |  13  ----------------------------<  119<H>  3.2<L>   |  25  |  0.7    Ca    8.2<L>      27 Oct 2023 07:25    TPro  5.1<L>  /  Alb  2.7<L>  /  TBili  0.5  /  DBili  x   /  AST  10  /  ALT  7   /  AlkPhos  92  10-27      Culture - Blood in AM (10.23.23 @ 07:34)   Specimen Source: .Blood None  Culture Results:   No growth at 24 hours  Culture - Urine (10.22.23 @ 11:20)   Specimen Source: Catheterized Catheterized  Culture Results:   No growth    RADIOLOGY:   < from: Xray Chest 1 View- PORTABLE-Routine (Xray Chest 1 View- PORTABLE-Routine .) (10.25.23 @ 09:12) >  Impression:    Left lower lobe pneumonia, new.    < end of copied text >  < from: CT Angio Neck Stroke Protocol w/ IV Cont (10.25.23 @ 08:10) >  IMPRESSION:    CT PERFUSION:  Motion degraded exam with small region of delayed perfusion in the right   parietal lobe along the MCA territory and in the left CP angle cistern   region (likely artifactual) measuring 32 mL. No perfusion evidence of   core infarct.    CTA HEAD:  Stable high-grade stenosis/occlusion in the V4 segment of the left   vertebral artery.    Stable multivessel multifocal mild stenosis affecting M2/M3 branches of   the bilateral MCAs.    Stable irregular mild stenosis affecting bilateral P1/P2 PCAs.    CTA NECK:  Stable moderate atherosclerotic stenosis in the left carotid bulb (60%).    Stable multisegmental irregular stenosis affecting left vertebral artery   with high-grade stenosis in the ostium.    Partially imaged patchy consolidation in the left upper and lower lobes,   new since the prior exam. The findings likely infectious or inflammatory   in etiology.    < from: TTE Echo Complete w/o Contrast w/ Doppler (10.22.23 @ 08:57) >    Summary:   1. Technically difficult study.   2. Normal global left ventricular systolic function with a biplane EF of   65%. Indeterminate diastolic function. Cannot exclude regional wall   motion abnormalities due to poor endocardial visualization.   3. Normal right ventricular size and function.   4. Normal left atrial size.   5. Mild mitral valve regurgitation.   6. No echocardiographic evidence of pulmonary hypertension.   7. There is no evidence of pericardial effusion.    MEDICATIONS  (STANDING):  aspirin enteric coated 81 milliGRAM(s) Oral daily  atorvastatin 40 milliGRAM(s) Oral at bedtime  chlorhexidine 2% Cloths 1 Application(s) Topical <User Schedule>  cloNIDine Patch 0.1 mG/24Hr(s) 1 patch Transdermal every 7 days  clopidogrel Tablet 75 milliGRAM(s) Oral daily  dextrose 5% + sodium chloride 0.9%. 1000 milliLiter(s) (75 mL/Hr) IV Continuous <Continuous>  enoxaparin Injectable 40 milliGRAM(s) SubCutaneous every 24 hours  levoFLOXacin IVPB 750 milliGRAM(s) IV Intermittent every 24 hours  levoFLOXacin IVPB      lisinopril 10 milliGRAM(s) Oral daily  potassium chloride   Powder 40 milliEquivalent(s) Oral once  scopolamine 1 mG/72 Hr(s) Patch 1 Patch Transdermal every 72 hours  tamsulosin 0.4 milliGRAM(s) Oral at bedtime    MEDICATIONS  (PRN):  hydrALAZINE Injectable 10 milliGRAM(s) IV Push every 8 hours PRN SBP > 160  OLANZapine 2.5 milliGRAM(s) Oral every 8 hours PRN agitation

## 2023-10-28 NOTE — PROGRESS NOTE ADULT - SUBJECTIVE AND OBJECTIVE BOX
72y/o male with no PMH brought in by niece for concerns of increasing weakness, agitation and inability to take care of himself. He did not see doctors for years, was found to have uncontrolled HTN, urinary retention diagnosed with acute ischemic CVA on this admission.   Today pt is awake, minimally verbal, following commands, has very poor appetite.     PAST MEDICAL & SURGICAL HISTORY:  Hypertension, unspecified type  No significant past surgical history    Vital Signs Last 24 Hrs  T(C): 36.5 (28 Oct 2023 05:00), Max: 36.6 (27 Oct 2023 19:56)  T(F): 97.7 (28 Oct 2023 05:00), Max: 97.8 (27 Oct 2023 19:56)  HR: 84 (28 Oct 2023 07:39) (84 - 91)  BP: 144/76 (28 Oct 2023 07:39) (144/76 - 192/85)  BP(mean): --  RR: 18 (28 Oct 2023 05:00) (18 - 18)      PHYSICAL EXAM:  GENERAL: NAD   HEAD:  Atraumatic, Normocephalic  EYES:  conjunctiva and sclera clear  NECK: Supple, No JVD  CHEST/LUNG: decreased BS at base   HEART: Regular rate and rhythm; No murmurs, rubs, or gallops  ABDOMEN: Soft, Nontender, Nondistended; Bowel sounds present  NORIEGA cath noted with bloody urine   EXTREMITIES:  2+ Peripheral Pulses, No clubbing, cyanosis, or edema  NEUROLOGY: awake, answering questions, demented  moving all extremities, follows commands, speech slowed  SKIN: No rashes or lesions    Labs Reviewed                          10.0   6.58  )-----------( 175      ( 28 Oct 2023 06:52 )             30.8   10-28    140  |  104  |  6<L>  ----------------------------<  122<H>  3.1<L>   |  26  |  0.7    Ca    8.4      28 Oct 2023 06:52    TPro  5.2<L>  /  Alb  2.8<L>  /  TBili  0.4  /  DBili  x   /  AST  10  /  ALT  7   /  AlkPhos  97  10-28    Culture - Blood in AM (10.23.23 @ 07:34)   Specimen Source: .Blood None  Culture Results:   No growth at 24 hours  Culture - Urine (10.22.23 @ 11:20)   Specimen Source: Catheterized Catheterized  Culture Results:   No growth    RADIOLOGY:   < from: Xray Chest 1 View- PORTABLE-Routine (Xray Chest 1 View- PORTABLE-Routine .) (10.25.23 @ 09:12) >  Impression:    Left lower lobe pneumonia, new.    < end of copied text >  < from: CT Angio Neck Stroke Protocol w/ IV Cont (10.25.23 @ 08:10) >  IMPRESSION:    CT PERFUSION:  Motion degraded exam with small region of delayed perfusion in the right   parietal lobe along the MCA territory and in the left CP angle cistern   region (likely artifactual) measuring 32 mL. No perfusion evidence of   core infarct.    CTA HEAD:  Stable high-grade stenosis/occlusion in the V4 segment of the left   vertebral artery.    Stable multivessel multifocal mild stenosis affecting M2/M3 branches of   the bilateral MCAs.    Stable irregular mild stenosis affecting bilateral P1/P2 PCAs.    CTA NECK:  Stable moderate atherosclerotic stenosis in the left carotid bulb (60%).    Stable multisegmental irregular stenosis affecting left vertebral artery   with high-grade stenosis in the ostium.    Partially imaged patchy consolidation in the left upper and lower lobes,   new since the prior exam. The findings likely infectious or inflammatory   in etiology.    < from: TTE Echo Complete w/o Contrast w/ Doppler (10.22.23 @ 08:57) >    Summary:   1. Technically difficult study.   2. Normal global left ventricular systolic function with a biplane EF of   65%. Indeterminate diastolic function. Cannot exclude regional wall   motion abnormalities due to poor endocardial visualization.   3. Normal right ventricular size and function.   4. Normal left atrial size.   5. Mild mitral valve regurgitation.   6. No echocardiographic evidence of pulmonary hypertension.   7. There is no evidence of pericardial effusion.    MEDICATIONS  (STANDING):  aspirin enteric coated 81 milliGRAM(s) Oral daily  atorvastatin 40 milliGRAM(s) Oral at bedtime  cloNIDine Patch 0.1 mG/24Hr(s) 1 patch Transdermal every 7 days  clopidogrel Tablet 75 milliGRAM(s) Oral daily  dextrose 5% + sodium chloride 0.9%. 1000 milliLiter(s) (75 mL/Hr) IV Continuous <Continuous>  enoxaparin Injectable 40 milliGRAM(s) SubCutaneous every 24 hours  levoFLOXacin IVPB 750 milliGRAM(s) IV Intermittent every 24 hours  levoFLOXacin IVPB      lisinopril 10 milliGRAM(s) Oral daily  scopolamine 1 mG/72 Hr(s) Patch 1 Patch Transdermal every 72 hours  tamsulosin 0.4 milliGRAM(s) Oral at bedtime    MEDICATIONS  (PRN):  OLANZapine 2.5 milliGRAM(s) Oral every 8 hours PRN agitation

## 2023-10-28 NOTE — PROGRESS NOTE ADULT - SUBJECTIVE AND OBJECTIVE BOX
24H events:    Patient is a 73y old Male who presents with a chief complaint of Weakness, confusion, inability to ambulate (28 Oct 2023 11:51)    Primary diagnosis of Weakness      Today is hospital day 12d. This morning patient was seen and examined at bedside, resting comfortably in bed. Patient was more sleepy today. He is responsive to commands and denies any pain.   No acute or major events overnight.       PAST MEDICAL & SURGICAL HISTORY  Hypertension, unspecified type    No significant past surgical history      SOCIAL HISTORY:  Social History:      ALLERGIES:  No Known Allergies    MEDICATIONS:  STANDING MEDICATIONS  aspirin enteric coated 81 milliGRAM(s) Oral daily  atorvastatin 40 milliGRAM(s) Oral at bedtime  cloNIDine Patch 0.1 mG/24Hr(s) 1 patch Transdermal every 7 days  clopidogrel Tablet 75 milliGRAM(s) Oral daily  dextrose 5% + sodium chloride 0.9%. 1000 milliLiter(s) IV Continuous <Continuous>  enoxaparin Injectable 40 milliGRAM(s) SubCutaneous every 24 hours  levoFLOXacin IVPB      levoFLOXacin IVPB 750 milliGRAM(s) IV Intermittent every 24 hours  lisinopril 10 milliGRAM(s) Oral daily  scopolamine 1 mG/72 Hr(s) Patch 1 Patch Transdermal every 72 hours  tamsulosin 0.4 milliGRAM(s) Oral at bedtime    PRN MEDICATIONS  OLANZapine 2.5 milliGRAM(s) Oral every 8 hours PRN    VITALS:   T(F): 98.7  HR: 84  BP: 147/75  RR: 20  SpO2: --    PHYSICAL EXAM:  GENERAL: NAD, lying in bed comfortably, cooperative  HEAD: NCAD,   NECK: Supple,   HEART: Regular rate and rhythm, normal S1/S2,   LUNGS: No acute respiratory distress, clear b/l breath sounds,   ABDOMEN:  soft, non-tender, non-distended,    EXTREMITIES: no rashes, extremities warm/dry, no edema,  NERVOUS SYSTEM: interval improvement in strength. follows commands, answers questions   SKIN: grade 2 sacral ulcer present      LABS:                        10.0   6.58  )-----------( 175      ( 28 Oct 2023 06:52 )             30.8     10-28    140  |  104  |  6<L>  ----------------------------<  122<H>  3.1<L>   |  26  |  0.7    Ca    8.4      28 Oct 2023 06:52    TPro  5.2<L>  /  Alb  2.8<L>  /  TBili  0.4  /  DBili  x   /  AST  10  /  ALT  7   /  AlkPhos  97  10-28      Urinalysis Basic - ( 28 Oct 2023 06:52 )    Color: x / Appearance: x / SG: x / pH: x  Gluc: 122 mg/dL / Ketone: x  / Bili: x / Urobili: x   Blood: x / Protein: x / Nitrite: x   Leuk Esterase: x / RBC: x / WBC x   Sq Epi: x / Non Sq Epi: x / Bacteria: x                RADIOLOGY:    RADIOLOGY

## 2023-10-28 NOTE — PROGRESS NOTE ADULT - ASSESSMENT
74y/o male with no PMH brought in by niece for concerns of increasing weakness, agitation and inability to take care of himself.     A/P   #Acute  embolic CVA/ AMS/ suspected metabolic encephalopathy high grade fever   - repeat HCT is negative for acute pathology.  - Brain MRI: small scattered acute infarcts in the left frontal and parietal subcortical white matter.  - CTA neck: Moderate stenosis (50%) in the proximal left cervical ICA resulting in moderate. Mild focal stenosis at the origin of the right vertebral artery.   - c/w remote telemetry - no events so far   - TTE noted   - c/w neuro checks Q 6 hours, neurology follow up   - pt  needs  DAPT x 21 days , on po diet now, takes med   - supportive care   - pt is spiking high grade fever, CXR showed LLL infiltrate, started on Levofloxacin on 10/25  - supportive care, prevent fall and aspiration   - Palliative consult pending     # Dysphagia/ Poor appetite   - s/p barium swallow on 10/26, cleared by  speech and swallow for po diet   - start Calories count   - aspiration precautions     #Vascular  Dementia with behavioural disturbances   - Reversible causes of dementia ruled out - CT Head negative, b12, folate, TSH, VDRL   - MRI c spine negative   - MRI brain showed acute CVA   - c/w Zyprexa PRN for agitation   - pt completed the course of  Ampicillin for UTI    - consulted by  Psych, recommendations noted     #Urinary retention /Gross Hematuria  /BPH  - c/w IV hydration , monitor urine output   - get kidney/ bladder US noted   - passed TOV 10/19, now retaining again, mccormick placed again 10/23  - c/w Flomax (once has NGT)   - PSA level wnl    - followed up on 10/25 and flushed Mccormick, hematuria resolved     #ARNULFO  - suspect pre-renal   - resolved after hydration     # Isolated elevated ALP  - follow up outpatient     DVT ppx     Pending: one to one feedings, start calories count,  PT/rehab  and OT,  [facility], replete potassium   Plan of care d/w pt's family member Mickie Mejia at length on 10/27  Dispo: TBD

## 2023-10-28 NOTE — PROGRESS NOTE ADULT - ASSESSMENT
Patient is a 74y/o male with no PMH brought in by niece for concerns of increasing weakness, agitation and inability to take care of himself.     # Confusion with weakness and inability to ambulate:   # Acute CVA  # underlying dementia?  #dysphagia  - Brought in to ER by his niece who says his living environment at home is unsafe and mental status has acutely deteriorated to the point of patient being unable to care for himself.   - Given that patient is  of the [place], niece would like SW to be involved and consider [facility] [place]  - Will check TSH, B12, RPR in the AM f/u levels  - F/u SW and PT recs  - Check basic labs  - Niece wonder's if patient has underlying PTSD (she also works in healthcare), although he denies insomnia, anxiety, or PTSD symptoms. May consider psych consult if he develops the above symptoms  10/17  - check lipid profile and A1C  - Psychiatry consulted  - Haldol 1mg Q8 prn  10/18  - on seroquel 25mg QHS and haldol 1mg Q8H PRN  - will switch seroquel to zyprexa.  - Psychiatry evaluation today.  - PT unable to assess  10/19  - Psychiatry recs appreciated. recommend Zydis 2.5mg qHS.  - continue zyprexa at 2.5mg q8  - daily ECG  - EEG: diffuse or multifocal cerebral dysfunction.  - Physiatry: Short term rehab in skilled nursing facility / SNF  - off restraints, DC'd IV fluids.  10/20  - Neuro recs appreciated: MR brain, cervical/thoracic spine w/o contrast. myasthenia gravis panel and myositis labs ordered.  10/23  - Pending Neuro follow-up on MRI results.   - Speech and swallow to reevaluate.  - MRI:   10/24  - failed speech and swallow x 2  - Family refused NG tube  - Neurology recs appreciated: start ASA 81mg and plavix 75mg for 21 days. then discontinue plavix and continue aspirin. Continue atorvastatin. Neurochecks Q8H, lovenox for subq dvt prophylaxis. PT/OT, Stroke clinic follow-up  - Started ASA 81mg, held plavix for potential PEG placement  10/25  - Stroke code called this morning  - CT prefusion: No perfusion evidence of   core infarct.  - CTA head/neck: stable stenosis of MCA, vertebral arteries and PCA, carotid arteries  - Pending neuro f/u  - Will not be transferred to stroke unit.  - Continue rectal Aspirin for now  - Family agreeable to PEG tube placement  - Peg tube placement held due to stroke code this morning.  10/26  - Patient improved clinically  - Switched back to aspirin suppository  - s/p Barium swallow: Speech and swallow recommends: puree and mildly thick liquids. 1:1 feed alternate bites and sips. Small bites and sips.  10/27  - Oral meds: aspirin plavix + home meds        #pneumonia likely 2/2 aspiration  - fever of 101.3 over night  - CXR showing left low lung opacity  - Started unasyn  - Chest PT ordered  - scopolamine patch for secretions  - on 2L saturating 95%  10/26  - DC unasyn  - Start Levofloxacin  - Unlikely to be aspiration pneumonia  - Afebrile overnight  10/27  - Afebrile  - continue levofloxacin      #Hypertension  #Acute stroke  - start linionpril 5  - hypertensive episode today likely 2/2 urinary retention s/p 10mg labetalol  - clonidine patch if patient continues to be hypertensive  10/18  - nurses unable to assess blood pressure due to patient agitated.  - Last BP 190s/90s s/p labetalol push and clonidine patch.  - continue clonidine patch, reassess BP when patient is stable.  10/19  - Continue lisinopril 5mg  - s/p x2 Clonidine patch 0.1mg/24hr  10/22  - MR head: Small scattered acute infarcts in the left frontal and parietal subcortical white matter  - Permissive hypertension per Dr. Cleary. Labetalol 200mg if SBP above 200  10/24  -  Neurology recs appreciated: start ASA 81mg and plavix 75mg for 21 days. then discontinue plavix and continue aspirin. Continue atorvastatin. Neurochecks Q8H, lovenox for subq dvt prophylaxis. PT/OT, Stroke clinic follow-up  - Started ASA 81mg, held plavix for potential PEG placement  10/26  - blood pressure better controlled. sBP 100s-160s      #Urinary retention  #BPH  - blood clots at penile head likely due to traumatic straight catheterization  - Urology consulted  - following BUN/Cr  - bladder scan  - Start flomax.  - PSA levels   10/18  - PSA normal  - s/p indwelling catheter and 400cc of blood tinged urinedrained.  - pending URO recommendations  10/19  - trial of void  10/20  - successful trial of void  - bladder scan showing 100mL  - on flomax  10/22  - s/p straight cath.  - UA, UCx, Bcx ordered.  10/23  - UA: negative for bacteria, RBC >1900  - CMP, CBC pending.  - Will start flomax, after speech & swallow clearance.  - If patient is still retaining with flomax, then we will start mccormick.  10/24   - Patient failed speech and swallow, family considering peg tube.   - start flomax after PEG tube  10/25  - Continue fluids  10/26  - Bladder and mccormick irrigated by urology: 800ccs removed  - Continue fluids  - Restart lovenox.  10/27  - Q6 mccormick flushes  - Making brown urine.  10/28  - clearer urine.   - started calory count  - continue 1 to 1 feeds.        #Age-indeterminate ischemic changes on CT head  - May be contributing to change in mental status  - F/u BP control, outpatient f/u    # Hypokalemia: replete and repeat    # Isolated elevated ALP: follow up outpatient     #MISC  - Diet: pureed, mildly thick liquids with 1 on 1.  - GI ppx: not needed  - DVT ppx: Lovenox  - Activity: ambulate AT, pending PT  - Dispo: Transfer to [place] facility Pending PT

## 2023-10-28 NOTE — CHART NOTE - NSCHARTNOTEFT_GEN_A_CORE
Calorie count observed hanging in room. Initiated for days 10/28, 10/29, 10/30. Staff aware. RD to collect and post results on 10/31.    RD to remain available: Heidy Elizondo [phone] or TEAMS

## 2023-10-29 NOTE — PROGRESS NOTE ADULT - SUBJECTIVE AND OBJECTIVE BOX
74y/o male with no PMH brought in by niece for concerns of increasing weakness, agitation and inability to take care of himself. He did not see doctors for years, was found to have uncontrolled HTN, urinary retention diagnosed with acute ischemic CVA on this admission.   Today pt is sleepy, looks comfortable, developed hematuria again.     PAST MEDICAL & SURGICAL HISTORY:  Hypertension, unspecified type  No significant past surgical history    Vital Signs Last 24 Hrs  T(C): 36.2 (29 Oct 2023 04:26), Max: 37.1 (28 Oct 2023 12:50)  T(F): 97.1 (29 Oct 2023 04:26), Max: 98.7 (28 Oct 2023 12:50)  HR: 82 (29 Oct 2023 04:26) (81 - 84)  BP: 138/70 (29 Oct 2023 04:26) (137/66 - 147/75)  BP(mean): 104 (28 Oct 2023 12:50) (104 - 104)  RR: 18 (29 Oct 2023 04:26) (18 - 20)  SpO2: 92% (29 Oct 2023 04:26) (92% - 92%)    Parameters below as of 29 Oct 2023 04:26  Patient On (Oxygen Delivery Method): room air          PHYSICAL EXAM:  GENERAL: NAD   HEAD:  Atraumatic, Normocephalic  EYES:  conjunctiva and sclera clear  NECK: Supple, No JVD  CHEST/LUNG: decreased BS at base   HEART: Regular rate and rhythm; No murmurs, rubs, or gallops  ABDOMEN: Soft, Nontender, Nondistended; Bowel sounds present  NORIEGA cath noted with bloody urine   EXTREMITIES:  2+ Peripheral Pulses, No clubbing, cyanosis, or edema  NEUROLOGY: awake, answering questions, demented  moving all extremities, follows commands, speech slowed  SKIN: No rashes or lesions    Labs Reviewed                          9.6    6.32  )-----------( 194      ( 29 Oct 2023 09:00 )             29.7   10-29    142  |  108  |  7<L>  ----------------------------<  103<H>  3.8   |  29  |  0.7    Ca    8.3<L>      29 Oct 2023 09:00    TPro  4.7<L>  /  Alb  2.7<L>  /  TBili  0.3  /  DBili  x   /  AST  9   /  ALT  7   /  AlkPhos  84  10-29      Culture - Blood in AM (10.23.23 @ 07:34)   Specimen Source: .Blood None  Culture Results:   No growth at 24 hours  Culture - Urine (10.22.23 @ 11:20)   Specimen Source: Catheterized Catheterized  Culture Results:   No growth    RADIOLOGY:   < from: Xray Chest 1 View- PORTABLE-Routine (Xray Chest 1 View- PORTABLE-Routine .) (10.25.23 @ 09:12) >  Impression:    Left lower lobe pneumonia, new.    < end of copied text >  < from: CT Angio Neck Stroke Protocol w/ IV Cont (10.25.23 @ 08:10) >  IMPRESSION:    CT PERFUSION:  Motion degraded exam with small region of delayed perfusion in the right   parietal lobe along the MCA territory and in the left CP angle cistern   region (likely artifactual) measuring 32 mL. No perfusion evidence of   core infarct.    CTA HEAD:  Stable high-grade stenosis/occlusion in the V4 segment of the left   vertebral artery.    Stable multivessel multifocal mild stenosis affecting M2/M3 branches of   the bilateral MCAs.    Stable irregular mild stenosis affecting bilateral P1/P2 PCAs.    CTA NECK:  Stable moderate atherosclerotic stenosis in the left carotid bulb (60%).    Stable multisegmental irregular stenosis affecting left vertebral artery   with high-grade stenosis in the ostium.    Partially imaged patchy consolidation in the left upper and lower lobes,   new since the prior exam. The findings likely infectious or inflammatory   in etiology.    < from: TTE Echo Complete w/o Contrast w/ Doppler (10.22.23 @ 08:57) >    Summary:   1. Technically difficult study.   2. Normal global left ventricular systolic function with a biplane EF of   65%. Indeterminate diastolic function. Cannot exclude regional wall   motion abnormalities due to poor endocardial visualization.   3. Normal right ventricular size and function.   4. Normal left atrial size.   5. Mild mitral valve regurgitation.   6. No echocardiographic evidence of pulmonary hypertension.   7. There is no evidence of pericardial effusion.    MEDICATIONS  (STANDING):  aspirin enteric coated 81 milliGRAM(s) Oral daily  atorvastatin 40 milliGRAM(s) Oral at bedtime  cloNIDine Patch 0.1 mG/24Hr(s) 1 patch Transdermal every 7 days  clopidogrel Tablet 75 milliGRAM(s) Oral daily  dextrose 5% + sodium chloride 0.9%. 1000 milliLiter(s) (75 mL/Hr) IV Continuous <Continuous>  enoxaparin Injectable 40 milliGRAM(s) SubCutaneous every 24 hours  levoFLOXacin IVPB 750 milliGRAM(s) IV Intermittent every 24 hours  levoFLOXacin IVPB      lisinopril 10 milliGRAM(s) Oral daily  scopolamine 1 mG/72 Hr(s) Patch 1 Patch Transdermal every 72 hours  tamsulosin 0.4 milliGRAM(s) Oral at bedtime    MEDICATIONS  (PRN):  OLANZapine 2.5 milliGRAM(s) Oral every 8 hours PRN agitation

## 2023-10-29 NOTE — PROGRESS NOTE ADULT - ASSESSMENT
74y/o male with no PMH brought in by niece for concerns of increasing weakness, agitation and inability to take care of himself.     A/P   #Acute  embolic CVA/ AMS/ suspected metabolic encephalopathy/ vascular dementia   - repeat HCT is negative for acute pathology.  - Brain MRI: small scattered acute infarcts in the left frontal and parietal subcortical white matter.  - CTA neck: Moderate stenosis (50%) in the proximal left cervical ICA resulting in moderate. Mild focal stenosis at the origin of the right vertebral artery.   - c/w remote telemetry - no events so far   - TTE noted   - c/w neuro checks Q 6 hours, neurology follow up   - pt  needs  DAPT x 21 days , on po diet now, takes med   - supportive care     #LLL PNA   -  started on Levofloxacin on 10/25  - supportive care, prevent fall and aspiration     # Dysphagia/ Poor appetite   - s/p barium swallow on 10/26, cleared by  speech and swallow for po diet   - start Calories count   - aspiration precautions     #Vascular  Dementia with behavioural disturbances   - Reversible causes of dementia ruled out - CT Head negative, b12, folate, TSH, VDRL   - MRI c spine negative   - MRI brain showed acute CVA   - c/w Zyprexa PRN for agitation   - pt completed the course of  Ampicillin for UTI    - consulted by  Psych, recommendations noted     #Urinary retention /Gross Hematuria  /BPH  - c/w IV hydration , monitor urine output   - get kidney/ bladder US noted   - passed TOV 10/19, now retaining again, mccormick placed again 10/23  - c/w Flomax (once has NGT)   - PSA level wnl    - followed up on 10/25 and flushed Mccormick, will flush today again     #ARNULFO  - suspect pre-renal   - resolved after hydration     # Isolated elevated ALP  - follow up outpatient     DVT ppx     Pending: one to one feedings, f/u calories count,  PT/rehab  and OT, flush  Mccormick, pt developed hematuria again,  monitor output,   [facility], replete potassium   Plan of care d/w pt's family member Mickie Mejia at length on 10/27  Dispo: TBD   Yes

## 2023-10-29 NOTE — CHART NOTE - NSCHARTNOTEFT_GEN_A_CORE
Registered Dietitian Follow-Up -- RD consulted for: Pressure ulcer and was NPO for awhile      Patient Profile Reviewed                           Yes [x]   No []     Nutrition History Previously Obtained        Yes []  No [x]       Pertinent Subjective Information: Diet advanced on 10/26 from NPO to pureed with mildly thick liquids. Pt has poor appetite; consuming <51% of meals provided in-house. Total feed required.      Pertinent Medical Information: 74 y/o male with no PMH brought in by niece for concerns of increasing weakness, agitation and inability to take care of himself.     Per MD note on 10/28:   #dysphagia  10/24  - failed speech and swallow x 2  - Family refused NG tube  10/26  - Patient improved clinically  - s/p Barium swallow: Speech and swallow recommends: puree and mildly thick liquids. 1:1 feed alternate bites and sips. Small bites and sips.    Per SLP note on 10/26: Recommend puree with mildly thick liquids via alternating and multiple swallows. Mild-mod pharyngeal dysphagia; pt likely will not consume enough by mouth and should consider supplemental means of nutrition/hydration.     Diet order: Diet, Pureed:   Mildly Thick Liquids (MILDTHICKLIQS) (10-26-23 @ 11:38) [Active]    Anthropometrics:  Weight: 59 KG -- no new dosing weights  Height: 177.8 cm  BMI: 18.7  IBW: 75.5 KG    MEDICATIONS  (STANDING):  aspirin enteric coated 81 milliGRAM(s) Oral daily  atorvastatin 40 milliGRAM(s) Oral at bedtime  cloNIDine Patch 0.1 mG/24Hr(s) 1 patch Transdermal every 7 days  clopidogrel Tablet 75 milliGRAM(s) Oral daily  dextrose 5% + sodium chloride 0.9%. 1000 milliLiter(s) (75 mL/Hr) IV Continuous <Continuous>  enoxaparin Injectable 40 milliGRAM(s) SubCutaneous every 24 hours  levoFLOXacin IVPB      levoFLOXacin IVPB 750 milliGRAM(s) IV Intermittent every 24 hours  lisinopril 10 milliGRAM(s) Oral daily  scopolamine 1 mG/72 Hr(s) Patch 1 Patch Transdermal every 72 hours  tamsulosin 0.4 milliGRAM(s) Oral at bedtime    MEDICATIONS  (PRN):  OLANZapine 2.5 milliGRAM(s) Oral every 8 hours PRN agitation    Pertinent Labs: 10-29 @ 09:00: Na 142, BUN 7<L>, Cr 0.7, <H>, K+ 3.8, Phos --, Mg --, Alk Phos 84, ALT/SGPT 7, AST/SGOT 9, HbA1c --    Physical Findings:  - Appearance: confused/disoriented per flow sheet   - GI function: last BM not documented; GI: fecal incontinence noted per flow sheet    - Tubes: no feeding tube  - Oral/Mouth cavity: SLP recs noted above  - Skin: stage II pressure injury to sacrum  - Edema: no edema noted     Nutrition Requirements:  Weight Used: Using ABW -- with consideration for age, BMI, malnutrition     Estimated Energy Needs    Continue []  Adjust []  ENERGY: 8220-3427 kcal/day (30-35 kcal/kg)     Estimated Protein Needs    Continue []  Adjust []  PROTEIN: 75-92 g/day (1.3-1.6 g/kg)     Estimated Fluid Needs        Continue []  Adjust []  FLUID: 1mL/kcal     [x] Previous Nutrition Diagnosis: Severe Malnutrition            [x] Ongoing          [] Resolved    [] No active nutrition diagnosis identified at this time     Nutrition Education: Deferred at this time     Goal/Expected Outcome: Pt to consume >50% of meals provided in-house within 3-5 days     Indicator/Monitoring: Diet order, PO intake, weights, labs, NFPF, body composition, BM and tolerance to medical food supplements    Recommendation:  1. ADD MAGIC CUP 3X/DAILY -- provides 870 kcal, 27g pro total; ADD Ensure Plus HP 3X/DAILY -- provides 1050 kcal, 60g pro total (add 2 packets of thickener per carton); ADD Dominic 2X/DAILY to aid in wound healing -- provides 180 kcal, 5g pro total  2. Encourage PO intake and provide 1:1  3. Continue with diet texture/consistency per SLP recs    Pt is at high nutrition risk; will f/u in 3-5 days or prn.    RD to remain available: Heidy Elizondo [phone] or IGNACIA

## 2023-10-30 NOTE — PROGRESS NOTE ADULT - CONVERSATION DETAILS
Spoke with patient at depth regarding advanced directives and future goals of care. Asked patient if he ever discussed advanced directives in the past and he believed that he never had a formal discussion about it. Explained the concept of DNR/DNI to the patient and how that would proceed if his heart ever stopped or if his breathing worsened that he would need intubation. Explained to patient that implementing a DNR/DNI would be similar to allowing him to pass naturally without interventions. Explained that we would do everything right up to the point of requiring resuscitation or intubation but stop medical management if he would require resuscitative measures. Patient was agreeable to initiating DNR/DNI today after he was able to verbalize that he did not want CPR or to be placed on mechanical ventilation. He was agreeable to a trial of NIV such as a bipap if his breathing worsened but did not want to be kept alive on machines. Patient was more awake and alert today where he was able to understand and comprehend the topic of advanced directives and wanted to make a decision while he was more awake.

## 2023-10-30 NOTE — PROGRESS NOTE ADULT - SUBJECTIVE AND OBJECTIVE BOX
24H events:    Patient is a 73y old Male who presents with a chief complaint of Weakness, confusion, inability to ambulate (30 Oct 2023 13:38)    Primary diagnosis of Weakness      Day 1:  Day 2:  Day 3:     Today is hospital day 14d. This morning patient was seen and examined at bedside, resting comfortably in bed.    No acute or major events overnight.    Code Status:    Family communication:  Contact date:  Name of person contacted:  Relationship to patient:  Communication details:  What matters most:    PAST MEDICAL & SURGICAL HISTORY  Hypertension, unspecified type    No significant past surgical history      SOCIAL HISTORY:  Social History:      ALLERGIES:  No Known Allergies    MEDICATIONS:  STANDING MEDICATIONS  aspirin enteric coated 81 milliGRAM(s) Oral daily  atorvastatin 40 milliGRAM(s) Oral at bedtime  chlorhexidine 2% Cloths 1 Application(s) Topical <User Schedule>  cloNIDine Patch 0.1 mG/24Hr(s) 1 patch Transdermal every 7 days  clopidogrel Tablet 75 milliGRAM(s) Oral daily  dextrose 5% + sodium chloride 0.9%. 1000 milliLiter(s) IV Continuous <Continuous>  enoxaparin Injectable 40 milliGRAM(s) SubCutaneous every 24 hours  levoFLOXacin IVPB      levoFLOXacin IVPB 750 milliGRAM(s) IV Intermittent every 24 hours  lisinopril 10 milliGRAM(s) Oral daily  magnesium sulfate  IVPB 2 Gram(s) IV Intermittent once  potassium chloride   Powder 40 milliEquivalent(s) Oral once  scopolamine 1 mG/72 Hr(s) Patch 1 Patch Transdermal every 72 hours  tamsulosin 0.4 milliGRAM(s) Oral at bedtime    PRN MEDICATIONS  OLANZapine 2.5 milliGRAM(s) Oral every 8 hours PRN    VITALS:   T(F): 98.6  HR: 79  BP: 121/65  RR: 18  SpO2: --    PHYSICAL EXAM:  GENERAL:   (  ) NAD, lying in bed comfortably     (  ) obtunded     (  ) lethargic     (  ) somnolent    HEAD:   (  ) Atraumatic     (  ) hematoma     (  ) laceration (specify location:       )     NECK:  (  ) Supple     (  ) neck stiffness     (  ) nuchal rigidity     (  )  no JVD     (  ) JVD present ( -- cm)    HEART:  Rate -->     (  ) normal rate     (  ) bradycardic     (  ) tachycardic  Rhythm -->     (  ) regular     (  ) regularly irregular     (  ) irregularly irregular  Murmurs -->     (  ) normal s1s2     (  ) systolic murmur     (  ) diastolic murmur     (  ) continuous murmur      (  ) S3 present     (  ) S4 present    LUNGS:   (  )Unlabored respirations     (  ) tachypnea  (  ) B/L air entry     (  ) decreased breath sounds in:  (location     )    (  ) no adventitious sound     (  ) crackles     (  ) wheezing      (  ) rhonchi      (specify location:       )  (  ) chest wall tenderness (specify location:       )    ABDOMEN:   (  ) Soft     (  ) tense   |   (  ) nondistended     (  ) distended   |   (  ) +BS     (  ) hypoactive bowel sounds     (  ) hyperactive bowel sounds  (  ) nontender     (  ) RUQ tenderness     (  ) RLQ tenderness     (  ) LLQ tenderness     (  ) epigastric tenderness     (  ) diffuse tenderness  (  ) Splenomegaly      (  ) Hepatomegaly      (  ) Jaundice     (  ) ecchymosis     EXTREMITIES:  (  ) Normal     (  ) Rash     (  ) ecchymosis     (  ) varicose veins      (  ) pitting edema     (  ) non-pitting edema   (  ) ulceration     (  ) gangrene:     (location:     )    NERVOUS SYSTEM:    (  ) A&Ox3     (  ) confused     (  ) lethargic  CN II-XII:     (  ) Intact     (  ) deficits found     (Specify:     )   Upper extremities:     (  ) no sensorimotor deficits     (  ) weakness     (  ) loss of proprioception/vibration     (  ) loss of touch/temperature (specify:    )  Lower extremities:     (  ) no sensorimotor deficits     (  ) weakness     (  ) loss of proprioception/vibration     (  ) loss of touch/temperature (specify:    )    SKIN:   (  ) No rashes or lesions     (  ) maculopapular rash     (  ) pustules     (  ) vesicles     (  ) ulcer     (  ) ecchymosis     (specify location:     )    AMPAC score:    (  ) Indwelling Mccormick Catheter:   Date insterted:    Reason (  ) Critical illness     (  ) urinary retention    (  ) Accurate Ins/Outs Monitoring     (  ) CMO patient    (  ) Central Line:   Date inserted:  Location: (  ) Right IJ     (  ) Left IJ     (  ) Right Fem     (  ) Left Fem    (  ) SPC        (  ) pigtail       (  ) PEG tube       (  ) colostomy       (  ) jejunostomy  (  ) U-Dall    LABS:                        9.6    6.58  )-----------( 212      ( 30 Oct 2023 08:06 )             29.8     10-30    140  |  106  |  6<L>  ----------------------------<  111<H>  3.3<L>   |  27  |  0.7    Ca    8.2<L>      30 Oct 2023 08:06  Mg     1.7     10-30    TPro  4.8<L>  /  Alb  2.9<L>  /  TBili  0.2  /  DBili  x   /  AST  10  /  ALT  8   /  AlkPhos  80  10-30      Urinalysis Basic - ( 30 Oct 2023 08:06 )    Color: x / Appearance: x / SG: x / pH: x  Gluc: 111 mg/dL / Ketone: x  / Bili: x / Urobili: x   Blood: x / Protein: x / Nitrite: x   Leuk Esterase: x / RBC: x / WBC x   Sq Epi: x / Non Sq Epi: x / Bacteria: x      RADIOLOGY:      Assessment and Plan:   · Assessment	  74y/o male with no PMH brought in by niece for concerns of increasing weakness, agitation and inability to take care of himself.       #Acute  embolic CVA/ AMS/ suspected metabolic encephalopathy/ vascular dementia   - repeat HCT is negative for acute pathology.  - Brain MRI: small scattered acute infarcts in the left frontal and parietal subcortical white matter.  - CTA neck: Moderate stenosis (50%) in the proximal left cervical ICA resulting in moderate. Mild focal stenosis at the origin of the right vertebral artery.   - c/w remote telemetry - no events so far   - TTE noted   - c/w neuro checks Q 6 hours  - pt  needs  DAPT x 21 days , on po diet now, takes med   - supportive care     #LLL PNA   -  started on Levofloxacin on 10/25  - supportive care, prevent fall and aspiration     # Dysphagia/ Poor appetite   - s/p barium swallow on 10/26, cleared by  speech and swallow for po diet   - passed calorie count  - aspiration precautions     #Vascular  Dementia with behavioural disturbances   - Reversible causes of dementia ruled out - CT Head negative, b12, folate, TSH, VDRL   - MRI c spine negative   - MRI brain showed acute CVA   - c/w Zyprexa PRN for agitation   - pt completed the course of  Ampicillin for UTI    - consulted by  Psych, recommendations noted     #Urinary retention /Gross Hematuria  /BPH  - c/w IV hydration , monitor urine output   - get kidney/ bladder US noted   - passed TOV 10/19, now retaining again, mccormick placed again 10/23  - c/w Flomax (once has NGT)   - PSA level wnl    -  followed up on 10/25 and flushed Mccormick, will flush today again   - Called urology(10/30) for followup for hematuria : no recommendations    #ARNULFO  - suspect pre-renal   - resolved after hydration     # Isolated elevated ALP  - follow up outpatient     DVT ppx   Dispo: TBD

## 2023-10-30 NOTE — PROGRESS NOTE ADULT - SUBJECTIVE AND OBJECTIVE BOX
72y/o male with no PMH brought in by niece for concerns of increasing weakness, agitation and inability to take care of himself. He did not see doctors for years, was found to have uncontrolled HTN, urinary retention diagnosed with acute ischemic CVA on this admission.   Today pt is awake and alert, eating without held, answering questions, decided to sing DNR/DNI.     PAST MEDICAL & SURGICAL HISTORY:  Hypertension, unspecified type  No significant past surgical history    Vital Signs Last 24 Hrs  T(C): 37.1 (30 Oct 2023 04:35), Max: 37.1 (30 Oct 2023 04:35)  T(F): 98.7 (30 Oct 2023 04:35), Max: 98.7 (30 Oct 2023 04:35)  HR: 74 (30 Oct 2023 04:35) (74 - 101)  BP: 142/65 (30 Oct 2023 04:35) (118/57 - 142/65)  BP(mean): --  RR: 18 (30 Oct 2023 04:35) (18 - 18)      PHYSICAL EXAM:  GENERAL: NAD   HEAD:  Atraumatic, Normocephalic  EYES:  conjunctiva and sclera clear  NECK: Supple, No JVD  CHEST/LUNG: decreased BS at base   HEART: Regular rate and rhythm; No murmurs, rubs, or gallops  ABDOMEN: Soft, Nontender, Nondistended; Bowel sounds present  NORIEGA cath noted with bloody urine   EXTREMITIES:  2+ Peripheral Pulses, No clubbing, cyanosis, or edema  NEUROLOGY: awake, answering questions, demented  moving all extremities, follows commands, speech slowed  SKIN: No rashes or lesions    Labs Reviewed                          9.6    6.58  )-----------( 212      ( 30 Oct 2023 08:06 )             29.8   10-30    140  |  106  |  6<L>  ----------------------------<  111<H>  3.3<L>   |  27  |  0.7    Ca    8.2<L>      30 Oct 2023 08:06  Mg     1.7     10-30    TPro  4.8<L>  /  Alb  2.9<L>  /  TBili  0.2  /  DBili  x   /  AST  10  /  ALT  8   /  AlkPhos  80  10-30        Culture - Blood in AM (10.23.23 @ 07:34)   Specimen Source: .Blood None  Culture Results:   No growth at 24 hours  Culture - Urine (10.22.23 @ 11:20)   Specimen Source: Catheterized Catheterized  Culture Results:   No growth    RADIOLOGY:   < from: Xray Chest 1 View- PORTABLE-Routine (Xray Chest 1 View- PORTABLE-Routine .) (10.25.23 @ 09:12) >  Impression:    Left lower lobe pneumonia, new.    < end of copied text >  < from: CT Angio Neck Stroke Protocol w/ IV Cont (10.25.23 @ 08:10) >  IMPRESSION:    CT PERFUSION:  Motion degraded exam with small region of delayed perfusion in the right   parietal lobe along the MCA territory and in the left CP angle cistern   region (likely artifactual) measuring 32 mL. No perfusion evidence of   core infarct.    CTA HEAD:  Stable high-grade stenosis/occlusion in the V4 segment of the left   vertebral artery.    Stable multivessel multifocal mild stenosis affecting M2/M3 branches of   the bilateral MCAs.    Stable irregular mild stenosis affecting bilateral P1/P2 PCAs.    CTA NECK:  Stable moderate atherosclerotic stenosis in the left carotid bulb (60%).    Stable multisegmental irregular stenosis affecting left vertebral artery   with high-grade stenosis in the ostium.    Partially imaged patchy consolidation in the left upper and lower lobes,   new since the prior exam. The findings likely infectious or inflammatory   in etiology.    < from: TTE Echo Complete w/o Contrast w/ Doppler (10.22.23 @ 08:57) >    Summary:   1. Technically difficult study.   2. Normal global left ventricular systolic function with a biplane EF of   65%. Indeterminate diastolic function. Cannot exclude regional wall   motion abnormalities due to poor endocardial visualization.   3. Normal right ventricular size and function.   4. Normal left atrial size.   5. Mild mitral valve regurgitation.   6. No echocardiographic evidence of pulmonary hypertension.   7. There is no evidence of pericardial effusion.    MEDICATIONS  (STANDING):  aspirin enteric coated 81 milliGRAM(s) Oral daily  atorvastatin 40 milliGRAM(s) Oral at bedtime  chlorhexidine 2% Cloths 1 Application(s) Topical <User Schedule>  cloNIDine Patch 0.1 mG/24Hr(s) 1 patch Transdermal every 7 days  clopidogrel Tablet 75 milliGRAM(s) Oral daily  dextrose 5% + sodium chloride 0.9%. 1000 milliLiter(s) (75 mL/Hr) IV Continuous <Continuous>  enoxaparin Injectable 40 milliGRAM(s) SubCutaneous every 24 hours  levoFLOXacin IVPB      levoFLOXacin IVPB 750 milliGRAM(s) IV Intermittent every 24 hours  lisinopril 10 milliGRAM(s) Oral daily  magnesium sulfate  IVPB 2 Gram(s) IV Intermittent once  potassium chloride   Powder 40 milliEquivalent(s) Oral once  scopolamine 1 mG/72 Hr(s) Patch 1 Patch Transdermal every 72 hours  tamsulosin 0.4 milliGRAM(s) Oral at bedtime    MEDICATIONS  (PRN):  OLANZapine 2.5 milliGRAM(s) Oral every 8 hours PRN agitation

## 2023-10-30 NOTE — PROGRESS NOTE ADULT - SUBJECTIVE AND OBJECTIVE BOX
HPI: 73M with no know prior PMH here with increasing weakness, agitation and inability to take care of himself. Found to have suspected dementia with behavioral disturbances, and possible UTI, started on zyprexa and UTI. Found to have acute CVA on MRI, on neurochecks, failed SLP eval as well, now NPO. Also with ARNULFO, likely pre-renal, improved with hydration. Palliative care consulted for [facility].      ADVANCE DIRECTIVES:    [x ] Full Code [ ] DNR  MOLST  [ ]  Living Will  [ ]   DECISION MAKER(s):  [ ] Health Care Proxy(s)  [ ] Surrogate(s)  [ ] Guardian           Name(s): Phone Number(s):   brother Shaggy [phone]  niece Dr. Corado [phone]    BASELINE (I)ADL(s) (prior to admission):  Real: [ ]Total  [ ] Moderate [ ]Dependent  Palliative Performance Status Version 2:         %    [email or web address]/files/[email or web address]_performance_scale_[email or web address].pdf    Allergies    No Known Allergies    Intolerances    MEDICATIONS  (STANDING):  aspirin enteric coated 81 milliGRAM(s) Oral daily  atorvastatin 40 milliGRAM(s) Oral at bedtime  chlorhexidine 2% Cloths 1 Application(s) Topical <User Schedule>  cloNIDine Patch 0.1 mG/24Hr(s) 1 patch Transdermal every 7 days  clopidogrel Tablet 75 milliGRAM(s) Oral daily  dextrose 5% + sodium chloride 0.9%. 1000 milliLiter(s) (75 mL/Hr) IV Continuous <Continuous>  enoxaparin Injectable 40 milliGRAM(s) SubCutaneous every 24 hours  levoFLOXacin IVPB      levoFLOXacin IVPB 750 milliGRAM(s) IV Intermittent every 24 hours  lisinopril 10 milliGRAM(s) Oral daily  scopolamine 1 mG/72 Hr(s) Patch 1 Patch Transdermal every 72 hours  tamsulosin 0.4 milliGRAM(s) Oral at bedtime    MEDICATIONS  (PRN):  OLANZapine 2.5 milliGRAM(s) Oral every 8 hours PRN agitation          PRESENT SYMPTOMS: [ ] Unable to obtain due to poor mentation   Source if other than patient:  [ ]Family   [ ]Team     Pain: [ ]yes [ X]no  QOL impact -   Location -                    Aggravating factors -  Quality -  Radiation -  Timing-  Severity (0-10 scale):  Minimal acceptable level (0-10 scale):     CPOT:    https://www.[email or web address]/getattachment/[email or web address]/Critical-Care-Pain-Observation-Tool-(CPOT)    PAIN AD Score:   http://geriatrictoolkit.[email or web address]/cog/painad.pdf (press ctrl +  left click to view)    Dyspnea:                           [ x]None[ ]Mild [ ]Moderate [ ]Severe     Respiratory Distress Observation Scale (RDOS):   A score of 0 to 2 signifies little or no respiratory distress, 3 signifies mild distress, scores 4 to 6 indicate moderate distress, and scores greater than 7 signify severe distress  [email or web address]://[email or web address]/sites/default/files/PDFS/306553-fgxxkapqfng-vwtmktor-ytmjrmurhaz-iyqoq.pdf    Anxiety:                             [ ]None[ ]Mild [ ]Moderate [ ]Severe   Fatigue:                             [ ]None[ ]Mild [ ]Moderate [ ]Severe   Nausea:                             [ ]None[ ]Mild [ ]Moderate [ ]Severe   Loss of appetite:              [ ]None[ ]Mild [ ]Moderate [ ]Severe   Constipation:                    [ ]None[ ]Mild [ ]Moderate [ ]Severe    Other Symptoms:  [X ]All other review of systems negative     Palliative Performance Status Version 2:         %    http://[email or web address]/files/news/palliative_performance_scale_[email or web address].pdf  PHYSICAL EXAM:    Vital Signs Last 24 Hrs  T(C): 37.1 (30 Oct 2023 04:35), Max: 37.1 (30 Oct 2023 04:35)  T(F): 98.7 (30 Oct 2023 04:35), Max: 98.7 (30 Oct 2023 04:35)  HR: 74 (30 Oct 2023 04:35) (74 - 101)  BP: 142/65 (30 Oct 2023 04:35) (118/57 - 142/65)  BP(mean): --  RR: 18 (30 Oct 2023 04:35) (18 - 18)  SpO2: --              GENERAL:  [X ] No acute distress [ ]Lethargic  [ ]Unarousable  [ ]Verbal  [ ]Non-Verbal [ ]Cachexia    BEHAVIORAL/PSYCH:  [ ]Alert and Oriented x  [ ] Anxiety [ ] Delirium [ ] Agitation [X ] Calm   EYES: [X ] No scleral icterus [ ] Scleral icterus [ ] Closed  ENMT:  [ ]Dry mouth  [ ]No external oral lesions [X ] No external ear or nose lesions  CARDIOVASCULAR:  [ ]Regular [ ]Irregular [ ]Tachy [ ]Not Tachy  [ ]Ata [ ] Edema [x ] No edema  PULMONARY:  [ ]Tachypnea  [ ]Audible excessive secretions [X ] No labored breathing [ ] labored breathing  GASTROINTESTINAL: [ ]Soft  [ ]Distended  [ X]Not distended [ ]Non tender [ ]Tender  MUSCULOSKELETAL: [ ]No clubbing [ ] clubbing  [ X] No cyanosis [ ] cyanosis  NEUROLOGIC: [ ]No focal deficits  [ X]Follows commands  [ ]Does not follow commands  [ ]Cognitive impairment  [ ]Dysphagia  [ ]Dysarthria  [ ]Paresis   SKIN: [ ] Jaundiced [X ] Non-jaundiced [ ]Rash [ ]No Rash [ ] Warm [ ] Dry  MISC/LINES: [ ] ET tube [ ] Trach [ ]NGT/OGT [ ]PEG [ ]Han    CRITICAL CARE:  [ ] Shock Present  [ ]Septic [ ]Cardiogenic [ ]Neurologic [ ]Hypovolemic  [ ]  Vasopressors [ ]  Inotropes   [ ]Respiratory failure present [ ]Mechanical ventilation [ ]Non-invasive ventilatory support [ ]High flow  [ ]Acute  [ ]Chronic [ ]Hypoxic  [ ]Hypercarbic [ ]Other  [ ]Other organ failure     LABS: reviewed by me                                     9.6    6.58  )-----------( 212      ( 30 Oct 2023 08:06 )             29.8     10-30    140  |  106  |  6<L>  ----------------------------<  111<H>  3.3<L>   |  27  |  0.7    Ca    8.2<L>      30 Oct 2023 08:06  Mg     1.7     10-30    TPro  4.8<L>  /  Alb  2.9<L>  /  TBili  0.2  /  DBili  x   /  AST  10  /  ALT  8   /  AlkPhos  80  10-30      Urinalysis Basic - ( 30 Oct 2023 08:06 )    Color: x / Appearance: x / SG: x / pH: x  Gluc: 111 mg/dL / Ketone: x  / Bili: x / Urobili: x   Blood: x / Protein: x / Nitrite: x   Leuk Esterase: x / RBC: x / WBC x   Sq Epi: x / Non Sq Epi: x / Bacteria: x                RADIOLOGY & ADDITIONAL STUDIES: reviewed by me  CXR 10/23/23    No radiographic evidence of focal consolidation, pleural effusion, or   pneumothorax.    PROTEIN CALORIE MALNUTRITION PRESENT: [ ]mild [ ]moderate [ ]severe [ ]underweight [ ]morbid obesity  https://www.[facility].org/vault/2440/web/files/ONC/Table_Clinical%20Characteristics%20to%20Document%20Malnutrition-White%20JV%20et%20al%202012.pdf    Height (cm): 177.8 (10-20-23 @ 12:18)  Weight (kg): 59 (10-20-23 @ 12:18)  BMI (kg/m2): 18.7 (10-20-23 @ 12:18)    [ ]PPSV2 < or = to 30% [ ]significant weight loss  [ ]poor nutritional intake  [ ]anasarca      [ ]Artificial Nutrition          Palliative Care Spiritual/Emotional Screening Tool Question  Severity (0-4):                    OR                    [X ] Unable to determine/NA  Score of 2 or greater indicates recommendation of Chaplaincy referral  Chaplaincy Referral: [ ] Yes [ ] Refused [ ] Following     Caregiver [place]:  [ ] Yes [ ] No    OR    [x ] Unable to determine. Will assess at later time if appropriate.  Social Work Referral [ ]  Patient and Family Centered Care Referral [ ]    Anticipatory Grief Present: [ ] Yes [ ] No    OR     [ x] Unable to determine. Will assess at later time if appropriate.  Social Work Referral [ ]  Patient and Family Centered Care Referral [ ]    REFERRALS:   [ ]Chaplaincy  [ ]Hospice  [ ]Child Life  [ ]Social Work  [ ]Case management [ ]Holistic Therapy     Palliative care education provided to patient and/or family    Goals of Care Document:

## 2023-10-30 NOTE — PROGRESS NOTE ADULT - ASSESSMENT
74y/o male with no PMH brought in by niece for concerns of increasing weakness, agitation and inability to take care of himself.     A/P   #Acute  embolic CVA/ AMS/ suspected metabolic encephalopathy/ vascular dementia   - repeat HCT is negative for acute pathology.  - Brain MRI: small scattered acute infarcts in the left frontal and parietal subcortical white matter.  - CTA neck: Moderate stenosis (50%) in the proximal left cervical ICA resulting in moderate. Mild focal stenosis at the origin of the right vertebral artery.   - may d/c remote telemetry - no events so far   - TTE noted   - c/w neuro checks Q 6 hours, neurology follow up   - pt  needs  DAPT x 21 days , on po diet now, takes med   - supportive care  - pt passed calories count, eating w/o help today      #LLL PNA   -  started on Levofloxacin on 10/25  - supportive care, prevent fall and aspiration     # Dysphagia/ Poor appetite   - s/p barium swallow on 10/26, cleared by  speech and swallow for po diet   - passed  Calories count   - aspiration precautions     #Vascular  Dementia with behavioural disturbances   - Reversible causes of dementia ruled out - CT Head negative, b12, folate, TSH, VDRL   - MRI c spine negative   - MRI brain showed acute CVA   - c/w Zyprexa PRN for agitation   - pt completed the course of  Ampicillin for UTI    - consulted by  Psych, recommendations noted     #Urinary retention /Gross Hematuria  /BPH  -  monitor urine output   - kidney/ bladder US noted   - passed TOV 10/19, now retaining again, mccormick placed again 10/23  - c/w Flomax (once has NGT)   - PSA level wnl    - hematuria resolved     #ARNULFO  - suspect pre-renal   - resolved after hydration     # Isolated elevated ALP  - follow up outpatient     DVT ppx     Pending: supportive care,   PT/rehab  and OT, monitor output,   possible transfer to [facility] ,  replete potassium and Mg, anticipate discharge in 24 hours   Plan of care d/w pt's family member Mickie Mejia at length on 10/27  Dispo: [id number]

## 2023-10-30 NOTE — PROGRESS NOTE ADULT - ASSESSMENT
73M with no know prior PMH here with increasing weakness, agitation and inability to take care of himself. Found to have suspected dementia with behavioral disturbances, and possible UTI, started on zyprexa and UTI. Found to have acute CVA on MRI, on neurochecks, failed SLP eval as well, now NPO. Also with ARNULFO, likely pre-renal, improved with hydration. Palliative care consulted for [facility].    Spoke with patient at depth regarding advanced directives and future goals of care. Asked patient if he ever discussed advanced directives in the past and he believed that he never had a formal discussion about it. Explained the concept of DNR/DNI to the patient and how that would proceed if his heart ever stopped or if his breathing worsened that he would need intubation. Explained to patient that implementing a DNR/DNI would be similar to allowing him to pass naturally without interventions. Explained that we would do everything right up to the point of requiring resuscitation or intubation but stop medical management if he would require resuscitative measures. Patient was agreeable to initiating DNR/DNI today after he was able to verbalize that he did not want CPR or to be placed on mechanical ventilation. He was agreeable to a trial of NIV such as a bipap if his breathing worsened but did not want to be kept alive on machines. Patient was more awake and alert today where he was able to understand and comprehend the topic of advanced directives and wanted to make a decision while he was more awake.    Will continue to follow

## 2023-10-31 NOTE — PROGRESS NOTE ADULT - SUBJECTIVE AND OBJECTIVE BOX
72y/o male with no PMH brought in by niece for concerns of increasing weakness, agitation and inability to take care of himself. He did not see doctors for years, was found to have uncontrolled HTN, urinary retention diagnosed with acute ischemic CVA on this admission.   Today pt is awake and alert, has good appetite, has no specific complaints, family at the bedside.     PAST MEDICAL & SURGICAL HISTORY:  Hypertension, unspecified type  No significant past surgical history    Vital Signs Last 24 Hrs  T(C): 37 (31 Oct 2023 05:20), Max: 37 (30 Oct 2023 14:49)  T(F): 98.6 (31 Oct 2023 05:20), Max: 98.6 (30 Oct 2023 14:49)  HR: 82 (31 Oct 2023 05:20) (79 - 101)  BP: 161/84 (31 Oct 2023 05:20) (121/65 - 161/84)  BP(mean): --  RR: 18 (30 Oct 2023 20:08) (18 - 18)      PHYSICAL EXAM:  GENERAL: NAD   HEAD:  Atraumatic, Normocephalic  EYES:  conjunctiva and sclera clear  NECK: Supple, No JVD  CHEST/LUNG: decreased BS at base   HEART: Regular rate and rhythm; No murmurs, rubs, or gallops  ABDOMEN: Soft, Nontender, Nondistended; Bowel sounds present  NORIEGA cath noted with bloody urine   EXTREMITIES:  2+ Peripheral Pulses, No clubbing, cyanosis, or edema  NEUROLOGY: awake, answering questions, demented  moving all extremities, follows commands, speech slowed  SKIN: No rashes or lesions    Labs Reviewed                          9.6    7.36  )-----------( 221      ( 31 Oct 2023 06:31 )             29.3   10-31    142  |  106  |  7<L>  ----------------------------<  101<H>  3.3<L>   |  25  |  0.7    Ca    8.2<L>      31 Oct 2023 06:31  Mg     1.6     10-31    TPro  4.8<L>  /  Alb  2.9<L>  /  TBili  0.2  /  DBili  x   /  AST  10  /  ALT  8   /  AlkPhos  80  10-30    Culture - Blood in AM (10.23.23 @ 07:34)   Specimen Source: .Blood None  Culture Results:   No growth at 24 hours  Culture - Urine (10.22.23 @ 11:20)   Specimen Source: Catheterized Catheterized  Culture Results:   No growth    RADIOLOGY:   < from: Xray Chest 1 View- PORTABLE-Routine (Xray Chest 1 View- PORTABLE-Routine .) (10.25.23 @ 09:12) >  Impression:    Left lower lobe pneumonia, new.    < end of copied text >  < from: CT Angio Neck Stroke Protocol w/ IV Cont (10.25.23 @ 08:10) >  IMPRESSION:    CT PERFUSION:  Motion degraded exam with small region of delayed perfusion in the right   parietal lobe along the MCA territory and in the left CP angle cistern   region (likely artifactual) measuring 32 mL. No perfusion evidence of   core infarct.    CTA HEAD:  Stable high-grade stenosis/occlusion in the V4 segment of the left   vertebral artery.    Stable multivessel multifocal mild stenosis affecting M2/M3 branches of   the bilateral MCAs.    Stable irregular mild stenosis affecting bilateral P1/P2 PCAs.    CTA NECK:  Stable moderate atherosclerotic stenosis in the left carotid bulb (60%).    Stable multisegmental irregular stenosis affecting left vertebral artery   with high-grade stenosis in the ostium.    Partially imaged patchy consolidation in the left upper and lower lobes,   new since the prior exam. The findings likely infectious or inflammatory   in etiology.    < from: TTE Echo Complete w/o Contrast w/ Doppler (10.22.23 @ 08:57) >    Summary:   1. Technically difficult study.   2. Normal global left ventricular systolic function with a biplane EF of   65%. Indeterminate diastolic function. Cannot exclude regional wall   motion abnormalities due to poor endocardial visualization.   3. Normal right ventricular size and function.   4. Normal left atrial size.   5. Mild mitral valve regurgitation.   6. No echocardiographic evidence of pulmonary hypertension.   7. There is no evidence of pericardial effusion.    MEDICATIONS  (STANDING):  aspirin enteric coated 81 milliGRAM(s) Oral daily  atorvastatin 40 milliGRAM(s) Oral at bedtime  chlorhexidine 2% Cloths 1 Application(s) Topical <User Schedule>  cloNIDine Patch 0.1 mG/24Hr(s) 1 patch Transdermal every 7 days  clopidogrel Tablet 75 milliGRAM(s) Oral daily  dextrose 5% + sodium chloride 0.9%. 1000 milliLiter(s) (75 mL/Hr) IV Continuous <Continuous>  enoxaparin Injectable 40 milliGRAM(s) SubCutaneous every 24 hours  levoFLOXacin IVPB      levoFLOXacin IVPB 750 milliGRAM(s) IV Intermittent every 24 hours  lisinopril 10 milliGRAM(s) Oral daily  magnesium sulfate  IVPB 2 Gram(s) IV Intermittent once  potassium chloride   Powder 40 milliEquivalent(s) Oral once  potassium chloride  20 mEq/100 mL IVPB 20 milliEquivalent(s) IV Intermittent every 2 hours  scopolamine 1 mG/72 Hr(s) Patch 1 Patch Transdermal every 72 hours  tamsulosin 0.4 milliGRAM(s) Oral at bedtime    MEDICATIONS  (PRN):  OLANZapine 2.5 milliGRAM(s) Oral every 8 hours PRN agitation

## 2023-10-31 NOTE — CHART NOTE - NSCHARTNOTEFT_GEN_A_CORE
3 DAY CALORIE COUNT: 10/28 - 10/30     Diet, Pureed:   Mildly Thick Liquids (MILDTHICKLIQS)  Free Water Flush Instructions:  MAGIC CUP 3X/DAILY; PLEASE ADD 2 PACKETS OF THICKENER PER ENSURE PLUS HIGH PROTEIN  Dominic(7 Gm Arginine/7 Gm Glut/1.2 Gm HMB     Qty per Day:  2  Supplement Feeding Modality:  Oral  Ensure Plus High Protein Cans or Servings Per Day:  3       Frequency:  Daily (10-29-23 @ 11:16) [Pending Verification By Attending]    Diet, Pureed:   Mildly Thick Liquids (MILDTHICKLIQS) (10-26-23 @ 11:38) [Active]      DAY 1 - 10/28  Breakfast: 0% of meal  Lunch: 75% juice, 100% meat, 25% vegetables  Dinner: 100% juice, 100% vegetable, 25% dessert  Total:     DAY 2 - 10/29  Breakfast: 25% juice, 100% yogurt, 100% creamer, 100% cereal, 100% eggs, 25% bread, 100% meat  Lunch: 100% juice, 100% rice, 100% meat, 100% vegetables, 100% soup  Dinner: 100% juice (3x), 100% rice, 100% meat, 100% vegetable, 100% soup, 100% ice cream (3x)  Total:     DAY 3 - 10/30: not documented      Pt has adequate PO intake. Diet with supplements pending activation -- discussed with MD.    RD to remain available: Heidy Elizondo [phone] or TEAMS 3 DAY CALORIE COUNT: 10/28 - 10/30     Diet, Pureed:   Mildly Thick Liquids (MILDTHICKLIQS)  Free Water Flush Instructions:  MAGIC CUP 3X/DAILY; PLEASE ADD 2 PACKETS OF THICKENER PER ENSURE PLUS HIGH PROTEIN  Dominic(7 Gm Arginine/7 Gm Glut/1.2 Gm HMB     Qty per Day:  2  Supplement Feeding Modality:  Oral  Ensure Plus High Protein Cans or Servings Per Day:  3       Frequency:  Daily (10-29-23 @ 11:16) [Pending Verification By Attending]    Diet, Pureed:   Mildly Thick Liquids (MILDTHICKLIQS) (10-26-23 @ 11:38) [Active]      DAY 1 - 10/28  Breakfast: 0% of meal  Lunch: 75% juice, 100% meat, 25% vegetables  Dinner: 100% juice, 100% vegetable, 25% dessert  Total: 477.5 kcal, 24g protein    DAY 2 - 10/29  Breakfast: 25% juice, 100% yogurt, 100% creamer, 100% cereal, 100% eggs, 25% bread, 100% meat  Lunch: 100% juice, 100% rice, 100% meat, 100% vegetables, 100% soup  Dinner: 100% juice (3x), 100% rice, 100% meat, 100% vegetable, 100% soup, 100% ice cream (3x)  Total: 3191 kcal, 160g protein    DAY 3 - 10/30: not documented      Pt has adequate PO intake. Diet with supplements pending activation -- discussed with MD.    RD to remain available: Heidy Elizondo [phone] or TEAMS

## 2023-10-31 NOTE — PROGRESS NOTE ADULT - ASSESSMENT
74y/o male with no PMH brought in by niece for concerns of increasing weakness, agitation and inability to take care of himself.     A/P   #Acute  embolic CVA/ AMS/ suspected metabolic encephalopathy/ vascular dementia   - repeat HCT is negative for acute pathology.  - Brain MRI: small scattered acute infarcts in the left frontal and parietal subcortical white matter.  - CTA neck: Moderate stenosis (50%) in the proximal left cervical ICA resulting in moderate. Mild focal stenosis at the origin of the right vertebral artery.   - TTE noted   - pt  needs  DAPT x 21 days , on po diet now, takes med   - supportive care  - pt passed calories count, eating w/o help now, needs PT     #LLL PNA   -  started on Levofloxacin on 10/25, continue to complete 7 days course   - supportive care, prevent fall and aspiration     # Dysphagia/ Poor appetite   - s/p barium swallow on 10/26, cleared by  speech and swallow for po diet   - passed  Calories count   - aspiration precautions     #Vascular  Dementia with behavioural disturbances   - Reversible causes of dementia ruled out - CT Head negative, b12, folate, TSH, VDRL   - MRI c spine negative   - MRI brain showed acute CVA   - c/w Zyprexa PRN for agitation   - pt completed the course of  Ampicillin for UTI    - consulted by  Psych, recommendations noted     #Urinary retention /Gross Hematuria  /BPH  -  monitor urine output   - kidney/ bladder US noted   - passed TOV 10/19, now retaining again, mccormick placed again 10/23  - if pt'll stay in the hospital for a few more days and participates with PT, may proceed with TOV   - c/w Flomax  - PSA level wnl    - hematuria resolved     #ARNULFO  - suspect pre-renal   - resolved after hydration     # Isolated elevated ALP  - follow up outpatient     # Pt is DNR/DNI   DVT ppx     Pending:  supportive care, encourage PT, if  pt'll participates attempt TOV, replete potassium and magnesium, pt is medically cleared, awaiting for SNF placement.   Plan of care d/w pt's family members ( pt's brothers at the bedside)  length, they agreed with a plan of care   Dispo: SNF

## 2023-11-01 NOTE — CONSULT NOTE ADULT - CONSULT REASON
GOC
weakness / confusion
CVA
urinary retention, blood at tip of meatus (hx CIC)
Dysphagia
Skin assessment and treatment recommendation

## 2023-11-01 NOTE — PROGRESS NOTE ADULT - SUBJECTIVE AND OBJECTIVE BOX
Pt seen and examined at bedside.     VITAL SIGNS (Last 24 hrs):  T(C): 36.9 (11-01-23 @ 15:00), Max: 36.9 (11-01-23 @ 05:09)  HR: 98 (11-01-23 @ 15:00) (79 - 98)  BP: 114/59 (11-01-23 @ 15:00) (114/59 - 149/64)  RR: 18 (11-01-23 @ 15:00) (18 - 18)  SpO2: --  Wt(kg): --  Daily     Daily     I&O's Summary    31 Oct 2023 07:01  -  01 Nov 2023 07:00  --------------------------------------------------------  IN: 0 mL / OUT: 3300 mL / NET: -3300 mL        PHYSICAL EXAM:  GENERAL: NAD  HEAD:  Atraumatic, Normocephalic  EYES: conjunctiva and sclera clear  NECK: Supple, No JVD  CHEST/LUNG: Clear to auscultation bilaterally; No wheeze  HEART: Regular rate and rhythm; No murmurs, rubs, or gallops  ABDOMEN: Soft, Nontender, Nondistended; Bowel sounds present  EXTREMITIES:  2+ Peripheral Pulses, No clubbing, cyanosis, or edema  SKIN: No rashes or lesions    Labs Reviewed      CBC Full  -  ( 01 Nov 2023 09:22 )  WBC Count : 7.54 K/uL  Hemoglobin : 9.8 g/dL  Hematocrit : 30.5 %  Platelet Count - Automated : 282 K/uL  Mean Cell Volume : 90.5 fL  Mean Cell Hemoglobin : 29.1 pg  Mean Cell Hemoglobin Concentration : 32.1 g/dL  Auto Neutrophil # : x  Auto Lymphocyte # : x  Auto Monocyte # : x  Auto Eosinophil # : x  Auto Basophil # : x  Auto Neutrophil % : x  Auto Lymphocyte % : x  Auto Monocyte % : x  Auto Eosinophil % : x  Auto Basophil % : x    BMP:    11-01 @ 09:22    Blood Urea Nitrogen - 7  Calcium - 8.7  Carbond Dioxide - 30  Chloride - 106  Creatinine - 0.8  Glucose - 96  Potassium - 4.6  Sodium - 141      Hemoglobin A1c -     Urine Culture:            MEDICATIONS  (STANDING):  aspirin enteric coated 81 milliGRAM(s) Oral daily  atorvastatin 40 milliGRAM(s) Oral at bedtime  chlorhexidine 2% Cloths 1 Application(s) Topical <User Schedule>  cloNIDine Patch 0.1 mG/24Hr(s) 1 patch Transdermal every 7 days  clopidogrel Tablet 75 milliGRAM(s) Oral daily  dextrose 5% + sodium chloride 0.9%. 1000 milliLiter(s) (75 mL/Hr) IV Continuous <Continuous>  enoxaparin Injectable 40 milliGRAM(s) SubCutaneous every 24 hours  levoFLOXacin IVPB 750 milliGRAM(s) IV Intermittent every 24 hours  levoFLOXacin IVPB      lisinopril 10 milliGRAM(s) Oral daily  scopolamine 1 mG/72 Hr(s) Patch 1 Patch Transdermal every 72 hours  tamsulosin 0.4 milliGRAM(s) Oral at bedtime    MEDICATIONS  (PRN):  OLANZapine 2.5 milliGRAM(s) Oral every 8 hours PRN agitation

## 2023-11-01 NOTE — PROGRESS NOTE ADULT - ASSESSMENT
73M with no know prior PMH here with increasing weakness, agitation and inability to take care of himself. Found to have suspected dementia with behavioral disturbances, and possible UTI, started on zyprexa and UTI. Found to have acute CVA on MRI, on neurochecks, failed SLP eval as well, now NPO. Also with ARNULFO, likely pre-renal, improved with hydration. Palliative care consulted for GO.      Will continue to follow

## 2023-11-01 NOTE — CONSULT NOTE ADULT - NS ATTEND AMEND GEN_ALL_CORE FT
seen on oct 18 2023  agree with above  kathyaddr stone noted  can be addressed as outpatient  hematuria likely traumatic from straight cath - possible relation to bladder stone  trial of void before discharge if closer to baseline
Complex patient  Cryptogenic Stroke  recommend ILR implant  I discussed with patient the placement of an implantable loop recorder for long-term detection of atrial tachyarrhythmias including Atrial Fibrillation, as a possible cause of cryptogenic stroke. I explained to patient in great details, that in case of presence of atrial fibrillation, full dose anticoagulation needs to be started. I explained risks, benefits, alternatives, the nature of the procedure, and follow up care after device is implanted. We also discussed remote monitoring in detail, including monthly billing, and the possibility of copays/deductibles. I also discussed the risks of bleeding, hematoma, infection, device malfunction, device recalls/advisory, and sensitivity to cardiac monitor material.  Patient/family expressed understanding of discussion and agreement to proceed with the implantation of a loop recorder.

## 2023-11-01 NOTE — PROGRESS NOTE ADULT - SUBJECTIVE AND OBJECTIVE BOX
24H events:    Patient is a 73y old Male who presents with a chief complaint of Weakness, confusion, inability to ambulate (01 Nov 2023 15:51)    Primary diagnosis of Weakness    Today is hospital day 16d. This morning patient was seen and examined at bedside, resting comfortably in bed.    No acute or major events overnight.    Code Status:    Family communication:  Contact date:  Name of person contacted:  Relationship to patient:  Communication details:  What matters most:    PAST MEDICAL & SURGICAL HISTORY  Hypertension, unspecified type    No significant past surgical history      SOCIAL HISTORY:  Social History:      ALLERGIES:  No Known Allergies    MEDICATIONS:  STANDING MEDICATIONS  aspirin enteric coated 81 milliGRAM(s) Oral daily  atorvastatin 40 milliGRAM(s) Oral at bedtime  chlorhexidine 2% Cloths 1 Application(s) Topical <User Schedule>  cloNIDine Patch 0.1 mG/24Hr(s) 1 patch Transdermal every 7 days  clopidogrel Tablet 75 milliGRAM(s) Oral daily  enoxaparin Injectable 40 milliGRAM(s) SubCutaneous every 24 hours  lisinopril 10 milliGRAM(s) Oral daily  pantoprazole    Tablet 40 milliGRAM(s) Oral before breakfast  tamsulosin 0.4 milliGRAM(s) Oral at bedtime    PRN MEDICATIONS  OLANZapine 2.5 milliGRAM(s) Oral every 8 hours PRN    VITALS:   T(F): 98.4  HR: 98  BP: 114/59  RR: 18  SpO2: --    PHYSICAL EXAM:  GENERAL:   (x  ) NAD, lying in bed comfortably     (  ) obtunded     (  ) lethargic     (  ) somnolent    HEAD:   ( x ) Atraumatic     (  ) hematoma     (  ) laceration (specify location:       )     NECK:  ( x ) Supple     (  ) neck stiffness     (  ) nuchal rigidity     (  )  no JVD     (  ) JVD present ( -- cm)    HEART:  Rate -->     (x  ) normal rate     (  ) bradycardic     (  ) tachycardic  Rhythm -->     ( x ) regular     (  ) regularly irregular     (  ) irregularly irregular  Murmurs -->     ( x ) normal s1s2     (  ) systolic murmur     (  ) diastolic murmur     (  ) continuous murmur      (  ) S3 present     (  ) S4 present    LUNGS:   (  x)Unlabored respirations     (  ) tachypnea  ( x ) B/L air entry     (  ) decreased breath sounds in:  (location     )    (  x) no adventitious sound     (  ) crackles     (  ) wheezing      (  ) rhonchi      (specify location:       )  (  ) chest wall tenderness (specify location:       )    ABDOMEN:   (x  ) Soft     (  ) tense   |   (  ) nondistended     (  ) distended   |   (  ) +BS     (  ) hypoactive bowel sounds     (  ) hyperactive bowel sounds  (x  ) nontender     (  ) RUQ tenderness     (  ) RLQ tenderness     (  ) LLQ tenderness     (  ) epigastric tenderness     (  ) diffuse tenderness  (  ) Splenomegaly      (  ) Hepatomegaly      (  ) Jaundice     (  ) ecchymosis     EXTREMITIES:  (  ) Normal     (  ) Rash     (  ) ecchymosis     (  ) varicose veins      (  ) pitting edema     (  ) non-pitting edema   (  ) ulceration     (  ) gangrene:     (location:     )    NERVOUS SYSTEM:    ( x ) A&Ox3     (  ) confused     (  ) lethargic  CN II-XII:     (  ) Intact     (  ) deficits found     (Specify:     )   Upper extremities:     (  ) no sensorimotor deficits     (  ) weakness     (  ) loss of proprioception/vibration     (  ) loss of touch/temperature (specify:    )  Lower extremities:     (  ) no sensorimotor deficits     (  ) weakness     (  ) loss of proprioception/vibration     (  ) loss of touch/temperature (specify:    )    SKIN:   (  ) No rashes or lesions     (  ) maculopapular rash     (  ) pustules     (  ) vesicles     (  ) ulcer     (  ) ecchymosis     (specify location:     )    AMPAC score:    (  ) Indwelling Mccormick Catheter:   Date insterted:    Reason (  ) Critical illness     (  ) urinary retention    (  ) Accurate Ins/Outs Monitoring     (  ) CMO patient    (  ) Central Line:   Date inserted:  Location: (  ) Right IJ     (  ) Left IJ     (  ) Right Fem     (  ) Left Fem    (  ) SPC        (  ) pigtail       (  ) PEG tube       (  ) colostomy       (  ) jejunostomy  (  ) U-Dall    LABS:                        9.8    7.54  )-----------( 282      ( 01 Nov 2023 09:22 )             30.5     11-01    141  |  106  |  7<L>  ----------------------------<  96  4.6   |  30  |  0.8    Ca    8.7      01 Nov 2023 09:22  Mg     1.6     10-31    TPro  5.0<L>  /  Alb  3.1<L>  /  TBili  0.3  /  DBili  x   /  AST  10  /  ALT  10  /  AlkPhos  86  11-01      Urinalysis Basic - ( 01 Nov 2023 09:22 )    Color: x / Appearance: x / SG: x / pH: x  Gluc: 96 mg/dL / Ketone: x  / Bili: x / Urobili: x   Blood: x / Protein: x / Nitrite: x   Leuk Esterase: x / RBC: x / WBC x   Sq Epi: x / Non Sq Epi: x / Bacteria: x      RADIOLOGY:    Assessment and Plan:   · Assessment	  74y/o male with no PMH brought in by niece for concerns of increasing weakness, agitation and inability to take care of himself.       #Acute  embolic CVA/ AMS/ suspected metabolic encephalopathy/ vascular dementia   - repeat HCT is negative for acute pathology.  - Brain MRI: small scattered acute infarcts in the left frontal and parietal subcortical white matter.  - CTA neck: Moderate stenosis (50%) in the proximal left cervical ICA resulting in moderate. Mild focal stenosis at the origin of the right vertebral artery.   - c/w remote telemetry - no events so far   - TTE noted   - c/w neuro checks Q 6 hours  - pt  needs  DAPT x 21 days , on po diet now, takes med   - supportive care   - EP consulted for loop recorder placement as a part of embolic stroke workup- follow    #LLL PNA   -  started on Levofloxacin on 10/25  - supportive care, prevent fall and aspiration     # Dysphagia/ Poor appetite   - s/p barium swallow on 10/26, cleared by  speech and swallow for po diet   - passed calorie count  - aspiration precautions     #Vascular  Dementia with behavioural disturbances   - Reversible causes of dementia ruled out - CT Head negative, b12, folate, TSH, VDRL   - MRI c spine negative   - MRI brain showed acute CVA   - c/w Zyprexa PRN for agitation   - pt completed the course of  Ampicillin for UTI    - consulted by  Psych, recommendations noted     #Urinary retention /Gross Hematuria  /BPH  - c/w IV hydration , monitor urine output   - get kidney/ bladder US noted   - passed TOV 10/19, now retaining again, mccormick placed again 10/23  - c/w Flomax (once has NGT)   - PSA level wnl    -  followed up on 10/25 and flushed Mccormick, will flush today again   - Called urology(10/30) for followup for hematuria : no recommendations    #ARNULFO  - suspect pre-renal   - resolved after hydration     # Isolated elevated ALP  - follow up outpatient     DVT ppx   Dispo: [facility]

## 2023-11-01 NOTE — CONSULT NOTE ADULT - ASSESSMENT
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 Brain MRI was remarkable for small scattered acute infarcts in the left frontal and parietal subcortical white matter. Electrophysiology was consulted for evaluation for loop recorder placement.  Telemetry reviewed, 1 brief episodes of narrow complex tachycardia noted c/w SVT  EKG reviewed from 10/19/23-10/27/23, NSR no atrial arrhythmia noted  TTE report appreciated, EF wnl    - cryptogenic stroke    Loop recorder implant is recommended to the patient for a long term cardiac arrhythmia monitoring to r/o arrhythmogenic causes of CVA. In light of the current patient's mental status this recommendation was discussed with his niece Mickie over the phone. She would like to talk to the rest of the family tonight and asked us to touch base with her tomorrow regarding the decision.   Continue telemetry monitoring  The patient is a 73-year-old male with no PMH not on any home meds and history of service in [place] during [place] war, lives at home with his sister and is brought in by niece for concerns of increasing weakness, agitation and inability to take care of himself. Brain MRI was remarkable for small scattered acute infarcts in the left frontal and parietal subcortical white matter. Electrophysiology was consulted for evaluation for loop recorder placement.  Telemetry reviewed, 1 brief episodes of narrow complex tachycardia noted c/w SVT  EKG reviewed from 10/19/23-10/27/23, NSR no atrial arrhythmia noted  TTE report appreciated, EF wnl    - cryptogenic stroke    Loop recorder implant is recommended to the patient for a long term cardiac arrhythmia monitoring to r/o arrhythmogenic causes of CVA. In light of the current patient's mental status this recommendation was discussed with his niece Mickie over the phone. She would like to talk to the rest of the family tonight and asked us to touch base with her tomorrow regarding the decision.   Continue telemetry monitoring   Please check TSH

## 2023-11-01 NOTE — CONSULT NOTE ADULT - CONSULT REQUESTED DATE/TIME
17-Oct-2023 16:43
18-Oct-2023 15:06
27-Oct-2023 12:17
19-Oct-2023 15:54
24-Oct-2023 00:00
01-Nov-2023 17:55

## 2023-11-01 NOTE — CONSULT NOTE ADULT - REASON FOR ADMISSION
Weakness, confusion, inability to ambulate

## 2023-11-01 NOTE — PROGRESS NOTE ADULT - PROBLEM SELECTOR PLAN 4
- suspected to have dementia  - supportive care
- suspected to have dementia  - supportive care  - Patient was more awake and alert today, decided on DNR/DNI  - c/w q8 neurochecks high risk.
- suspected to have dementia  - supportive care  - will discuss GOC  - c/w q8 neurochecks high risk.

## 2023-11-01 NOTE — CHART NOTE - NSCHARTNOTEFT_GEN_A_CORE
I spoke to CCU and told them to contact cardiology NP at [facility] to interrogate the device.

## 2023-11-01 NOTE — PROGRESS NOTE ADULT - ASSESSMENT
74y/o male with no PMH brought in by niece for concerns of increasing weakness, agitation and inability to take care of himself.       #Acute  embolic CVA  #Suspected metabolic encephalopathy/ vascular dementia   - psych eval noted - Zyprexa PRN   - Brain MRI: small scattered acute infarcts in the left frontal and parietal subcortical white matter.  - CTA neck: Moderate stenosis (50%) in the proximal left cervical ICA resulting in moderate. Mild focal stenosis at the origin of the right vertebral artery.   - TTE noted   - pt needs  DAPT x 21 days then monotherapy with aspirin afterward   - c/w statin   - GI PPx   - EP eval for ILR   - Neuro f/u     #LLL PNA   - s/p 7 days of Levaquin     #Dysphagia/ Poor appetite   - s/p barium swallow on 10/26, cleared by  speech and swallow for po diet   - passed calorie count   - aspiration precautions     #Possible UTI   - pt completed the course of  Ampicillin for UTI      #Urinary retention /Gross Hematuria  /BPH  - kidney/ bladder US noted   - c/w Flomax  - PSA level wnl    - hematuria resolved     #ARNULFO  - suspect pre-renal   - resolved after hydration     # Isolated elevated ALP  - follow up outpatient     DNR/DNI   DVT ppx     Pending:  EP eval for ILR   Dispo: SNF

## 2023-11-01 NOTE — PROGRESS NOTE ADULT - SUBJECTIVE AND OBJECTIVE BOX
HPI: 73M with no know prior PMH here with increasing weakness, agitation and inability to take care of himself. Found to have suspected dementia with behavioral disturbances, and possible UTI, started on zyprexa and UTI. Found to have acute CVA on MRI, on neurochecks, failed SLP eval as well, now NPO. Also with ARNULFO, likely pre-renal, improved with hydration. Palliative care consulted for GOC.      ADVANCE DIRECTIVES:    [x ] Full Code [ ] DNR  MOLST  [ ]  Living Will  [ ]   DECISION MAKER(s):  [ ] Health Care Proxy(s)  [ ] Surrogate(s)  [ ] Guardian           Name(s): Phone Number(s):   brother Shaggy [phone]  niece Dr. Corado [phone]    BASELINE (I)ADL(s) (prior to admission):  Daniels: [ ]Total  [ ] Moderate [ ]Dependent  Palliative Performance Status Version 2:         %    [email or web address]/[email or web address]_[email or web address]_[email or web address]    Allergies    No Known Allergies    Intolerances    MEDICATIONS  (STANDING):  aspirin enteric coated 81 milliGRAM(s) Oral daily  atorvastatin 40 milliGRAM(s) Oral at bedtime  chlorhexidine 2% Cloths 1 Application(s) Topical <User Schedule>  cloNIDine Patch 0.1 mG/24Hr(s) 1 patch Transdermal every 7 days  clopidogrel Tablet 75 milliGRAM(s) Oral daily  dextrose 5% + sodium chloride 0.9%. 1000 milliLiter(s) (75 mL/Hr) IV Continuous <Continuous>  enoxaparin Injectable 40 milliGRAM(s) SubCutaneous every 24 hours  levoFLOXacin IVPB      levoFLOXacin IVPB 750 milliGRAM(s) IV Intermittent every 24 hours  lisinopril 10 milliGRAM(s) Oral daily  scopolamine 1 mG/72 Hr(s) Patch 1 Patch Transdermal every 72 hours  tamsulosin 0.4 milliGRAM(s) Oral at bedtime    MEDICATIONS  (PRN):  OLANZapine 2.5 milliGRAM(s) Oral every 8 hours PRN agitation          PRESENT SYMPTOMS: [ ] Unable to obtain due to poor mentation   Source if other than patient:  [ ]Family   [ ]Team     Pain: [ ]yes [ X]no  QOL impact -   Location -                    Aggravating factors -  Quality -  Radiation -  Timing-  Severity (0-10 scale):  Minimal acceptable level (0-10 scale):     CPOT:    https://www.[email or web address]/getattachment/[email or web address]/Critical-Care-Pain-Observation-Tool-(CPOT)    PAIN AD Score:   http://geriatrictoolkit.[email or web address]/cog/painad.pdf (press ctrl +  left click to view)    Dyspnea:                           [ x]None[ ]Mild [ ]Moderate [ ]Severe     Respiratory Distress Observation Scale (RDOS):   A score of 0 to 2 signifies little or no respiratory distress, 3 signifies mild distress, scores 4 to 6 indicate moderate distress, and scores greater than 7 signify severe distress  https://[email or web address]/sites/default/files/PDFS/531535-rxvxrqfxyuy-lwpkifps-vqzbywbvhho-adorn.pdf    Anxiety:                             [ ]None[ ]Mild [ ]Moderate [ ]Severe   Fatigue:                             [ ]None[ ]Mild [ ]Moderate [ ]Severe   Nausea:                             [ ]None[ ]Mild [ ]Moderate [ ]Severe   Loss of appetite:              [ ]None[ ]Mild [ ]Moderate [ ]Severe   Constipation:                    [ ]None[ ]Mild [ ]Moderate [ ]Severe    Other Symptoms:  [X ]All other review of systems negative     Palliative Performance Status Version 2:         %    http://[email or web address].org/files/news/palliative_performance_scale_[email or web address].pdf  PHYSICAL EXAM:    Vital Signs Last 24 Hrs  T(C): 36.9 (01 Nov 2023 05:09), Max: 36.9 (01 Nov 2023 05:09)  T(F): 98.4 (01 Nov 2023 05:09), Max: 98.4 (01 Nov 2023 05:09)  HR: 79 (01 Nov 2023 05:09) (79 - 87)  BP: 125/74 (01 Nov 2023 05:09) (125/74 - 157/69)  BP(mean): --  RR: 18 (01 Nov 2023 05:09) (18 - 18)  SpO2: --              GENERAL:  [X ] No acute distress [ ]Lethargic  [ ]Unarousable  [ ]Verbal  [ ]Non-Verbal [ ]Cachexia    BEHAVIORAL/PSYCH:  [ ]Alert and Oriented x  [ ] Anxiety [ ] Delirium [ ] Agitation [X ] Calm   EYES: [X ] No scleral icterus [ ] Scleral icterus [ ] Closed  ENMT:  [ ]Dry mouth  [ ]No external oral lesions [X ] No external ear or nose lesions  CARDIOVASCULAR:  [ ]Regular [ ]Irregular [ ]Tachy [ ]Not Tachy  [ ]Ata [ ] Edema [x ] No edema  PULMONARY:  [ ]Tachypnea  [ ]Audible excessive secretions [X ] No labored breathing [ ] labored breathing  GASTROINTESTINAL: [ ]Soft  [ ]Distended  [ X]Not distended [ ]Non tender [ ]Tender  MUSCULOSKELETAL: [ ]No clubbing [ ] clubbing  [ X] No cyanosis [ ] cyanosis  NEUROLOGIC: [ ]No focal deficits  [ X]Follows commands  [ ]Does not follow commands  [ ]Cognitive impairment  [ ]Dysphagia  [ ]Dysarthria  [ ]Paresis   SKIN: [ ] Jaundiced [X ] Non-jaundiced [ ]Rash [ ]No Rash [ ] Warm [ ] Dry  MISC/LINES: [ ] ET tube [ ] Trach [ ]NGT/OGT [ ]PEG [ ]Han    CRITICAL CARE:  [ ] Shock Present  [ ]Septic [ ]Cardiogenic [ ]Neurologic [ ]Hypovolemic  [ ]  Vasopressors [ ]  Inotropes   [ ]Respiratory failure present [ ]Mechanical ventilation [ ]Non-invasive ventilatory support [ ]High flow  [ ]Acute  [ ]Chronic [ ]Hypoxic  [ ]Hypercarbic [ ]Other  [ ]Other organ failure     LABS: reviewed by me                            9.8    7.54  )-----------( 282      ( 01 Nov 2023 09:22 )             30.5     11-01    141  |  106  |  7<L>  ----------------------------<  96  4.6   |  30  |  0.8    Ca    8.7      01 Nov 2023 09:22  Mg     1.6     10-31    TPro  5.0<L>  /  Alb  3.1<L>  /  TBili  0.3  /  DBili  x   /  AST  10  /  ALT  10  /  AlkPhos  86  11-01      Urinalysis Basic - ( 01 Nov 2023 09:22 )    Color: x / Appearance: x / SG: x / pH: x  Gluc: 96 mg/dL / Ketone: x  / Bili: x / Urobili: x   Blood: x / Protein: x / Nitrite: x   Leuk Esterase: x / RBC: x / WBC x   Sq Epi: x / Non Sq Epi: x / Bacteria: x          RADIOLOGY & ADDITIONAL STUDIES: reviewed by me  CXR 10/23/23    No radiographic evidence of focal consolidation, pleural effusion, or   pneumothorax.    PROTEIN CALORIE MALNUTRITION PRESENT: [ ]mild [ ]moderate [ ]severe [ ]underweight [ ]morbid obesity  https://www.andeal.org/vault/2440/web/files/ONC/Table_Clinical%20Characteristics%20to%20Document%20Malnutrition-White%20JV%20et%20al%202012.pdf    Height (cm): 177.8 (10-20-23 @ 12:18)  Weight (kg): 59 (10-20-23 @ 12:18)  BMI (kg/m2): 18.7 (10-20-23 @ 12:18)    [ ]PPSV2 < or = to 30% [ ]significant weight loss  [ ]poor nutritional intake  [ ]anasarca      [ ]Artificial Nutrition          Palliative Care Spiritual/Emotional Screening Tool Question  Severity (0-4):                    OR                    [X ] Unable to determine/NA  Score of 2 or greater indicates recommendation of Chaplaincy referral  Chaplaincy Referral: [ ] Yes [ ] Refused [ ] Following     Caregiver [place]:  [ ] Yes [ ] No    OR    [x ] Unable to determine. Will assess at later time if appropriate.  Social Work Referral [ ]  Patient and Family Centered Care Referral [ ]    Anticipatory Grief Present: [ ] Yes [ ] No    OR     [ x] Unable to determine. Will assess at later time if appropriate.  Social Work Referral [ ]  Patient and Family Centered Care Referral [ ]    REFERRALS:   [ ]Chaplaincy  [ ]Hospice  [ ]Child Life  [ ]Social Work  [ ]Case management [ ]Holistic Therapy     Palliative care education provided to patient and/or family    Goals of Care Document:

## 2023-11-01 NOTE — CONSULT NOTE ADULT - SUBJECTIVE AND OBJECTIVE BOX
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 Brain MRI was remarkable for small scattered acute infarcts in the left frontal and parietal subcortical white matter. Electrophysiology was consulted for evaluation for loop recorder placement.    REVIEW OF SYSTEMS  [x] A ten-point review of systems was otherwise negative except as noted.  [ ] Due to altered mental status/intubation, subjective information were not able to be obtained from the patient. History was obtained, to the extent possible, from review of the chart and collateral sources of information.      PAST MEDICAL & SURGICAL HISTORY:  Hypertension, unspecified type    No significant past surgical history    Home Medications:    Allergies:  No Known Allergies    FAMILY HISTORY:    SOCIAL HISTORY:    CIGARETTES:  ALCOHOL:  MARIJUANA:  ILLICIT DRUGS:    PREVIOUS DIAGNOSTIC TESTING:      ECHO FINDINGS:  Summary:   1. Technically difficult study.   2. Normal global left ventricular systolic function with a biplane EF of   65%. Indeterminate diastolic function. Cannot exclude regional wall   motion abnormalities due to poor endocardial visualization.   3. Normal right ventricular size and function.   4. Normal left atrial size.   5. Mild mitral valve regurgitation.   6. No echocardiographic evidence of pulmonary hypertension.   7. There is no evidence of pericardial effusion.    STRESS  FINDINGS:    CATHETERIZATION  FINDINGS:    ELECTROPHYSIOLOGY STUDY  FINDINGS:    CAROTID ULTRASOUND:  FINDINGS    VENOUS DUPLEX SCAN:  FINDINGS:    CHEST CT PULMONARY ANGIO with IV Contrast:  FINDINGS:      MEDICATIONS  (STANDING):  aspirin enteric coated 81 milliGRAM(s) Oral daily  atorvastatin 40 milliGRAM(s) Oral at bedtime  chlorhexidine 2% Cloths 1 Application(s) Topical <User Schedule>  cloNIDine Patch 0.1 mG/24Hr(s) 1 patch Transdermal every 7 days  clopidogrel Tablet 75 milliGRAM(s) Oral daily  enoxaparin Injectable 40 milliGRAM(s) SubCutaneous every 24 hours  lisinopril 10 milliGRAM(s) Oral daily  pantoprazole    Tablet 40 milliGRAM(s) Oral before breakfast  tamsulosin 0.4 milliGRAM(s) Oral at bedtime    MEDICATIONS  (PRN):  OLANZapine 2.5 milliGRAM(s) Oral every 8 hours PRN agitation      Vital Signs Last 24 Hrs  T(C): 36.9 (01 Nov 2023 15:00), Max: 36.9 (01 Nov 2023 05:09)  T(F): 98.4 (01 Nov 2023 15:00), Max: 98.4 (01 Nov 2023 05:09)  HR: 98 (01 Nov 2023 15:00) (79 - 98)  BP: 114/59 (01 Nov 2023 15:00) (114/59 - 149/64)  BP(mean): --  RR: 18 (01 Nov 2023 15:00) (18 - 18)  SpO2: --        PHYSICAL EXAM:  GENERAL: In no apparent distress.  EYES: EOMI, PERRLA,  HEART: Regular rate and rhythm;   PULMONARY: Clear to auscultation and perfusion.   NEUROLOGICAL: alert, not oriented      INTERPRETATION OF TELEMETRY:    ECG:    LABS:                        9.8    7.54  )-----------( 282      ( 01 Nov 2023 09:22 )             30.5     11-01    141  |  106  |  7<L>  ----------------------------<  96  4.6   |  30  |  0.8    Ca    8.7      01 Nov 2023 09:22  Mg     1.6     10-31    TPro  5.0<L>  /  Alb  3.1<L>  /  TBili  0.3  /  DBili  x   /  AST  10  /  ALT  10  /  AlkPhos  86  11-01        Urinalysis Basic - ( 01 Nov 2023 09:22 )    Color: x / Appearance: x / SG: x / pH: x  Gluc: 96 mg/dL / Ketone: x  / Bili: x / Urobili: x   Blood: x / Protein: x / Nitrite: x   Leuk Esterase: x / RBC: x / WBC x   Sq Epi: x / Non Sq Epi: x / Bacteria: x          RADIOLOGY & ADDITIONAL STUDIES:  XR CHEST PORTABLE ROUTINE 1V   ORDERED BY: KEVYN HERNANDEZ     PROCEDURE DATE:  10/23/2023      INTERPRETATION:  Clinical History / Reason for exam: Shortness of breath    Comparison : Chest radiograph 5/4/2023.    Technique/Positioning: Frontal view of the chest.    Findings:  Support devices: None.  Cardiac/mediastinum/hilum: Unchanged.  Lung parenchyma/Pleura: Within normal limits.  Skeleton/soft tissues: Unchanged.    Impression:  No radiographic evidence of focal consolidation, pleural effusion, or   pneumothorax.           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 Brain MRI was remarkable for small scattered acute infarcts in the left frontal and parietal subcortical white matter. Electrophysiology was consulted for evaluation for loop recorder placement.    REVIEW OF SYSTEMS  [x] A ten-point review of systems was otherwise negative except as noted.  [ ] Due to altered mental status/intubation, subjective information were not able to be obtained from the patient. History was obtained, to the extent possible, from review of the chart and collateral sources of information.      PAST MEDICAL & SURGICAL HISTORY:  Hypertension, unspecified type    No significant past surgical history    Home Medications:    Allergies:  No Known Allergies    FAMILY HISTORY: no family history of SCD    SOCIAL HISTORY:    CIGARETTES:  ALCOHOL: no  MARIJUANA:  ILLICIT DRUGS: no    PREVIOUS DIAGNOSTIC TESTING:      ECHO FINDINGS:  Summary:   1. Technically difficult study.   2. Normal global left ventricular systolic function with a biplane EF of   65%. Indeterminate diastolic function. Cannot exclude regional wall   motion abnormalities due to poor endocardial visualization.   3. Normal right ventricular size and function.   4. Normal left atrial size.   5. Mild mitral valve regurgitation.   6. No echocardiographic evidence of pulmonary hypertension.   7. There is no evidence of pericardial effusion.        MEDICATIONS  (STANDING):  aspirin enteric coated 81 milliGRAM(s) Oral daily  atorvastatin 40 milliGRAM(s) Oral at bedtime  chlorhexidine 2% Cloths 1 Application(s) Topical <User Schedule>  cloNIDine Patch 0.1 mG/24Hr(s) 1 patch Transdermal every 7 days  clopidogrel Tablet 75 milliGRAM(s) Oral daily  enoxaparin Injectable 40 milliGRAM(s) SubCutaneous every 24 hours  lisinopril 10 milliGRAM(s) Oral daily  pantoprazole    Tablet 40 milliGRAM(s) Oral before breakfast  tamsulosin 0.4 milliGRAM(s) Oral at bedtime    MEDICATIONS  (PRN):  OLANZapine 2.5 milliGRAM(s) Oral every 8 hours PRN agitation      Vital Signs Last 24 Hrs  T(C): 36.9 (01 Nov 2023 15:00), Max: 36.9 (01 Nov 2023 05:09)  T(F): 98.4 (01 Nov 2023 15:00), Max: 98.4 (01 Nov 2023 05:09)  HR: 98 (01 Nov 2023 15:00) (79 - 98)  BP: 114/59 (01 Nov 2023 15:00) (114/59 - 149/64)  BP(mean): --  RR: 18 (01 Nov 2023 15:00) (18 - 18)  SpO2: --        PHYSICAL EXAM:  GENERAL: In no apparent distress.  EYES: EOMI, PERRLA,  HEART: Regular rate and rhythm;   PULMONARY: Clear to auscultation and perfusion.   NEUROLOGICAL: alert, not oriented      INTERPRETATION OF TELEMETRY:    ECG:    LABS:                        9.8    7.54  )-----------( 282      ( 01 Nov 2023 09:22 )             30.5     11-01    141  |  106  |  7<L>  ----------------------------<  96  4.6   |  30  |  0.8    Ca    8.7      01 Nov 2023 09:22  Mg     1.6     10-31    TPro  5.0<L>  /  Alb  3.1<L>  /  TBili  0.3  /  DBili  x   /  AST  10  /  ALT  10  /  AlkPhos  86  11-01        Urinalysis Basic - ( 01 Nov 2023 09:22 )    Color: x / Appearance: x / SG: x / pH: x  Gluc: 96 mg/dL / Ketone: x  / Bili: x / Urobili: x   Blood: x / Protein: x / Nitrite: x   Leuk Esterase: x / RBC: x / WBC x   Sq Epi: x / Non Sq Epi: x / Bacteria: x          RADIOLOGY & ADDITIONAL STUDIES:  XR CHEST PORTABLE ROUTINE 1V   ORDERED BY: KEVYN HERNANDEZ     PROCEDURE DATE:  10/23/2023      INTERPRETATION:  Clinical History / Reason for exam: Shortness of breath    Comparison : Chest radiograph 5/4/2023.    Technique/Positioning: Frontal view of the chest.    Findings:  Support devices: None.  Cardiac/mediastinum/hilum: Unchanged.  Lung parenchyma/Pleura: Within normal limits.  Skeleton/soft tissues: Unchanged.    Impression:  No radiographic evidence of focal consolidation, pleural effusion, or   pneumothorax.

## 2023-11-01 NOTE — PROGRESS NOTE ADULT - PROBLEM SELECTOR PLAN 5
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

## 2023-11-02 NOTE — CHART NOTE - NSCHARTNOTESELECT_GEN_ALL_CORE
Event Note
Follow Up/Nutrition Services
Follow Up/Nutrition Services
Rapid Response
CALORIE COUNT/Nutrition Services
CALORIE COUNT/Nutrition Services
Event Note
Event Note
Palliative/Off Service Note

## 2023-11-02 NOTE — PROGRESS NOTE ADULT - ASSESSMENT
74y/o male with no PMH brought in by niece for concerns of increasing weakness, agitation and inability to take care of himself.       #Acute embolic CVA  #Suspected metabolic encephalopathy 2/2 PNA - resolved   - psych eval noted - Zyprexa PRN   - Brain MRI: small scattered acute infarcts in the left frontal and parietal subcortical white matter.  - CTA neck: Moderate stenosis (50%) in the proximal left cervical ICA resulting in moderate. Mild focal stenosis at the origin of the right vertebral artery.   - TTE noted   - pt needs  DAPT x 21 days then monotherapy with aspirin afterward   - c/w statin   - GI PPx   - EP eval for ILR   - Neuro f/u     #LLL PNA   - s/p 7 days of Levaquin     #Dysphagia/ Poor appetite   - s/p barium swallow on 10/26, cleared by  speech and swallow for po diet   - passed calorie count   - aspiration precautions     #Possible UTI   - pt completed the course of  Ampicillin for UTI      #Urinary retention /Gross Hematuria  /BPH  - kidney/ bladder US noted   - c/w Flomax  - PSA level wnl    - hematuria resolved     #ARNULFO  - suspect pre-renal   - resolved after hydration     #Isolated elevated ALP  - follow up outpatient     DNR/DNI   DVT ppx     Pending:  EP eval for ILR   Plan of care d/w leo Evans over the phone in length   Dispo: SNF

## 2023-11-02 NOTE — PROGRESS NOTE ADULT - SUBJECTIVE AND OBJECTIVE BOX
24H events:    Patient is a 73y old Male who presents with a chief complaint of Weakness, confusion, inability to ambulate (02 Nov 2023 13:09)    Primary diagnosis of Weakness    Today is hospital day 17d. This morning patient was seen and examined at bedside, resting comfortably in bed.    No acute or major events overnight.    Code Status:    Family communication:  Contact date:  Name of person contacted:  Relationship to patient:  Communication details:  What matters most:    PAST MEDICAL & SURGICAL HISTORY  Hypertension, unspecified type    No significant past surgical history      SOCIAL HISTORY:  Social History:      ALLERGIES:  No Known Allergies    MEDICATIONS:  STANDING MEDICATIONS  aspirin enteric coated 81 milliGRAM(s) Oral daily  atorvastatin 40 milliGRAM(s) Oral at bedtime  chlorhexidine 2% Cloths 1 Application(s) Topical <User Schedule>  clopidogrel Tablet 75 milliGRAM(s) Oral daily  enoxaparin Injectable 40 milliGRAM(s) SubCutaneous every 24 hours  lisinopril 10 milliGRAM(s) Oral daily  pantoprazole    Tablet 40 milliGRAM(s) Oral before breakfast  tamsulosin 0.4 milliGRAM(s) Oral at bedtime    PRN MEDICATIONS  OLANZapine 2.5 milliGRAM(s) Oral every 8 hours PRN    VITALS:   T(F): 98.6  HR: 87  BP: 132/59  RR: 18  SpO2: --    PHYSICAL EXAM:  GENERAL:   (  x) NAD, lying in bed comfortably     (  ) obtunded     (  ) lethargic     (  ) somnolent    HEAD:   (  x) Atraumatic     (  ) hematoma     (  ) laceration (specify location:       )   x  NECK:  ( x ) Supple     (  ) neck stiffness     (  ) nuchal rigidity     (  )  no JVD     (  ) JVD present ( -- cm)    HEART:  Rate -->     ( x ) normal rate     (  ) bradycardic     (  ) tachycardic  Rhythm -->     (  x) regular     (  ) regularly irregular     (  ) irregularly irregular  Murmurs -->     (  x) normal s1s2     (  ) systolic murmur     (  ) diastolic murmur     (  ) continuous murmur      (  ) S3 present     (  ) S4 present    LUNGS:   (  x)Unlabored respirations     (  ) tachypnea  (x  ) B/L air entry     (  ) decreased breath sounds in:  (location     )    (  x) no adventitious sound     (  ) crackles     (  ) wheezing      (  ) rhonchi      (specify location:       )  (  ) chest wall tenderness (specify location:       )    ABDOMEN:   ( x ) Soft     (  ) tense   |   (  ) nondistended     (  ) distended   |   (  ) +BS     (  ) hypoactive bowel sounds     (  ) hyperactive bowel sounds  ( x) nontender     (  ) RUQ tenderness     (  ) RLQ tenderness     (  ) LLQ tenderness     (  ) epigastric tenderness     (  ) diffuse tenderness  (  ) Splenomegaly      (  ) Hepatomegaly      (  ) Jaundice     (  ) ecchymosis     EXTREMITIES:  (  ) Normal     (  ) Rash     (  ) ecchymosis     (  ) varicose veins      (  ) pitting edema     (  ) non-pitting edema   (  ) ulceration     (  ) gangrene:     (location:     )    NERVOUS SYSTEM:    (x  ) A&Ox3     (  ) confused     (  ) lethargic  CN II-XII:     (  ) Intact     (  ) deficits found     (Specify:     )   Upper extremities:     (  ) no sensorimotor deficits     (  ) weakness     (  ) loss of proprioception/vibration     (  ) loss of touch/temperature (specify:    )  Lower extremities:     (  ) no sensorimotor deficits     (  ) weakness     (  ) loss of proprioception/vibration     (  ) loss of touch/temperature (specify:    )    SKIN:   (  ) No rashes or lesions     (  ) maculopapular rash     (  ) pustules     (  ) vesicles     (  ) ulcer     (  ) ecchymosis     (specify location:     )    AMPAC score:    (  ) Indwelling Mccormick Catheter:   Date insterted:    Reason (  ) Critical illness     (  ) urinary retention    (  ) Accurate Ins/Outs Monitoring     (  ) CMO patient    (  ) Central Line:   Date inserted:  Location: (  ) Right IJ     (  ) Left IJ     (  ) Right Fem     (  ) Left Fem    (  ) SPC        (  ) pigtail       (  ) PEG tube       (  ) colostomy       (  ) jejunostomy  (  ) U-Dall    LABS:                        10.4   7.74  )-----------( 333      ( 02 Nov 2023 08:53 )             32.0     11-02    142  |  103  |  9<L>  ----------------------------<  101<H>  3.8   |  29  |  0.6<L>    Ca    8.6      02 Nov 2023 08:53    TPro  5.6<L>  /  Alb  3.3<L>  /  TBili  0.3  /  DBili  x   /  AST  13  /  ALT  11  /  AlkPhos  98  11-02      Urinalysis Basic - ( 02 Nov 2023 08:53 )    Color: x / Appearance: x / SG: x / pH: x  Gluc: 101 mg/dL / Ketone: x  / Bili: x / Urobili: x   Blood: x / Protein: x / Nitrite: x   Leuk Esterase: x / RBC: x / WBC x   Sq Epi: x / Non Sq Epi: x / Bacteria: x    RADIOLOGY:    Assessment and Plan:   · Assessment	  72y/o male with no PMH brought in by niece for concerns of increasing weakness, agitation and inability to take care of himself.       #Acute  embolic CVA/ AMS/ suspected metabolic encephalopathy/ vascular dementia   - repeat HCT is negative for acute pathology.  - Brain MRI: small scattered acute infarcts in the left frontal and parietal subcortical white matter.  - CTA neck: Moderate stenosis (50%) in the proximal left cervical ICA resulting in moderate. Mild focal stenosis at the origin of the right vertebral artery.   - c/w remote telemetry - no events so far   - TTE noted   - c/w neuro checks Q 6 hours  - pt  needs  DAPT x 21 days , on po diet now, takes med   - supportive care   - EP consulted for loop recorder placement as a part of embolic stroke workup- plan for today    #LLL PNA   -  started on Levofloxacin on 10/25  - supportive care, prevent fall and aspiration     # Dysphagia/ Poor appetite   - s/p barium swallow on 10/26, cleared by  speech and swallow for po diet   - passed calorie count  - aspiration precautions     #Vascular  Dementia with behavioural disturbances   - Reversible causes of dementia ruled out - CT Head negative, b12, folate, TSH, VDRL   - MRI c spine negative   - MRI brain showed acute CVA   - c/w Zyprexa PRN for agitation   - pt completed the course of  Ampicillin for UTI    - consulted by  Psych, recommendations noted     #Urinary retention /Gross Hematuria  /BPH  - c/w IV hydration , monitor urine output   - get kidney/ bladder US noted   - passed TOV 10/19, now retaining again, mccormick placed again 10/23  - c/w Flomax (once has NGT)   - PSA level wnl    -  followed up on 10/25 and flushed Mccormick, will flush today again   - Called urology(10/30) for followup for hematuria : no recommendations    #ARNULFO  - suspect pre-renal   - resolved after hydration     # Isolated elevated ALP  - follow up outpatient     DVT ppx   Dispo: TBD

## 2023-11-02 NOTE — PROGRESS NOTE ADULT - SUBJECTIVE AND OBJECTIVE BOX
Electrophysiology Brief Post-Op Note    I have personally seen and examined the patient.  I agree with the history and physical which I have reviewed and noted any changes below.  11-02-23 @ 16:37    PRE-OP DIAGNOSIS:Cryptogenic CVA    POST-OP DIAGNOSIS:  Cryptogenic CVA    PROCEDURE: Loop Implant    Physician: Dr. Lugo  Assistant: SKYLER Justice  Referring: none    ESTIMATED BLOOD LOSS:  2  mL    ANESTHESIA TYPE:  [  ]General Anesthesia  [  ] Sedation  [X  ] Local/Regional    CONDITION  [  ] Critical  [  ] Serious  [  ]Fair  [ X ]Good    SPECIMENS REMOVED (IF APPLICABLE):  none    IMPLANTS (IF APPLICABLE)  Loop Recorder ([employer])    FINDINGS  PLAN OF CARE  - F/U 3-4 weeks  - May remove bandaid in 2 days  - May shower in 48 hours

## 2023-11-02 NOTE — PROGRESS NOTE ADULT - SUBJECTIVE AND OBJECTIVE BOX
Pt seen and examined at bedside.         VITAL SIGNS (Last 24 hrs):  T(C): 37 (11-02-23 @ 05:28), Max: 37 (11-02-23 @ 05:28)  HR: 85 (11-02-23 @ 05:28) (69 - 98)  BP: 160/75 (11-02-23 @ 05:28) (112/56 - 160/75)  RR: 18 (11-01-23 @ 20:04) (18 - 18)        I&O's Summary    01 Nov 2023 07:01  -  02 Nov 2023 07:00  -------------------------------------------------   IN: 0 mL / OUT: 800 mL / NET: -800 mL        PHYSICAL EXAM:  GENERAL: NAD   HEAD:  Atraumatic, Normocephalic  EYES:  conjunctiva and sclera clear  NECK: Supple, No JVD  CHEST/LUNG: Clear to auscultation bilaterally; No wheeze  HEART: Regular rate and rhythm; No murmurs, rubs, or gallops  ABDOMEN: Soft, Nontender, Nondistended; Bowel sounds present  EXTREMITIES:  2+ Peripheral Pulses, No clubbing, cyanosis, or edema  PSYCH: AAOx3  NEUROLOGY: non-focal  SKIN: No rashes or lesions    Labs Reviewed       CBC Full  -  ( 02 Nov 2023 08:53 )  WBC Count : 7.74 K/uL  Hemoglobin : 10.4 g/dL  Hematocrit : 32.0 %  Platelet Count - Automated : 333 K/uL  Mean Cell Volume : 88.9 fL  Mean Cell Hemoglobin : 28.9 pg  Mean Cell Hemoglobin Concentration : 32.5 g/dL  Auto Neutrophil # : x  Auto Lymphocyte # : x  Auto Monocyte # : x  Auto Eosinophil # : x  Auto Basophil # : x  Auto Neutrophil % : x  Auto Lymphocyte % : x  Auto Monocyte % : x  Auto Eosinophil % : x  Auto Basophil % : x    BMP:    11-02 @ 08:53    Blood Urea Nitrogen - 9  Calcium - 8.6  Carbond Dioxide - 29  Chloride - 103  Creatinine - 0.6  Glucose - 101  Potassium - 3.8  Sodium - 142      Hemoglobin A1c -     Urine Culture:            MEDICATIONS  (STANDING):  aspirin enteric coated 81 milliGRAM(s) Oral daily  atorvastatin 40 milliGRAM(s) Oral at bedtime  chlorhexidine 2% Cloths 1 Application(s) Topical <User Schedule>  cloNIDine Patch 0.1 mG/24Hr(s) 1 patch Transdermal every 7 days  clopidogrel Tablet 75 milliGRAM(s) Oral daily  enoxaparin Injectable 40 milliGRAM(s) SubCutaneous every 24 hours  lisinopril 10 milliGRAM(s) Oral daily  pantoprazole    Tablet 40 milliGRAM(s) Oral before breakfast  tamsulosin 0.4 milliGRAM(s) Oral at bedtime    MEDICATIONS  (PRN):  OLANZapine 2.5 milliGRAM(s) Oral every 8 hours PRN agitation

## 2023-11-03 NOTE — PROGRESS NOTE ADULT - ASSESSMENT
Imp: Acute  embolic CVA/ AMS/ suspected metabolic encephalopathy/ vascular dementia / unable to care for himself at home   plan: continue bedside therapy as tolerated          d/c to rehab facility in [place] of family choice when medically cleared

## 2023-11-03 NOTE — DISCHARGE NOTE PROVIDER - NSDCCPCAREPLAN_GEN_ALL_CORE_FT
PRINCIPAL DISCHARGE DIAGNOSIS  Diagnosis: Weakness  Assessment and Plan of Treatment: You presented for weakness and agitation. MRI head showed acute strokes. Neuro evaluation was done and they recommend you to take aspirin and clopidorgel for 21 days. Loop recorder was placed to monitor your heart rhythm.   Stroke is the term doctors use when a part of the brain is damaged because of a problem with blood flow. Strokes can happen when:  -An artery going to the brain gets clogged or closes off, and part of the brain goes without blood for too long.  -An artery breaks open and starts bleeding into or around the brain.  The symptoms of a stroke usually start suddenly.  One way to help remember stroke symptoms is to think of the words "BE FAST"   -Balance – Is the person having trouble standing or walking?  -Eyes – Is the person having trouble with their vision?  -Face – Does the person's face look uneven or droop on 1 side?  -Arm – Does the person have weakness or numbness in 1 or both arms? Does 1 arm drift down if they try to hold both arms out?  -Speech – Is the person having trouble speaking? Does their speech sound strange?  -Time – If you notice any of these stroke signs, call for an ambulance (in the [place] and [place], call [phone]). You need to act FAST.   A stroke caused by bleeding in the brain can also cause a sudden, severe headache.  Can strokes be prevented?  Many strokes can be prevented, though not all. You can greatly lower your chance of having a stroke if you:  ?Take your medicines exactly as directed. Medicines that are especially important to help prevent a stroke include:  •Blood pressure medicines  •Medicines called statins, which lower cholesterol  •Medicines to prevent blood clots, like aspirin or blood thinners  •Medicines that help keep your blood sugar as close to normal as possible (if you have diabetes)  Make lifestyle changes:  •Stop smoking  •Get regular exercise   •Try to lose weight, if you are overweight.  •Eat a healthy diet     PRINCIPAL DISCHARGE DIAGNOSIS  Diagnosis: Weakness  Assessment and Plan of Treatment: You presented for confusion and lethargy. MRI head showed acute punctate strokes. Neuro evaluation was done and they recommend you to take aspirin and clopidorgel for 21 days in total. You have taken it for 12 days already so please continue for another 9 days and then TAKE aspirin only and DISCONTINUE taking Plavix.  Follow up with a neurologist.   Loop recorder was placed to monitor your heart rhythm.         SECONDARY DISCHARGE DIAGNOSES  Diagnosis: Enlarged prostate with urinary retention  Assessment and Plan of Treatment: You have a Han catheter now. Please follow up with a urologist for a trial of void.    Diagnosis: Pneumonia, aspiration  Assessment and Plan of Treatment: You were treated for pneumonia with 7 days of Levaquin.

## 2023-11-03 NOTE — PROGRESS NOTE ADULT - ASSESSMENT
72y/o male with no PMH brought in by niece for concerns of increasing weakness, agitation and inability to take care of himself.       #Acute embolic CVA  #Suspected metabolic encephalopathy 2/2 PNA - resolved   - psych eval noted - Zyprexa PRN   - Brain MRI: small scattered acute infarcts in the left frontal and parietal subcortical white matter.  - CTA neck: Moderate stenosis (50%) in the proximal left cervical ICA resulting in moderate. Mild focal stenosis at the origin of the right vertebral artery.   - TTE noted   - pt needs  DAPT x 21 days then monotherapy with aspirin afterward   - c/w statin   - GI PPx   - s/p ILR   - Neuro f/u outpatient      #LLL PNA   - s/p 7 days of Levaquin     #Dysphagia/ Poor appetite   - s/p barium swallow on 10/26, cleared by  speech and swallow for po diet   - passed calorie count   - aspiration precautions     #Possible UTI   - pt completed the course of  Ampicillin for UTI      #Acute Urinary retention /Gross Hematuria  /BPH  - kidney/ bladder US noted   - c/w Flomax  - PSA level wnl    - hematuria resolved     #ARNULFO  - suspect pre-renal   - resolved after hydration     #Isolated elevated ALP  - follow up outpatient     DNR/DNI   DVT ppx     Pending:  STR placement   Plan of care d/w leo Marianoen over the phone in length 11/2   Dispo: STR

## 2023-11-03 NOTE — DISCHARGE NOTE PROVIDER - ATTENDING DISCHARGE PHYSICAL EXAMINATION:
PHYSICAL EXAM:  GENERAL: NAD   HEAD:  Atraumatic, Normocephalic  EYES:   conjunctiva and sclera clear  NECK: Supple, No JVD  CHEST/LUNG: Clear to auscultation bilaterally; No wheeze  HEART: Regular rate and rhythm; No murmurs, rubs, or gallops  ABDOMEN: Soft, Nontender, Nondistended; Bowel sounds present  EXTREMITIES:  2+ Peripheral Pulses, No clubbing, cyanosis, or edema  SKIN: No rashes or lesions

## 2023-11-03 NOTE — DISCHARGE NOTE PROVIDER - PROVIDER TOKENS
PROVIDER:[TOKEN:[[id number]],FOLLOWUP:[2 weeks]],PROVIDER:[TOKEN:[[id number]],FOLLOWUP:[1 week]] PROVIDER:[TOKEN:[[id number]],FOLLOWUP:[2 weeks]],PROVIDER:[TOKEN:[[id number]],FOLLOWUP:[1 week]],PROVIDER:[TOKEN:[[id number]],FOLLOWUP:[2 weeks]]

## 2023-11-03 NOTE — OCCUPATIONAL THERAPY INITIAL EVALUATION ADULT - PERTINENT HX OF CURRENT PROBLEM, REHAB EVAL
74 y/o male with no PMH brought in by niece for concerns of increasing weakness, agitation and inability to take care of himself. Pt found to have Acute  embolic CVA/ AMS/ suspected metabolic encephalopathy/ vascular dementia. repeat HCT is negative for acute pathology. MRI shows small scattered acute infarcts in the left frontal and parietal subcortical white matter.

## 2023-11-03 NOTE — DISCHARGE NOTE PROVIDER - CARE PROVIDER_API CALL
Davey Guerra  Neurology  [address] [place] [address], [address]  [place] [address]  Phone: [phone]  Fax: [phone]  Follow Up Time: 2 weeks    Brooklyn Whitman  Internal Medicine  [address] [place] [address]  Phone: [phone]  Fax: [phone]  Follow Up Time: 1 week   Davey Guerra  Neurology  [address] [place] [address], [address]  [place] [address]  Phone: [phone]  Fax: [phone]  Follow Up Time: 2 weeks    Brooklyn Whitman  Internal Medicine  [address] [place] [address]  Phone: [phone]  Fax: [phone]  Follow Up Time: 1 week    Roger Medley  Urology  [address] [place] [address]  Phone: [phone]  Fax: [phone]  Follow Up Time: 2 weeks

## 2023-11-03 NOTE — OCCUPATIONAL THERAPY INITIAL EVALUATION ADULT - GENERAL OBSERVATIONS, REHAB EVAL
Pt encountered semi reclined in bed. +IV locked. +mccormick. Family at the bedside. Pt presents with generalized weakness and fatigue impacting functional performance at this time. Pt noted to be soiled upon standing. Pt unable to maintain standing position long enough to complete thorough hygiene with assistance of PCA. Pt returned to bed to complete remainder of hygiene care. RN aware .

## 2023-11-03 NOTE — DISCHARGE NOTE PROVIDER - NSDCMRMEDTOKEN_GEN_ALL_CORE_FT
aspirin 81 mg oral delayed release tablet: 1 tab(s) orally once a day  atorvastatin 40 mg oral tablet: 1 tab(s) orally once a day (at bedtime)  clopidogrel 75 mg oral tablet: 1 tab(s) orally once a day  lisinopril 10 mg oral tablet: 1 tab(s) orally once a day  tamsulosin 0.4 mg oral capsule: 1 cap(s) orally once a day (at bedtime)   aspirin 81 mg oral delayed release tablet: 1 tab(s) orally once a day  atorvastatin 40 mg oral tablet: 1 tab(s) orally once a day (at bedtime)  clopidogrel 75 mg oral tablet: 1 tab(s) orally once a day  lisinopril 10 mg oral tablet: 1 tab(s) orally once a day  OLANZapine 2.5 mg oral tablet: 1 tab(s) orally every 8 hours As needed agitation  pantoprazole 40 mg oral delayed release tablet: 1 tab(s) orally once a day (before a meal)  tamsulosin 0.4 mg oral capsule: 1 cap(s) orally once a day (at bedtime)

## 2023-11-03 NOTE — OCCUPATIONAL THERAPY INITIAL EVALUATION ADULT - ADL RETRAINING, OT EVAL
Pt will increase LB dressing to MIN A by discharge to increase independence and return to life roles

## 2023-11-03 NOTE — PROGRESS NOTE ADULT - SUBJECTIVE AND OBJECTIVE BOX
Pt seen and examined at bedside.  Remains alert, awake, eating.     VITAL SIGNS (Last 24 hrs):  T(C): 37 (11-03-23 @ 13:28), Max: 37.2 (11-02-23 @ 20:57)  HR: 86 (11-03-23 @ 13:28) (86 - 99)  BP: 114/57 (11-03-23 @ 13:28) (114/57 - 172/80)  RR: 18 (11-03-23 @ 13:28) (18 - 18)  SpO2: --  Wt(kg): --  Daily     Daily     I&O's Summary    02 Nov 2023 07:01  -  03 Nov 2023 07:00  --------------------------------------------------------  IN: 0 mL / OUT: 1600 mL / NET: -1600 mL        PHYSICAL EXAM:  GENERAL: NAD   HEAD:  Atraumatic, Normocephalic  EYES:  conjunctiva and sclera clear  NECK: Supple, No JVD  CHEST/LUNG: Clear to auscultation bilaterally; No wheeze  HEART: Regular rate and rhythm; No murmurs, rubs, or gallops  ABDOMEN: Soft, Nontender, Nondistended; Bowel sounds present  EXTREMITIES:  2+ Peripheral Pulses, No clubbing, cyanosis, or edema  NEUROLOGY: awake, alert, moving all extremities   SKIN: No rashes or lesions    Labs Reviewed       CBC Full  -  ( 03 Nov 2023 06:56 )  WBC Count : 9.97 K/uL  Hemoglobin : 10.6 g/dL  Hematocrit : 32.9 %  Platelet Count - Automated : 372 K/uL  Mean Cell Volume : 89.2 fL  Mean Cell Hemoglobin : 28.7 pg  Mean Cell Hemoglobin Concentration : 32.2 g/dL  Auto Neutrophil # : x  Auto Lymphocyte # : x  Auto Monocyte # : x  Auto Eosinophil # : x  Auto Basophil # : x  Auto Neutrophil % : x  Auto Lymphocyte % : x  Auto Monocyte % : x  Auto Eosinophil % : x  Auto Basophil % : x    BMP:    11-03 @ 06:56    Blood Urea Nitrogen - 11  Calcium - 8.7  Carbond Dioxide - 28  Chloride - 103  Creatinine - <0.5  Glucose - 104  Potassium - 4.2  Sodium - 140      Hemoglobin A1c -     Urine Culture:            MEDICATIONS  (STANDING):  aspirin enteric coated 81 milliGRAM(s) Oral daily  atorvastatin 40 milliGRAM(s) Oral at bedtime  chlorhexidine 2% Cloths 1 Application(s) Topical <User Schedule>  clopidogrel Tablet 75 milliGRAM(s) Oral daily  enoxaparin Injectable 40 milliGRAM(s) SubCutaneous every 24 hours  lisinopril 10 milliGRAM(s) Oral daily  pantoprazole    Tablet 40 milliGRAM(s) Oral before breakfast  tamsulosin 0.4 milliGRAM(s) Oral at bedtime    MEDICATIONS  (PRN):  OLANZapine 2.5 milliGRAM(s) Oral every 8 hours PRN agitation

## 2023-11-03 NOTE — DISCHARGE NOTE PROVIDER - DETAILS OF MALNUTRITION DIAGNOSIS/DIAGNOSES
This patient has been assessed with a concern for Malnutrition and was treated during this hospitalization for the following Nutrition diagnosis/diagnoses:     -  10/18/2023: Severe protein-calorie malnutrition

## 2023-11-03 NOTE — PROGRESS NOTE ADULT - SUBJECTIVE AND OBJECTIVE BOX
Patient is a 73y old  Male who presents with a chief complaint of Weakness, confusion, inability to ambulate       HPI:  The patient is a 73-year-old male with no PMH not on any home meds and history of service in [place] during [place] war, lives at home with his sister and is brought in by niece for concerns of increasing weakness, agitation and inability to take care of himself. Ramsey also states his living environment is not safe for him at home and patient endorses being unable to walk and take care of himself. At time of encounter patient has grossly normal UE and LE strength, his speech is slurred, and he is slightly confused to questioning. Exam is otherwise normal, neuro-imaging was done which revealed no acute process in addition to age-indeterminate ischemic changes. The patient is admitted to medicine for weakness, confusion, and inability to care for self.      #Acute  embolic CVA/ AMS/ suspected metabolic encephalopathy/ vascular dementia   - repeat HCT is negative for acute pathology.  - Brain MRI: small scattered acute infarcts in the left frontal and parietal subcortical white matter.  - CTA neck: Moderate stenosis (50%) in the proximal left cervical ICA resulting in moderate. Mild focal stenosis at the origin of the right vertebral artery.   - c/w remote telemetry - no events so far   - TTE noted   - c/w neuro checks Q 6 hours  - pt  needs  DAPT x 21 days , on po diet now, takes med   - supportive care   - EP consulted for loop recorder placement as a part of embolic stroke workup- plan for today    #LLL PNA   -  started on Levofloxacin on 10/25  - supportive care, prevent fall and aspiration     # Dysphagia/ Poor appetite   - s/p barium swallow on 10/26, cleared by  speech and swallow for po diet   - passed calorie count  - aspiration precautions     #Vascular  Dementia with behavioural disturbances   - Reversible causes of dementia ruled out - CT Head negative, b12, folate, TSH, VDRL   - MRI c spine negative   - MRI brain showed acute CVA   - c/w Zyprexa PRN for agitation   - pt completed the course of  Ampicillin for UTI        PHYSICAL EXAM    Vital Signs Last 24 Hrs  T(C): 37 (03 Nov 2023 13:28), Max: 37.2 (02 Nov 2023 20:57)  T(F): 98.6 (03 Nov 2023 13:28), Max: 99 (02 Nov 2023 20:57)  HR: 86 (03 Nov 2023 13:28) (86 - 99)  BP: 114/57 (03 Nov 2023 13:28) (114/57 - 172/80)  BP(mean): --  RR: 18 (03 Nov 2023 13:28) (18 - 18)  SpO2: --        Constitutional - NAD  Chest - CTA  Cardiovascular - S1S2+  Abdomen -  Soft  Extremities -  No calf tenderness   Function : transfer mod A / ambulate 10'RW mod A                upper body dressing min A and lower body dressing mod A     aspirin enteric coated 81 milliGRAM(s) Oral daily  atorvastatin 40 milliGRAM(s) Oral at bedtime  chlorhexidine 2% Cloths 1 Application(s) Topical <User Schedule>  clopidogrel Tablet 75 milliGRAM(s) Oral daily  enoxaparin Injectable 40 milliGRAM(s) SubCutaneous every 24 hours  lisinopril 10 milliGRAM(s) Oral daily  OLANZapine 2.5 milliGRAM(s) Oral every 8 hours PRN  pantoprazole    Tablet 40 milliGRAM(s) Oral before breakfast  tamsulosin 0.4 milliGRAM(s) Oral at bedtime      RECENT LABS/IMAGING                        10.6   9.97  )-----------( 372      ( 03 Nov 2023 06:56 )             32.9     11-03    140  |  103  |  11  ----------------------------<  104<H>  4.2   |  28  |  <0.5<L>    Ca    8.7      03 Nov 2023 06:56    TPro  5.4<L>  /  Alb  3.1<L>  /  TBili  0.3  /  DBili  x   /  AST  13  /  ALT  12  /  AlkPhos  94  11-03      Urinalysis Basic - ( 03 Nov 2023 06:56 )    Color: x / Appearance: x / SG: x / pH: x  Gluc: 104 mg/dL / Ketone: x  / Bili: x / Urobili: x   Blood: x / Protein: x / Nitrite: x   Leuk Esterase: x / RBC: x / WBC x   Sq Epi: x / Non Sq Epi: x / Bacteria: x

## 2023-11-03 NOTE — DISCHARGE NOTE PROVIDER - HOSPITAL COURSE
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 Ramsey also states his living environment is not safe for him at home and patient endorses being unable to walk and take care of himself. At time of encounter patient has grossly normal UE and LE strength, his speech is slurred, and he is slightly confused to questioning. Exam is otherwise normal, neuro-imaging was done which revealed no acute process in addition to age-indeterminate ischemic changes. The patient is admitted to medicine for weakness, confusion, and inability to care for self.     #Acute  embolic CVA/ AMS/ suspected metabolic encephalopathy/ vascular dementia   - repeat HCT is negative for acute pathology.  - Brain MRI: small scattered acute infarcts in the left frontal and parietal subcortical white matter.  - CTA neck: Moderate stenosis (50%) in the proximal left cervical ICA resulting in moderate. Mild focal stenosis at the origin of the right vertebral artery.   - c/w remote telemetry - no events so far   - TTE noted   - c/w neuro checks Q 6 hours  - pt  needs  DAPT x 21 days , on po diet now, takes med   - supportive care   - Loop recorder Placed as a part of embolic stroke workup    #LLL PNA   - S/P levofloxacin  - supportive care, prevent fall and aspiration     # Dysphagia/ Poor appetite   - s/p barium swallow on 10/26, cleared by  speech and swallow for po diet   - passed calorie count  - aspiration precautions     #Vascular  Dementia with behavioural disturbances   - Reversible causes of dementia ruled out - CT Head negative, b12, folate, TSH, VDRL   - MRI c spine negative   - MRI brain showed acute CVA   - c/w Zyprexa PRN for agitation   - pt completed the course of  Ampicillin for UTI    - consulted by  Psych, recommendations noted     #Urinary retention /Gross Hematuria  /BPH  - c/w IV hydration , monitor urine output   - get kidney/ bladder US noted   - passed TOV 10/19, now retaining again, mccormick placed again 10/23  - c/w Flomax (once has NGT)   - PSA level wnl    -  followed up on 10/25 and flushed Mccormick, will flush today again   - Called urology(10/30) for followup for hematuria : no recommendations    #ARNULFO  - suspect pre-renal   - resolved after hydration     Patient medically clear and stable for discharge.

## 2023-11-03 NOTE — DISCHARGE NOTE PROVIDER - NSDCFUSCHEDAPPT_GEN_ALL_CORE_FT
[facility]  [facility] [address] [facility]  Scheduled Appointment: 12/04/2023

## 2023-11-04 NOTE — PROGRESS NOTE ADULT - SUBJECTIVE AND OBJECTIVE BOX
VITAL SIGNS (Last 24 hrs):  T(C): 36.6 (11-04-23 @ 09:00), Max: 36.7 (11-03-23 @ 21:48)  HR: 64 (11-04-23 @ 09:00) (64 - 83)  BP: 114/53 (11-04-23 @ 09:00) (114/53 - 137/64)  RR: 16 (11-04-23 @ 09:00) (16 - 18)  SpO2: 96% (11-04-23 @ 09:00) (96% - 96%)  Wt(kg): --  Daily     Daily     I&O's Summary    03 Nov 2023 07:01  -  04 Nov 2023 07:00  --------------------------------------------------------  IN: 0 mL / OUT: 700 mL / NET: -700 mL            CBC Full  -  ( 03 Nov 2023 06:56 )  WBC Count : 9.97 K/uL  Hemoglobin : 10.6 g/dL  Hematocrit : 32.9 %  Platelet Count - Automated : 372 K/uL  Mean Cell Volume : 89.2 fL  Mean Cell Hemoglobin : 28.7 pg  Mean Cell Hemoglobin Concentration : 32.2 g/dL  Auto Neutrophil # : x  Auto Lymphocyte # : x  Auto Monocyte # : x  Auto Eosinophil # : x  Auto Basophil # : x  Auto Neutrophil % : x  Auto Lymphocyte % : x  Auto Monocyte % : x  Auto Eosinophil % : x  Auto Basophil % : x    BMP:    11-03 @ 06:56    Blood Urea Nitrogen - 11  Calcium - 8.7  Carbond Dioxide - 28  Chloride - 103  Creatinine - <0.5  Glucose - 104  Potassium - 4.2  Sodium - 140            MEDICATIONS  (STANDING):  aspirin enteric coated 81 milliGRAM(s) Oral daily  atorvastatin 40 milliGRAM(s) Oral at bedtime  chlorhexidine 2% Cloths 1 Application(s) Topical <User Schedule>  clopidogrel Tablet 75 milliGRAM(s) Oral daily  enoxaparin Injectable 40 milliGRAM(s) SubCutaneous every 24 hours  lisinopril 10 milliGRAM(s) Oral daily  pantoprazole    Tablet 40 milliGRAM(s) Oral before breakfast  tamsulosin 0.4 milliGRAM(s) Oral at bedtime    MEDICATIONS  (PRN):  OLANZapine 2.5 milliGRAM(s) Oral every 8 hours PRN agitation

## 2023-11-04 NOTE — PROGRESS NOTE ADULT - ASSESSMENT
74y/o male with no PMH brought in by niece for concerns of increasing weakness, agitation and inability to take care of himself.       #Acute embolic CVA  #Suspected metabolic encephalopathy 2/2 PNA - resolved   - psych eval noted - Zyprexa PRN   - Brain MRI: small scattered acute infarcts in the left frontal and parietal subcortical white matter.  - CTA neck: Moderate stenosis (50%) in the proximal left cervical ICA resulting in moderate. Mild focal stenosis at the origin of the right vertebral artery.   - TTE noted   - pt needs  DAPT x 21 days then monotherapy with aspirin afterward   - c/w statin   - GI PPx   - s/p ILR   - Neuro f/u outpatient      #LLL PNA   - s/p 7 days of Levaquin     #Dysphagia/ Poor appetite   - s/p barium swallow on 10/26, cleared by  speech and swallow for po diet   - passed calorie count   - aspiration precautions     #Possible UTI   - pt completed the course of  Ampicillin for UTI      #Acute Urinary retention /Gross Hematuria  /BPH  - kidney/ bladder US noted   - c/w Flomax  - PSA level wnl    - hematuria resolved     #ARNULFO  - suspect pre-renal   - resolved after hydration     #Isolated elevated ALP  - follow up outpatient     DNR/DNI   DVT ppx     Pending:  STR placement   Plan of care d/w leo Marianoen over the phone in length 11/2   Dispo: STR

## 2023-11-05 NOTE — PROGRESS NOTE ADULT - ASSESSMENT
72y/o male with no PMH brought in by niece for concerns of increasing weakness, agitation and inability to take care of himself.       #Acute embolic CVA  #Suspected metabolic encephalopathy 2/2 PNA - resolved   - psych eval noted - Zyprexa PRN   - Brain MRI: small scattered acute infarcts in the left frontal and parietal subcortical white matter.  - CTA neck: Moderate stenosis (50%) in the proximal left cervical ICA resulting in moderate. Mild focal stenosis at the origin of the right vertebral artery.   - TTE noted   - pt needs  DAPT x 21 days then monotherapy with aspirin afterward   - c/w statin   - GI PPx   - s/p ILR   - Neuro f/u outpatient      #LLL PNA   - s/p 7 days of Levaquin     #Dysphagia/ Poor appetite   - s/p barium swallow on 10/26, cleared by  speech and swallow for po diet   - passed calorie count   - aspiration precautions     #Possible UTI   - pt completed the course of  Ampicillin for UTI      #Acute Urinary retention /Gross Hematuria  /BPH  - kidney/ bladder US noted   - c/w Flomax  - PSA level wnl    - hematuria resolved   - TOV today    #ARNULFO  - suspect pre-renal   - resolved after hydration     #Isolated elevated ALP  - follow up outpatient     DNR/DNI   DVT ppx     Pending:  STR placement, TOV today   Plan of care d/w leo Evans over the phone in length 11/2   Dispo: STR

## 2023-11-05 NOTE — PROGRESS NOTE ADULT - SUBJECTIVE AND OBJECTIVE BOX
Pt seen and examined at bedside. Awake, alert, eating independently. Denies any complaints. No SOB, CP.         VITAL SIGNS (Last 24 hrs):  T(C): 36.4 (11-05-23 @ 13:38), Max: 36.4 (11-05-23 @ 13:38)  HR: 81 (11-05-23 @ 13:38) (81 - 93)  BP: 128/60 (11-05-23 @ 13:38) (128/60 - 144/66)  RR: 18 (11-05-23 @ 13:38) (18 - 18)  SpO2: 96% (11-05-23 @ 13:38) (96% - 96%)  Wt(kg): --  Daily     Daily     I&O's Summary      PHYSICAL EXAM:  GENERAL: NAD  HEAD:  Atraumatic, Normocephalic  EYES:   conjunctiva and sclera clear  NECK: Supple, No JVD  CHEST/LUNG: Clear to auscultation bilaterally; No wheeze  HEART: Regular rate and rhythm; No murmurs, rubs, or gallops  ABDOMEN: Soft, Nontender, Nondistended; Bowel sounds present  EXTREMITIES:  2+ Peripheral Pulses, No clubbing, cyanosis, or edema  SKIN: No rashes or lesions    Labs Reviewed  Spoke to patient in regards to abnormal labs.    CBC Full  -  ( 03 Nov 2023 06:56 )  WBC Count : 9.97 K/uL  Hemoglobin : 10.6 g/dL  Hematocrit : 32.9 %  Platelet Count - Automated : 372 K/uL  Mean Cell Volume : 89.2 fL  Mean Cell Hemoglobin : 28.7 pg  Mean Cell Hemoglobin Concentration : 32.2 g/dL  Auto Neutrophil # : x  Auto Lymphocyte # : x  Auto Monocyte # : x  Auto Eosinophil # : x  Auto Basophil # : x  Auto Neutrophil % : x  Auto Lymphocyte % : x  Auto Monocyte % : x  Auto Eosinophil % : x  Auto Basophil % : x    BMP:    11-03 @ 06:56    Blood Urea Nitrogen - 11  Calcium - 8.7  Carbond Dioxide - 28  Chloride - 103  Creatinine - <0.5  Glucose - 104  Potassium - 4.2  Sodium - 140               MEDICATIONS  (STANDING):  aspirin enteric coated 81 milliGRAM(s) Oral daily  atorvastatin 40 milliGRAM(s) Oral at bedtime  chlorhexidine 2% Cloths 1 Application(s) Topical <User Schedule>  clopidogrel Tablet 75 milliGRAM(s) Oral daily  enoxaparin Injectable 40 milliGRAM(s) SubCutaneous every 24 hours  lisinopril 10 milliGRAM(s) Oral daily  pantoprazole    Tablet 40 milliGRAM(s) Oral before breakfast  tamsulosin 0.4 milliGRAM(s) Oral at bedtime    MEDICATIONS  (PRN):  OLANZapine 2.5 milliGRAM(s) Oral every 8 hours PRN agitation

## 2023-11-06 NOTE — PROGRESS NOTE ADULT - ATTENDING COMMENTS
72y/o male with no PMH brought in by niece for concerns of increasing weakness, agitation and inability to take care of himself.     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   #Rule out metabolic encephalopathy   - Reversible causes of dementia ruled out - CT Head negative, b12, folate, TSH, VDRL   - Start Zyprexa at bedtime   - start Unasyn for possible UTI and monitor for improvement of mentation   - psych consulted for behavioural management     #Hypertension  - due to agitation  - c/w lisinopril and clonidine patch     #Urinary retention  #Gross Hematuria   #BPH  - c/w Han, placed by urology   - c/w flomax  - PSA level wnl   - Urology f/u     #Isolated elevated ALP: follow up outpatient     dvt ppx     Pending: placement, psych eval, AMS improvement   Plan of care d/w betteece   Dispo: STR
74y/o male with no PMH brought in by niece for concerns of increasing weakness, agitation and inability to take care of himself.       #Acute embolic CVA  #Suspected metabolic encephalopathy 2/2 PNA - resolved   - psych eval noted - Zyprexa PRN   - Brain MRI: small scattered acute infarcts in the left frontal and parietal subcortical white matter.  - CTA neck: Moderate stenosis (50%) in the proximal left cervical ICA resulting in moderate. Mild focal stenosis at the origin of the right vertebral artery.   - TTE noted   - pt needs  DAPT x 21 days then monotherapy with aspirin afterward   - c/w statin   - GI PPx   - s/p ILR   - Neuro f/u outpatient      #LLL PNA   - s/p 7 days of Levaquin     #Dysphagia/ Poor appetite   - s/p barium swallow on 10/26, cleared by  speech and swallow for po diet   - passed calorie count   - aspiration precautions     #Possible Enterococcal UTI   - pt completed the course of  Ampicillin for UTI      #Acute Urinary retention /Gross Hematuria  /BPH  - kidney/ bladder US noted   - c/w Flomax  - PSA level wnl    - hematuria resolved   - failed TOV x 2     #ARNULFO  - suspect pre-renal   - resolved after hydration     #Isolated elevated ALP  - follow up outpatient     DNR/DNI   DVT ppx       Pending:  STR placement
Patient noted to be hypophonic with moderate-to-severe dysarthria on exam. Small punctate infarcts noted in the left frontal and parietal lobes likely embolic in etiology. Symptoms not consistent with localization of infarcts.    - Advise DAPT x 21 days with ASA monotherapy thereafter, along with high-intensity statin, once reliable PO access established  - Cardioembolic workup  - PT/OT/ST  - Please call back PRN
72y/o male with no PMH brought in by niece for concerns of increasing weakness, agitation and inability to take care of himself.       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   #Rule out metabolic encephalopathy   #Possible UTI   - Reversible causes of dementia ruled out - CT Head negative, b12, folate, TSH, VDRL   - MRI c spine negative   - MRI brain showed acute CVA   - c/w Zyprexa at bedtime   - c/w Ampicillin for UTI x 5 days   - Psych appreciated   - neuro consult noted      #Acute CVA   - Brain MRI: small scattered acute infarcts in the left frontal and parietal subcortical white matter.  - CTA neck: Moderate stenosis (50%) in the proximal left cervical ICA resulting in moderate. Mild focal stenosis at the origin of the right vertebral artery.   - c/w remote telemetry - no events thus far   - f/u TTE - done, noted   - c/w neuro checks q6h   - f/u A1c and lipid panel   - neuro f/u   - c/w ASA, statin via NGT if family agreeable   - SLP eval - failed, NPO for now, spoke to niisauro Corado [phone], she will speak to aunt regarding NGT   - Palliative consult     #Anemia   - downtrending today  - elevated BUN   - no signs of bleeding, had nonbloody BM yesterday   - trend for now     #Leukocytosis - resolved   - afebrile   - CXR negative, repeat urine culture negative   - on ampicillin for enterococcal UTI     #ARNULFO, suspect pre-renal   - resolved after hydration     #Urinary retention   #Gross Hematuria - resolved   #BPH  - passed TOV 10/19, now retaining again   - c/w Flomax (once has NGT)   - PSA level wnl      #Isolated elevated ALP: follow up outpatient     DVT ppx     Pending: stroke w/u, anemia, NPO for now, palliative, possible NGT   Plan of care d/w Dr. Corado (leo in [place]) in length over the phone   Dispo: STR
72y/o male with no PMH brought in by niece for concerns of increasing weakness, agitation and inability to take care of himself.     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   #Rule out metabolic encephalopathy   - Reversible causes of dementia ruled out - CT Head negative, b12, folate, TSH, VDRL   - c/w Zyprexa at bedtime   - on Unasyn for possible UTI and monitor for improvement of mentation   - psych consulted for behavioural management     #Hypertension  - due to agitation  - c/w lisinopril and clonidine patch     #Urinary retention  #Gross Hematuria - resolved   #BPH  - c/w Han, placed by urology   - c/w flomax  - PSA level wnl   - Urology f/u   - TOV once ambulating     #Isolated elevated ALP: follow up outpatient     dvt ppx     Pending: Placement, urine culture sensitivities    Plan of care d/w niisauro   Dispo: STR
No concerning findings noted on CTA/CTP obtained as part of stroke code. Patient does have pulmonary infiltrates and is febrile. Presume symptoms related to toxic/metabolic etiology as previously noted punctate infarcts would not explain them.    - Advise DAPT once feasible  - Infectious/metabolic workup  - Please call back PRN if further issues

## 2023-11-06 NOTE — PROGRESS NOTE ADULT - SUBJECTIVE AND OBJECTIVE BOX
24H events:    Patient is a 73y old Male who presents with a chief complaint of Weakness, confusion, inability to ambulate (05 Nov 2023 14:18)    Primary diagnosis of Weakness    Today is hospital day 21d. This morning patient was seen and examined at bedside, resting comfortably in bed.  Patient is retaining with bladder scan showing 300cc.   No acute or major events overnight.    Code Status: Full code    PAST MEDICAL & SURGICAL HISTORY  Hypertension, unspecified type    No significant past surgical history      SOCIAL HISTORY:  Social History:      ALLERGIES:  No Known Allergies    MEDICATIONS:  STANDING MEDICATIONS  aspirin enteric coated 81 milliGRAM(s) Oral daily  atorvastatin 40 milliGRAM(s) Oral at bedtime  chlorhexidine 2% Cloths 1 Application(s) Topical <User Schedule>  clopidogrel Tablet 75 milliGRAM(s) Oral daily  enoxaparin Injectable 40 milliGRAM(s) SubCutaneous every 24 hours  lisinopril 10 milliGRAM(s) Oral daily  pantoprazole    Tablet 40 milliGRAM(s) Oral before breakfast  tamsulosin 0.4 milliGRAM(s) Oral at bedtime    PRN MEDICATIONS  OLANZapine 2.5 milliGRAM(s) Oral every 8 hours PRN    VITALS:   T(F): 96.9  HR: 82  BP: 156/70  RR: 18  SpO2: 98%    PHYSICAL EXAM:   GENERAL: NAD, lying in bed comfortably  HEAD:  Atraumatic, Normocephalic  EYES: EOMI, sclera clear  CHEST/LUNG: Unlabored respirations  HEART: Regular rate and rhythm; No appreciable murmurs, rubs, or gallops  ABDOMEN: Soft, nontender, nondistended  EXTREMITIES: No clubbing, cyanosis, or edema  NERVOUS SYSTEM:  A&Ox3, no noted facial droop. Moving all extremities w/o difficulty.   SKIN: No rashes or lesions to b/l forearm, face.     ( - ) Indwelling Han Catheter:   Date insterted:    Reason (  ) Critical illness     (  ) urinary retention    (  ) Accurate Ins/Outs Monitoring     (  ) CMO patient    ( - ) Central Line:   Date inserted:  Location: (  ) Right IJ     (  ) Left IJ     (  ) Right Fem     (  ) Left Fem    ( - ) SPC        ( - ) pigtail       ( - ) PEG tube       ( - ) colostomy       ( - ) jejunostomy  ( - ) U-Dall    LABS:                        10.4   8.99  )-----------( 367      ( 06 Nov 2023 07:51 )             32.5     11-06    141  |  105  |  11  ----------------------------<  102<H>  4.6   |  32  |  0.8    Ca    8.7      06 Nov 2023 07:51        Urinalysis Basic - ( 06 Nov 2023 07:51 )    Color: x / Appearance: x / SG: x / pH: x  Gluc: 102 mg/dL / Ketone: x  / Bili: x / Urobili: x   Blood: x / Protein: x / Nitrite: x   Leuk Esterase: x / RBC: x / WBC x   Sq Epi: x / Non Sq Epi: x / Bacteria: x                   24H events:    Patient is a 73y old Male who presents with a chief complaint of Weakness, confusion, inability to ambulate (05 Nov 2023 14:18)    Primary diagnosis of Weakness    Today is hospital day 21d. This morning patient was seen and examined at bedside, resting comfortably in bed.  Patient is retaining with bladder scan showing 300cc.   No acute or major events overnight.    Code Status: DNR/DNI    PAST MEDICAL & SURGICAL HISTORY  Hypertension, unspecified type    No significant past surgical history      SOCIAL HISTORY:  Social History:      ALLERGIES:  No Known Allergies    MEDICATIONS:  STANDING MEDICATIONS  aspirin enteric coated 81 milliGRAM(s) Oral daily  atorvastatin 40 milliGRAM(s) Oral at bedtime  chlorhexidine 2% Cloths 1 Application(s) Topical <User Schedule>  clopidogrel Tablet 75 milliGRAM(s) Oral daily  enoxaparin Injectable 40 milliGRAM(s) SubCutaneous every 24 hours  lisinopril 10 milliGRAM(s) Oral daily  pantoprazole    Tablet 40 milliGRAM(s) Oral before breakfast  tamsulosin 0.4 milliGRAM(s) Oral at bedtime    PRN MEDICATIONS  OLANZapine 2.5 milliGRAM(s) Oral every 8 hours PRN    VITALS:   T(F): 96.9  HR: 82  BP: 156/70  RR: 18  SpO2: 98%    PHYSICAL EXAM:   GENERAL: NAD, lying in bed comfortably  HEAD:  Atraumatic, Normocephalic  EYES: EOMI, sclera clear  CHEST/LUNG: Unlabored respirations  HEART: Regular rate and rhythm; No appreciable murmurs, rubs, or gallops  ABDOMEN: Soft, nontender, nondistended  EXTREMITIES: No clubbing, cyanosis, or edema  NERVOUS SYSTEM:  A&Ox3, no noted facial droop. Moving all extremities w/o difficulty.   SKIN: No rashes or lesions to b/l forearm, face.     ( - ) Indwelling Han Catheter:   Date insterted:    Reason (  ) Critical illness     (  ) urinary retention    (  ) Accurate Ins/Outs Monitoring     (  ) CMO patient    ( - ) Central Line:   Date inserted:  Location: (  ) Right IJ     (  ) Left IJ     (  ) Right Fem     (  ) Left Fem    ( - ) SPC        ( - ) pigtail       ( - ) PEG tube       ( - ) colostomy       ( - ) jejunostomy  ( - ) U-Dall    LABS:                        10.4   8.99  )-----------( 367      ( 06 Nov 2023 07:51 )             32.5     11-06    141  |  105  |  11  ----------------------------<  102<H>  4.6   |  32  |  0.8    Ca    8.7      06 Nov 2023 07:51        Urinalysis Basic - ( 06 Nov 2023 07:51 )    Color: x / Appearance: x / SG: x / pH: x  Gluc: 102 mg/dL / Ketone: x  / Bili: x / Urobili: x   Blood: x / Protein: x / Nitrite: x   Leuk Esterase: x / RBC: x / WBC x   Sq Epi: x / Non Sq Epi: x / Bacteria: x

## 2023-11-06 NOTE — PROGRESS NOTE ADULT - ASSESSMENT
72y/o male with no PMH brought in by niece for concerns of increasing weakness, agitation and inability to take care of himself.     #Acute  embolic CVA/ AMS/ suspected metabolic encephalopathy/ vascular dementia   - repeat HCT is negative for acute pathology.  - Brain MRI: small scattered acute infarcts in the left frontal and parietal subcortical white matter.  - CTA neck: Moderate stenosis (50%) in the proximal left cervical ICA resulting in moderate. Mild focal stenosis at the origin of the right vertebral artery.   - c/w remote telemetry - no events so far   - TTE noted   - c/w neuro checks Q 6 hours  - pt  needs  DAPT x 21 days , on po diet now, takes med   - supportive care   - s/p loop recorder placement 11/2    #LLL PNA   -  started on Levofloxacin on 10/25  - supportive care, prevent fall and aspiration     # Dysphagia/ Poor appetite   - s/p barium swallow on 10/26, cleared by  speech and swallow for po diet   - passed calorie count  - aspiration precautions     #Vascular  Dementia with behavioural disturbances   - Reversible causes of dementia ruled out - CT Head negative, b12, folate, TSH, VDRL   - MRI c spine negative   - MRI brain showed acute CVA   - c/w Zyprexa PRN for agitation   - pt completed the course of  Ampicillin for UTI    - consulted by  Psych, recommendations noted     #Urinary retention /Gross Hematuria  /BPH  - c/w IV hydration , monitor urine output   - get kidney/ bladder US noted   - patient failed TOV, mccormick replaced 11/6    #ARNULFO  - suspect pre-renal   - resolved after hydration     # Isolated elevated ALP  - follow up outpatient     DVT ppx   Dispo: TBD

## 2023-11-07 NOTE — DISCHARGE NOTE NURSING/CASE MANAGEMENT/SOCIAL WORK - NSDCPEFALRISK_GEN_ALL_CORE
For information on Fall & Injury Prevention, visit: [email or web address] OR  [email or web address] OR  https://www.cdc.gov/steadi/patient.html

## 2023-11-07 NOTE — PROGRESS NOTE ADULT - REASON FOR ADMISSION
Weakness, confusion, inability to ambulate

## 2023-11-07 NOTE — PROGRESS NOTE ADULT - ASSESSMENT
74y/o male with no PMH brought in by niece for concerns of increasing weakness, agitation and inability to take care of himself.     #Acute  embolic CVA/ AMS/ suspected metabolic encephalopathy/ vascular dementia   - repeat HCT is negative for acute pathology.  - Brain MRI: small scattered acute infarcts in the left frontal and parietal subcortical white matter.  - CTA neck: Moderate stenosis (50%) in the proximal left cervical ICA resulting in moderate. Mild focal stenosis at the origin of the right vertebral artery.   - c/w remote telemetry - no events so far   - TTE noted   - c/w neuro checks Q 6 hours  - pt  needs  DAPT x 21 days , on po diet now, takes med   - supportive care   - s/p loop recorder placement 11/2    #LLL PNA   -  started on Levofloxacin on 10/25  - supportive care, prevent fall and aspiration     # Dysphagia/ Poor appetite   - s/p barium swallow on 10/26, cleared by  speech and swallow for po diet   - passed calorie count  - aspiration precautions     #Vascular  Dementia with behavioural disturbances   - Reversible causes of dementia ruled out - CT Head negative, b12, folate, TSH, VDRL   - MRI c spine negative   - MRI brain showed acute CVA   - c/w Zyprexa PRN for agitation   - pt completed the course of  Ampicillin for UTI    - consulted by  Psych, recommendations noted     #Urinary retention /Gross Hematuria  /BPH  - c/w IV hydration , monitor urine output   - get kidney/ bladder US noted   - patient failed TOV, mccormick replaced 11/6    #ARNULFO  - suspect pre-renal   - resolved after hydration     # Isolated elevated ALP  - follow up outpatient     DVT ppx   Dispo: TBD

## 2023-11-07 NOTE — PROGRESS NOTE ADULT - NUTRITIONAL ASSESSMENT
This patient has been assessed with a concern for Malnutrition and has been determined to have a diagnosis/diagnoses of Severe protein-calorie malnutrition.    This patient is being managed with:   Diet Pureed-  Mildly Thick Liquids (MILDTHICKLIQS)  Entered: Oct 26 2023 11:37AM  
This patient has been assessed with a concern for Malnutrition and has been determined to have a diagnosis/diagnoses of Severe protein-calorie malnutrition.    This patient is being managed with:   Diet NPO-  Entered: Oct 24 2023  2:13PM  
This patient has been assessed with a concern for Malnutrition and has been determined to have a diagnosis/diagnoses of Severe protein-calorie malnutrition.    This patient is being managed with:   Diet Pureed-  Mildly Thick Liquids (MILDTHICKLIQS)  Free Water Flush Instructions:  MAGIC CUP 3X/DAILY; PLEASE ADD 2 PACKETS OF THICKENER PER ENSURE PLUS HIGH PROTEIN  Dominic(7 Gm Arginine/7 Gm Glut/1.2 Gm HMB     Qty per Day:  2  Supplement Feeding Modality:  Oral  Ensure Plus High Protein Cans or Servings Per Day:  3       Frequency:  Daily  Entered: Oct 29 2023 11:15AM  
This patient has been assessed with a concern for Malnutrition and has been determined to have a diagnosis/diagnoses of Severe protein-calorie malnutrition.    This patient is being managed with:   Diet Pureed-  Mildly Thick Liquids (MILDTHICKLIQS)  Free Water Flush Instructions:  MAGIC CUP 3X/DAILY; PLEASE ADD 2 PACKETS OF THICKENER PER ENSURE PLUS HIGH PROTEIN  Dominic(7 Gm Arginine/7 Gm Glut/1.2 Gm HMB     Qty per Day:  2  Supplement Feeding Modality:  Oral  Ensure Plus High Protein Cans or Servings Per Day:  3       Frequency:  Daily  Entered: Oct 29 2023 11:15AM  
This patient has been assessed with a concern for Malnutrition and has been determined to have a diagnosis/diagnoses of Severe protein-calorie malnutrition.    This patient is being managed with:   Diet Pureed-  Mildly Thick Liquids (MILDTHICKLIQS)  Free Water Flush Instructions:  MAGIC CUP 3X/DAILY; PLEASE ADD 2 PACKETS OF THICKENER PER ENSURE PLUS HIGH PROTEIN  Dominic(7 Gm Arginine/7 Gm Glut/1.2 Gm HMB     Qty per Day:  2  Supplement Feeding Modality:  Oral  Ensure Plus High Protein Cans or Servings Per Day:  3       Frequency:  Daily  Entered: Oct 29 2023 11:15AM    Diet Pureed-  Mildly Thick Liquids (MILDTHICKLIQS)  Entered: Oct 26 2023 11:37AM    The following pending diet order is being considered for treatment of Severe protein-calorie malnutrition:null
This patient has been assessed with a concern for Malnutrition and has been determined to have a diagnosis/diagnoses of Severe protein-calorie malnutrition.    This patient is being managed with:   Diet NPO-  Except Medications  Entered: Oct 21 2023  5:57PM  
This patient has been assessed with a concern for Malnutrition and has been determined to have a diagnosis/diagnoses of Severe protein-calorie malnutrition.    This patient is being managed with:   Diet Pureed-  Mildly Thick Liquids (MILDTHICKLIQS)  Entered: Oct 26 2023 11:37AM  
This patient has been assessed with a concern for Malnutrition and has been determined to have a diagnosis/diagnoses of Severe protein-calorie malnutrition.    This patient is being managed with:   Diet Pureed-  Mildly Thick Liquids (MILDTHICKLIQS)  Free Water Flush Instructions:  MAGIC CUP 3X/DAILY; PLEASE ADD 2 PACKETS OF THICKENER PER ENSURE PLUS HIGH PROTEIN  Dominic(7 Gm Arginine/7 Gm Glut/1.2 Gm HMB     Qty per Day:  2  Supplement Feeding Modality:  Oral  Ensure Plus High Protein Cans or Servings Per Day:  3       Frequency:  Daily  Entered: Oct 29 2023 11:15AM  
This patient has been assessed with a concern for Malnutrition and has been determined to have a diagnosis/diagnoses of Severe protein-calorie malnutrition.    This patient is being managed with:   Diet Pureed-  Mildly Thick Liquids (MILDTHICKLIQS)  Free Water Flush Instructions:  MAGIC CUP 3X/DAILY; PLEASE ADD 2 PACKETS OF THICKENER PER ENSURE PLUS HIGH PROTEIN  Dominic(7 Gm Arginine/7 Gm Glut/1.2 Gm HMB     Qty per Day:  2  Supplement Feeding Modality:  Oral  Ensure Plus High Protein Cans or Servings Per Day:  3       Frequency:  Daily  Entered: Oct 29 2023 11:15AM  
This patient has been assessed with a concern for Malnutrition and has been determined to have a diagnosis/diagnoses of Severe protein-calorie malnutrition.    This patient is being managed with:   Diet Soft and Bite Sized-  Mildly Thick Liquids (MILDTHICKLIQS)  Entered: Oct 19 2023 11:25AM  
This patient has been assessed with a concern for Malnutrition and has been determined to have a diagnosis/diagnoses of Severe protein-calorie malnutrition.    This patient is being managed with:   Diet Soft and Bite Sized-  Mildly Thick Liquids (MILDTHICKLIQS)  Entered: Oct 19 2023 11:25AM  
This patient has been assessed with a concern for Malnutrition and has been determined to have a diagnosis/diagnoses of Severe protein-calorie malnutrition.    This patient is being managed with:   Diet Pureed-  Mildly Thick Liquids (MILDTHICKLIQS)  Free Water Flush Instructions:  MAGIC CUP 3X/DAILY; PLEASE ADD 2 PACKETS OF THICKENER PER ENSURE PLUS HIGH PROTEIN  Dominic(7 Gm Arginine/7 Gm Glut/1.2 Gm HMB     Qty per Day:  2  Supplement Feeding Modality:  Oral  Ensure Plus High Protein Cans or Servings Per Day:  3       Frequency:  Daily  Entered: Oct 29 2023 11:15AM  
This patient has been assessed with a concern for Malnutrition and has been determined to have a diagnosis/diagnoses of Severe protein-calorie malnutrition.    This patient is being managed with:   Diet Pureed-  Mildly Thick Liquids (MILDTHICKLIQS)  Free Water Flush Instructions:  MAGIC CUP 3X/DAILY; PLEASE ADD 2 PACKETS OF THICKENER PER ENSURE PLUS HIGH PROTEIN  Dominic(7 Gm Arginine/7 Gm Glut/1.2 Gm HMB     Qty per Day:  2  Supplement Feeding Modality:  Oral  Ensure Plus High Protein Cans or Servings Per Day:  3       Frequency:  Daily  Entered: Oct 29 2023 11:15AM    Diet Pureed-  Mildly Thick Liquids (MILDTHICKLIQS)  Entered: Oct 26 2023 11:37AM    The following pending diet order is being considered for treatment of Severe protein-calorie malnutrition:null
This patient has been assessed with a concern for Malnutrition and has been determined to have a diagnosis/diagnoses of Severe protein-calorie malnutrition.    This patient is being managed with:   Diet Pureed-  Mildly Thick Liquids (MILDTHICKLIQS)  Free Water Flush Instructions:  MAGIC CUP 3X/DAILY; PLEASE ADD 2 PACKETS OF THICKENER PER ENSURE PLUS HIGH PROTEIN  Dominic(7 Gm Arginine/7 Gm Glut/1.2 Gm HMB     Qty per Day:  2  Supplement Feeding Modality:  Oral  Ensure Plus High Protein Cans or Servings Per Day:  3       Frequency:  Daily  Entered: Oct 29 2023 11:15AM    Diet Pureed-  Mildly Thick Liquids (MILDTHICKLIQS)  Entered: Oct 26 2023 11:37AM    The following pending diet order is being considered for treatment of Severe protein-calorie malnutrition:null
This patient has been assessed with a concern for Malnutrition and has been determined to have a diagnosis/diagnoses of Severe protein-calorie malnutrition.    This patient is being managed with:   Diet NPO-  Entered: Oct 24 2023  2:13PM  
This patient has been assessed with a concern for Malnutrition and has been determined to have a diagnosis/diagnoses of Severe protein-calorie malnutrition.    This patient is being managed with:   Diet NPO-  Entered: Oct 24 2023  2:13PM  
This patient has been assessed with a concern for Malnutrition and has been determined to have a diagnosis/diagnoses of Severe protein-calorie malnutrition.    This patient is being managed with:   Diet NPO-  Except Medications  Entered: Oct 21 2023  5:57PM  
This patient has been assessed with a concern for Malnutrition and has been determined to have a diagnosis/diagnoses of Severe protein-calorie malnutrition.    This patient is being managed with:   Diet NPO-  Except Medications  Entered: Oct 21 2023  5:57PM  
This patient has been assessed with a concern for Malnutrition and has been determined to have a diagnosis/diagnoses of Severe protein-calorie malnutrition.    This patient is being managed with:   Diet Pureed-  Mildly Thick Liquids (MILDTHICKLIQS)  Entered: Oct 26 2023 11:37AM  
This patient has been assessed with a concern for Malnutrition and has been determined to have a diagnosis/diagnoses of Severe protein-calorie malnutrition.    This patient is being managed with:   Diet Pureed-  Mildly Thick Liquids (MILDTHICKLIQS)  Entered: Oct 26 2023 11:37AM  
This patient has been assessed with a concern for Malnutrition and has been determined to have a diagnosis/diagnoses of Severe protein-calorie malnutrition.    This patient is being managed with:   Diet Pureed-  Mildly Thick Liquids (MILDTHICKLIQS)  Free Water Flush Instructions:  MAGIC CUP 3X/DAILY; PLEASE ADD 2 PACKETS OF THICKENER PER ENSURE PLUS HIGH PROTEIN  Dominic(7 Gm Arginine/7 Gm Glut/1.2 Gm HMB     Qty per Day:  2  Supplement Feeding Modality:  Oral  Ensure Plus High Protein Cans or Servings Per Day:  3       Frequency:  Daily  Entered: Oct 29 2023 11:15AM  
This patient has been assessed with a concern for Malnutrition and has been determined to have a diagnosis/diagnoses of Severe protein-calorie malnutrition.    This patient is being managed with:   Diet Pureed-  Mildly Thick Liquids (MILDTHICKLIQS)  Free Water Flush Instructions:  MAGIC CUP 3X/DAILY; PLEASE ADD 2 PACKETS OF THICKENER PER ENSURE PLUS HIGH PROTEIN  Dominic(7 Gm Arginine/7 Gm Glut/1.2 Gm HMB     Qty per Day:  2  Supplement Feeding Modality:  Oral  Ensure Plus High Protein Cans or Servings Per Day:  3       Frequency:  Daily  Entered: Oct 29 2023 11:15AM  
This patient has been assessed with a concern for Malnutrition and has been determined to have a diagnosis/diagnoses of Severe protein-calorie malnutrition.    This patient is being managed with:   Diet Soft and Bite Sized-  Mildly Thick Liquids (MILDTHICKLIQS)  Entered: Oct 19 2023 11:25AM  
This patient has been assessed with a concern for Malnutrition and has been determined to have a diagnosis/diagnoses of Severe protein-calorie malnutrition.    This patient is being managed with:   Diet Soft and Bite Sized-  Mildly Thick Liquids (MILDTHICKLIQS)  Entered: Oct 19 2023 11:25AM

## 2023-11-07 NOTE — PROGRESS NOTE ADULT - PROVIDER SPECIALTY LIST ADULT
Electrophysiology
Hospitalist
Internal Medicine
Neurology
Hospitalist
Hospitalist
Internal Medicine
Hospitalist
Internal Medicine
Urology
Hospitalist
Internal Medicine
Neurology
Physiatry
Hospitalist
Hospitalist
Internal Medicine
Internal Medicine
Palliative Care

## 2023-11-07 NOTE — PROGRESS NOTE ADULT - SUBJECTIVE AND OBJECTIVE BOX
24H events:    Patient is a 73y old Male who presents with a chief complaint of Weakness, confusion, inability to ambulate (06 Nov 2023 10:19)    Primary diagnosis of Weakness    Today is hospital day 22d. This morning patient was seen and examined at bedside, resting comfortably in bed.  Likely dc today.  No acute or major events overnight.    Code Status: DNR/DNI    PAST MEDICAL & SURGICAL HISTORY  Hypertension, unspecified type    No significant past surgical history      SOCIAL HISTORY:  Social History:      ALLERGIES:  No Known Allergies    MEDICATIONS:  STANDING MEDICATIONS  aspirin enteric coated 81 milliGRAM(s) Oral daily  atorvastatin 40 milliGRAM(s) Oral at bedtime  chlorhexidine 2% Cloths 1 Application(s) Topical <User Schedule>  clopidogrel Tablet 75 milliGRAM(s) Oral daily  enoxaparin Injectable 40 milliGRAM(s) SubCutaneous every 24 hours  lisinopril 10 milliGRAM(s) Oral daily  pantoprazole    Tablet 40 milliGRAM(s) Oral before breakfast  tamsulosin 0.4 milliGRAM(s) Oral at bedtime    PRN MEDICATIONS  OLANZapine 2.5 milliGRAM(s) Oral every 8 hours PRN    VITALS:   T(F): 97.3  HR: 80  BP: 136/74  RR: 18  SpO2: 97%    PHYSICAL EXAM:   GENERAL: NAD, lying in bed comfortably  HEAD:  Atraumatic, Normocephalic  EYES: EOMI, sclera clear  CHEST/LUNG: Unlabored respirations  HEART: Regular rate and rhythm; No appreciable murmurs, rubs, or gallops  ABDOMEN: Soft, nontender, nondistended  EXTREMITIES: No clubbing, cyanosis, or edema  NERVOUS SYSTEM:  A&Ox3, no noted facial droop. Moving all extremities w/o difficulty.   SKIN: No rashes or lesions to b/l forearm, face.     ( - ) Indwelling Han Catheter:   Date insterted:    Reason (  ) Critical illness     (  ) urinary retention    (  ) Accurate Ins/Outs Monitoring     (  ) CMO patient    ( - ) Central Line:   Date inserted:  Location: (  ) Right IJ     (  ) Left IJ     (  ) Right Fem     (  ) Left Fem    ( - ) SPC        ( - ) pigtail       ( - ) PEG tube       ( - ) colostomy       ( - ) jejunostomy  ( - ) U-Dall    LABS:                        10.8   8.57  )-----------( 365      ( 07 Nov 2023 06:33 )             33.7     11-07    140  |  101  |  12  ----------------------------<  97  5.0   |  30  |  x     Ca    9.3      07 Nov 2023 06:33  Mg     2.2     11-07    TPro  5.9<L>  /  Alb  3.4<L>  /  TBili  0.3  /  DBili  x   /  AST  15  /  ALT  13  /  AlkPhos  104  11-07      Urinalysis Basic - ( 07 Nov 2023 06:33 )    Color: x / Appearance: x / SG: x / pH: x  Gluc: 97 mg/dL / Ketone: x  / Bili: x / Urobili: x   Blood: x / Protein: x / Nitrite: x   Leuk Esterase: x / RBC: x / WBC x   Sq Epi: x / Non Sq Epi: x / Bacteria: x                RADIOLOGY:

## 2023-11-07 NOTE — DISCHARGE NOTE NURSING/CASE MANAGEMENT/SOCIAL WORK - PATIENT PORTAL LINK FT
You can access the FollowMyHealth Patient Portal offered by [facility] by registering at the following website: [email or web address]. By joining [facility]’s FollowMyHealth portal, you will also be able to view your health information using other applications (apps) compatible with our system.

## 2024-01-18 ENCOUNTER — APPOINTMENT (OUTPATIENT)
Dept: CARDIOLOGY | Facility: CLINIC | Age: 74
End: 2024-01-18

## 2024-02-22 ENCOUNTER — APPOINTMENT (OUTPATIENT)
Dept: CARDIOLOGY | Facility: CLINIC | Age: 74
End: 2024-02-22

## 2024-03-28 ENCOUNTER — APPOINTMENT (OUTPATIENT)
Dept: CARDIOLOGY | Facility: CLINIC | Age: 74
End: 2024-03-28